# Patient Record
Sex: FEMALE | Race: WHITE | NOT HISPANIC OR LATINO | Employment: OTHER | URBAN - METROPOLITAN AREA
[De-identification: names, ages, dates, MRNs, and addresses within clinical notes are randomized per-mention and may not be internally consistent; named-entity substitution may affect disease eponyms.]

---

## 2017-04-13 ENCOUNTER — APPOINTMENT (OUTPATIENT)
Dept: LAB | Facility: CLINIC | Age: 72
End: 2017-04-13
Payer: MEDICARE

## 2017-04-13 ENCOUNTER — TRANSCRIBE ORDERS (OUTPATIENT)
Dept: URGENT CARE | Facility: CLINIC | Age: 72
End: 2017-04-13

## 2017-04-13 DIAGNOSIS — E11.9 TYPE 2 DIABETES MELLITUS WITHOUT COMPLICATIONS (HCC): ICD-10-CM

## 2017-04-13 LAB
EST. AVERAGE GLUCOSE BLD GHB EST-MCNC: 128 MG/DL
GLUCOSE P FAST SERPL-MCNC: 98 MG/DL (ref 65–99)
HBA1C MFR BLD: 6.1 % (ref 4.2–6.3)

## 2017-04-13 PROCEDURE — 82947 ASSAY GLUCOSE BLOOD QUANT: CPT

## 2017-04-13 PROCEDURE — 83036 HEMOGLOBIN GLYCOSYLATED A1C: CPT

## 2017-04-13 PROCEDURE — 84681 ASSAY OF C-PEPTIDE: CPT

## 2017-04-13 PROCEDURE — 36415 COLL VENOUS BLD VENIPUNCTURE: CPT

## 2017-04-14 LAB — C PEPTIDE SERPL-MCNC: 3.2 NG/ML (ref 1.1–4.4)

## 2017-04-20 ENCOUNTER — ALLSCRIPTS OFFICE VISIT (OUTPATIENT)
Dept: OTHER | Facility: OTHER | Age: 72
End: 2017-04-20

## 2017-06-15 ENCOUNTER — GENERIC CONVERSION - ENCOUNTER (OUTPATIENT)
Dept: OTHER | Facility: OTHER | Age: 72
End: 2017-06-15

## 2017-06-15 LAB
LEFT EYE DIABETIC RETINOPATHY: NORMAL
RIGHT EYE DIABETIC RETINOPATHY: NORMAL

## 2017-08-04 ENCOUNTER — TRANSCRIBE ORDERS (OUTPATIENT)
Dept: URGENT CARE | Facility: CLINIC | Age: 72
End: 2017-08-04

## 2017-08-04 ENCOUNTER — APPOINTMENT (OUTPATIENT)
Dept: LAB | Facility: CLINIC | Age: 72
End: 2017-08-04
Payer: MEDICARE

## 2017-08-04 DIAGNOSIS — E11.9 TYPE 2 DIABETES MELLITUS WITHOUT COMPLICATIONS (HCC): ICD-10-CM

## 2017-08-04 LAB
EST. AVERAGE GLUCOSE BLD GHB EST-MCNC: 123 MG/DL
GLUCOSE P FAST SERPL-MCNC: 101 MG/DL (ref 65–99)
HBA1C MFR BLD: 5.9 % (ref 4.2–6.3)

## 2017-08-04 PROCEDURE — 36415 COLL VENOUS BLD VENIPUNCTURE: CPT

## 2017-08-04 PROCEDURE — 84681 ASSAY OF C-PEPTIDE: CPT

## 2017-08-04 PROCEDURE — 83036 HEMOGLOBIN GLYCOSYLATED A1C: CPT

## 2017-08-04 PROCEDURE — 82947 ASSAY GLUCOSE BLOOD QUANT: CPT

## 2017-08-05 LAB — C PEPTIDE SERPL-MCNC: 4.4 NG/ML (ref 1.1–4.4)

## 2017-08-11 ENCOUNTER — ALLSCRIPTS OFFICE VISIT (OUTPATIENT)
Dept: OTHER | Facility: OTHER | Age: 72
End: 2017-08-11

## 2017-08-14 ENCOUNTER — TRANSCRIBE ORDERS (OUTPATIENT)
Dept: ADMINISTRATIVE | Facility: HOSPITAL | Age: 72
End: 2017-08-14

## 2017-08-14 DIAGNOSIS — M89.9 ABNORMAL MOVEMENT IN BONE: Primary | ICD-10-CM

## 2017-08-20 DIAGNOSIS — E11.9 TYPE 2 DIABETES MELLITUS WITHOUT COMPLICATIONS (HCC): ICD-10-CM

## 2017-09-12 ENCOUNTER — HOSPITAL ENCOUNTER (OUTPATIENT)
Dept: MAMMOGRAPHY | Facility: CLINIC | Age: 72
Discharge: HOME | End: 2017-09-12
Payer: MEDICARE

## 2017-09-12 DIAGNOSIS — M89.9 ABNORMAL MOVEMENT IN BONE: ICD-10-CM

## 2017-09-12 PROCEDURE — 77080 DXA BONE DENSITY AXIAL: CPT

## 2017-10-16 ENCOUNTER — GENERIC CONVERSION - ENCOUNTER (OUTPATIENT)
Dept: OTHER | Facility: OTHER | Age: 72
End: 2017-10-16

## 2017-11-02 ENCOUNTER — APPOINTMENT (OUTPATIENT)
Dept: LAB | Facility: CLINIC | Age: 72
End: 2017-11-02
Payer: MEDICARE

## 2017-11-02 ENCOUNTER — TRANSCRIBE ORDERS (OUTPATIENT)
Dept: URGENT CARE | Facility: CLINIC | Age: 72
End: 2017-11-02

## 2017-11-02 DIAGNOSIS — I10 ESSENTIAL (PRIMARY) HYPERTENSION: ICD-10-CM

## 2017-11-02 DIAGNOSIS — E11.9 TYPE 2 DIABETES MELLITUS WITHOUT COMPLICATIONS (HCC): ICD-10-CM

## 2017-11-02 LAB
ALBUMIN SERPL BCP-MCNC: 3.7 G/DL (ref 3.5–5)
ALP SERPL-CCNC: 61 U/L (ref 46–116)
ALT SERPL W P-5'-P-CCNC: 25 U/L (ref 12–78)
ANION GAP SERPL CALCULATED.3IONS-SCNC: 4 MMOL/L (ref 4–13)
AST SERPL W P-5'-P-CCNC: 12 U/L (ref 5–45)
BASOPHILS # BLD AUTO: 0.03 THOUSANDS/ΜL (ref 0–0.1)
BASOPHILS NFR BLD AUTO: 1 % (ref 0–1)
BILIRUB SERPL-MCNC: 0.78 MG/DL (ref 0.2–1)
BUN SERPL-MCNC: 15 MG/DL (ref 5–25)
CALCIUM SERPL-MCNC: 8.6 MG/DL (ref 8.3–10.1)
CHLORIDE SERPL-SCNC: 106 MMOL/L (ref 100–108)
CHOLEST SERPL-MCNC: 108 MG/DL (ref 50–200)
CO2 SERPL-SCNC: 29 MMOL/L (ref 21–32)
CREAT SERPL-MCNC: 0.71 MG/DL (ref 0.6–1.3)
EOSINOPHIL # BLD AUTO: 0.26 THOUSAND/ΜL (ref 0–0.61)
EOSINOPHIL NFR BLD AUTO: 5 % (ref 0–6)
ERYTHROCYTE [DISTWIDTH] IN BLOOD BY AUTOMATED COUNT: 14.1 % (ref 11.6–15.1)
EST. AVERAGE GLUCOSE BLD GHB EST-MCNC: 111 MG/DL
GFR SERPL CREATININE-BSD FRML MDRD: 85 ML/MIN/1.73SQ M
GLUCOSE P FAST SERPL-MCNC: 120 MG/DL (ref 65–99)
HBA1C MFR BLD: 5.5 % (ref 4.2–6.3)
HCT VFR BLD AUTO: 43.4 % (ref 34.8–46.1)
HDLC SERPL-MCNC: 58 MG/DL (ref 40–60)
HGB BLD-MCNC: 14.3 G/DL (ref 11.5–15.4)
LDLC SERPL CALC-MCNC: 27 MG/DL (ref 0–100)
LYMPHOCYTES # BLD AUTO: 1.07 THOUSANDS/ΜL (ref 0.6–4.47)
LYMPHOCYTES NFR BLD AUTO: 21 % (ref 14–44)
MCH RBC QN AUTO: 30.6 PG (ref 26.8–34.3)
MCHC RBC AUTO-ENTMCNC: 32.9 G/DL (ref 31.4–37.4)
MCV RBC AUTO: 93 FL (ref 82–98)
MONOCYTES # BLD AUTO: 0.68 THOUSAND/ΜL (ref 0.17–1.22)
MONOCYTES NFR BLD AUTO: 13 % (ref 4–12)
NEUTROPHILS # BLD AUTO: 3.14 THOUSANDS/ΜL (ref 1.85–7.62)
NEUTS SEG NFR BLD AUTO: 60 % (ref 43–75)
NRBC BLD AUTO-RTO: 0 /100 WBCS
PLATELET # BLD AUTO: 221 THOUSANDS/UL (ref 149–390)
PMV BLD AUTO: 10 FL (ref 8.9–12.7)
POTASSIUM SERPL-SCNC: 4.2 MMOL/L (ref 3.5–5.3)
PROT SERPL-MCNC: 7.2 G/DL (ref 6.4–8.2)
RBC # BLD AUTO: 4.67 MILLION/UL (ref 3.81–5.12)
SODIUM SERPL-SCNC: 139 MMOL/L (ref 136–145)
TRIGL SERPL-MCNC: 113 MG/DL
TSH SERPL DL<=0.05 MIU/L-ACNC: 2.24 UIU/ML (ref 0.36–3.74)
WBC # BLD AUTO: 5.18 THOUSAND/UL (ref 4.31–10.16)

## 2017-11-02 PROCEDURE — 84443 ASSAY THYROID STIM HORMONE: CPT

## 2017-11-02 PROCEDURE — 80053 COMPREHEN METABOLIC PANEL: CPT

## 2017-11-02 PROCEDURE — 85025 COMPLETE CBC W/AUTO DIFF WBC: CPT

## 2017-11-02 PROCEDURE — 36415 COLL VENOUS BLD VENIPUNCTURE: CPT

## 2017-11-02 PROCEDURE — 83036 HEMOGLOBIN GLYCOSYLATED A1C: CPT

## 2017-11-02 PROCEDURE — 80061 LIPID PANEL: CPT

## 2017-11-02 PROCEDURE — 84681 ASSAY OF C-PEPTIDE: CPT

## 2017-11-03 LAB — C PEPTIDE SERPL-MCNC: 4.9 NG/ML (ref 1.1–4.4)

## 2017-11-09 ENCOUNTER — GENERIC CONVERSION - ENCOUNTER (OUTPATIENT)
Dept: OTHER | Facility: OTHER | Age: 72
End: 2017-11-09

## 2017-11-09 ENCOUNTER — ALLSCRIPTS OFFICE VISIT (OUTPATIENT)
Dept: OTHER | Facility: OTHER | Age: 72
End: 2017-11-09

## 2017-11-09 DIAGNOSIS — E11.9 TYPE 2 DIABETES MELLITUS WITHOUT COMPLICATIONS (HCC): ICD-10-CM

## 2017-11-10 ENCOUNTER — GENERIC CONVERSION - ENCOUNTER (OUTPATIENT)
Dept: OTHER | Facility: OTHER | Age: 72
End: 2017-11-10

## 2017-11-11 DIAGNOSIS — E11.9 TYPE 2 DIABETES MELLITUS WITHOUT COMPLICATIONS (HCC): ICD-10-CM

## 2017-11-11 DIAGNOSIS — I10 ESSENTIAL (PRIMARY) HYPERTENSION: ICD-10-CM

## 2018-01-10 NOTE — PROGRESS NOTES
Assessment    1  Encounter for preventive health examination (V70 0) (Z00 00)    Discussion/Summary  Impression: Subsequent Annual Wellness Visit  Cardiovascular screening and counseling: screening not indicated  Diabetes screening and counseling: screening is current  Colorectal cancer screening and counseling: screening is current  Breast cancer screening and counseling: screening is current  Cervical cancer screening and counseling: screening is current  Osteoporosis screening and counseling: the risks and benefits of screening were discussed, due for DXA axial skeleton and due for DXA appendicular skeleton  Abdominal aortic aneurysm screening and counseling: screening not indicated  Glaucoma screening and counseling: screening is current  Immunizations: influenza vaccine is up to date this year, the lifetime pneumococcal vaccine has been completed, Td vaccination status is unknown and Tdap vaccination status is unknown  Advance Directive Planning: paperwork and instructions were given to the patient  Patient Discussion: plan discussed with the patient, follow-up visit needed in one year  The treatment plan was reviewed with the patient/guardian  The patient/guardian understands and agrees with the treatment plan      History of Present Illness  Welcome to Medicare and Wellness Visits: The patient is being seen for the subsequent annual wellness visit  Medicare Screening and Risk Factors   Hospitalizations: no previous hospitalizations  Medicare Screening Tests Risk Questions   Osteoporosis risk assessment: , female gender, over 48years of age and the patient's weight is too low (<127 lbs)  Drug and Alcohol Use: The patient has never smoked cigarettes  The patient reports rare alcohol use  She has never used illicit drugs     Diet and Physical Activity: Current diet includes well balanced meals, servings of fruit per day, servings of vegetables per day, servings of whole grains per day, servings of dairy products per day and 2 cups of tea per day  She exercises daily  Exercise: walking 30 minutes per day  Mood Disorder and Cognitive Impairment Screening: She reports feeling down, depressed, or hopeless over the past two weeks  She reports feeling little interest or pleasure in doing things over the past two weeks  Functional Ability/Level of Safety: Hearing is significantly decreased, significantly decreased in the right ear and slightly decreased in the left ear  She does not use a hearing aid  The patient is currently able to do activities of daily living without limitations  Activities of daily living details: does not need help using the phone, no transportation help needed, does not need help shopping, no meal preparation help needed, does not need help doing housework, does not need help doing laundry, does not need help managing medications and does not need help managing money  Home safety risk factors:  no unfamiliar surroundings, no loose rugs, no poor household lighting, no uneven floors, no household clutter, grab bars in the bathroom and handrails on the stairs  Advance Directives: Advance directives: living will  Co-Managers and Medical Equipment/Suppliers: See Patient Care Team   Preventive Quality Program 65 and Older: Falls Risk: The patient fell 1 times in the past 12 months  The patient is currently asymptomatic Symptoms Include: The patient currently has no urinary incontinence symptoms  Patient Care Team    Care Team Member Role Specialty Office Number   Carol Guaman, [address] [phone]   Nancy Pineda MD  Internal Medicine [phone]   Jd Irizarry MD  Ophthalmology [phone]     Active Problems     1  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)   2  Acute maxillary sinusitis (461 0) (J01 00)   3  Anxiety (300 00) (F41 9)   4  Colon cancer screening (V76 51) (Z12 11)   5  Depression (311) (F32 9)   6  Encounter for mammogram to establish baseline mammogram (V76 12) (Z12 31)   7  Encounter for preventive health examination (V70 0) (Z00 00)   8  Fatigue (780 79) (R53 83)   9  Major depressive disorder (296 20) (F32 9)   10  Mixed hyperlipidemia (272 2) (E78 2)   11  Need for influenza vaccination (V04 81) (Z23)   12  Pain, hand (729 5) (M79 643)   13  Screening for genitourinary condition (V81 6) (Z13 89)   14  Screening for neurological condition (V80 09) (Z13 89)   15  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    edema (250 50,362 04) (E11 329)    Hyperlipidemia (272 4) (E78 5)       Hypertension (401 9) (I10)       Type 2 diabetes mellitus (250 00) (E11 9)          Past Medical History    · History of Diabetes mellitus type 2, uncomplicated (444 97) (F01 4)   · History of hyperlipidemia (V12 29) (Z86 39)   · No pertinent past medical history    The active problems and past medical history were reviewed and updated today  Surgical History    · History of Cholecystectomy Laparoscopic   · History of Hysterectomy   · History of Inguinal Hernia Repair    The surgical history was reviewed and updated today  Family History  Mother    · Family history of congestive heart failure (V17 49) (Z82 49)  Father    · Family history of diabetes mellitus (V18 0) (Z83 3)    The family history was reviewed and updated today  Social History    ·    · Never a smoker   · No alcohol use   · No drug use   · Retired   · Two children  The social history was reviewed and is unchanged  Current Meds   1  BuPROPion HCl ER (XL) 150 MG Oral Tablet Extended Release 24 Hour; TAKE 1   TABLET DAILY; Therapy: 90JCM9650 to (Evaluate:41Khh8851)  Requested for: 93AAY7925; Last   Rx:32Dnk4249 Ordered   2  Irbesartan 150 MG Oral Tablet; TAKE 1 TABLET DAILY  Requested for: 20Apr2017; Last   Rx:20Apr2017 Ordered   3   MetFORMIN HCl - 500 MG Oral Tablet; TAKE 1 TABLET TWICE DAILY Requested for:   2017; Last Rx:09Klq7600 Ordered   4  Metoprolol Tartrate 25 MG Oral Tablet; TAKE 1 TABLET DAILY  Requested for:   23OZT4945; Last YR:22GDL2786; Status: ACTIVE - Transmit to Pharmacy - Awaiting   Verification Ordered   5  OneTouch Delica Lancets 20D Miscellaneous; TEST 1 TIME DAILY; Therapy: 64Eoi7870 to (Last Rx:83Wyz2794)  Requested for: 48Nup7788 Ordered   6  OneTouch Lancets Miscellaneous; TEST 1 TIME DAILY; Therapy: 32Bgs2167 to (Last Rx:56Svd3959)  Requested for: 02Usv2238 Ordered   7  OneTouch Ultra Blue In Vitro Strip; TEST ONCE DAILY; Therapy: 53Grh7205 to (Evaluate:64Dir9931)  Requested for: 2017; Last   Rx:44Sku2585 Ordered   8  Simvastatin 40 MG Oral Tablet; TAKE 1 TABLET DAILY AS DIRECTED  Requested for:   20Fba3759; Last Rx:48Eag0946 Ordered   9  TraZODone HCl - 100 MG Oral Tablet; TAKE 1 TABLET AT BEDTIME  Requested for:   43Acm7811; Last Rx:44Cei3291 Ordered    The medication list was reviewed and updated today  Allergies    1  No Known Drug Allergies    Immunizations   ** Printed in Appendix #1 below  Vitals  Signs    Temperature: 97 5 F  Heart Rate: 78  Systolic: 163  Diastolic: 72    Physical Exam    Constitutional   General appearance: No acute distress, well appearing and well nourished         Signatures   Electronically signed by : Nick Cannon DO; Aug 11 2017  9:51AM EST                       (Author)    Appendix #1     Patient: Magaly Thompson; : 1945; MRN: [id number]      1 2 3 4 5 6    Influenza  20-Oct-2010 20-Pola-2012 18-Sep-2012 25-Sep-2013 28-Oct-2014 16-Sep-2016    PPSV  23-Aug-2010

## 2018-01-11 NOTE — PROGRESS NOTES
Assessment    1  Encounter for preventive health examination (V70 0) (Z00 00)   2  Major depressive disorder (296 20) (F32 9)    Plan  Major depressive disorder    · Sertraline HCl - 25 MG Oral Tablet (Zoloft); TAKE 1 TABLET DAILY AS DIRECTED    Discussion/Summary  Impression: Initial Annual Wellness Visit, with preventive exam as well as age and risk appropriate counseling completed  Diabetes screening and counseling: screening is current, counseling was given on maintaining a healthy diet, counseling was given on maintaining a healthy weight and counseling was given on ways to improve physical activity  Colorectal cancer screening and counseling: screening is current  Breast cancer screening and counseling: screening is current  Cervical cancer screening and counseling: screening is current  Osteoporosis screening and counseling: the risks and benefits of screening were discussed, counseling was given on obtaining adequate amounts of calcium and vitamin D on a daily basis and counseling was given on the importance of regular weightbearing exercise  Abdominal aortic aneurysm screening and counseling: screening not indicated  Glaucoma screening and counseling: screening is current  Immunizations: the risks and benefits of influenza vaccination were discussed with the patient and the lifetime pneumococcal vaccine has been completed  Advance Directive Planning: paperwork and instructions were given to the patient  Patient Discussion: plan discussed with the patient, follow-up visit needed in one year  History of Present Illness  Welcome to Medicare and Wellness Visits: The patient is being seen for the initial annual wellness visit  Medicare Screening and Risk Factors   Hospitalizations: no previous hospitalizations  Medicare Screening Tests Risk Questions   Osteoporosis risk assessment: , female gender and over 48years of age  HIV risk assessment: none indicated     Drug and Alcohol Use: The patient has never smoked cigarettes  The patient reports rare alcohol use and WINE ONCE OR TWICE A YEAR  Alcohol concern:   The patient has no concerns about alcohol abuse  She has never used illicit drugs  Diet and Physical Activity: Current diet includes limited junk food, 2 servings of fruit per day, 2 servings of vegetables per day, 2 servings of meat per day, 1 servings of whole grains per day, 3 servings of dairy products per day, 0 cups of coffee per day and 2 cups of tea per day  She exercises infrequently  Exercise: walking, stretching  Mood Disorder and Cognitive Impairment Screening: PHQ-9 Depression Scale   Over the past 2 weeks, how often have you been bothered by the following problems? 1 ) Little interest or pleasure in doing things? Several days  2 ) Feeling down, depressed or hopeless? Several days  3 ) Trouble falling asleep or sleeping too much? Half the days or more  4 ) Feeling tired or having little energy? Half the days or more  5 ) Poor appetite or overeating? Several days  6 ) Feeling bad about yourself, or that you are a failure, or have let yourself or your family down? Several days  7 ) Trouble concentrating on things, such as reading a newspaper or watching television? Not at all    8 ) Moving or speaking so slowly that other people could have noticed, or the opposite, moving or speaking faster than usual? Several days  How difficult have these problems made it for you to do your work, take care of things at home, or get along with people? Somewhat difficult  She reports feeling down, depressed, or hopeless over the past two weeks  She denies feeling little interest or pleasure in doing things over the past two weeks     Cognitive impairment screening: denies difficulty learning/retaining new information, denies difficulty handling complex tasks, denies difficulty with reasoning, denies difficulty with spatial ability and orientation, denies difficulty with language and denies difficulty with behavior  Functional Ability/Level of Safety: Hearing is normal bilaterally, normal in the right ear and a hearing aid is not used  The patient is currently able to do activities of daily living without limitations, able to do instrumental activities of daily living without limitations and able to drive without limitations  Activities of daily living details: does not need help using the phone, no transportation help needed, does not need help shopping, no meal preparation help needed, does not need help doing housework, does not need help doing laundry, does not need help managing medications and does not need help managing money  Home safety risk factors:  no unfamiliar surroundings, no loose rugs, no poor household lighting, no uneven floors, no household clutter, grab bars in the bathroom and handrails on the stairs  Advance Directives: Advance directives: no living will, no durable power of  for health care directives and no advance directives  Co-Managers and Medical Equipment/Suppliers: See Patient Care Team      Patient Care Team    Care Team Member Role Specialty Office Number   Ernie, [address] [phone]   Norene Buerger MD  Internal Medicine [phone]   Candance Slot MD  Ophthalmology [phone]     Review of Systems    Constitutional: no fever and no chills  Active Problems    1  Anxiety (300 00) (F41 9)   2  Colon cancer screening (V76 51) (Z12 11)   3  Depression (311) (F32 9)   4  Fatigue (780 79) (R53 83)   5  Hyperlipidemia (272 4) (E78 5)   6  Hypertension (401 9) (I10)   7  Major depressive disorder (296 20) (F32 9)   8  Mixed hyperlipidemia (272 2) (E78 2)   9  Pain, hand (729 5) (M79 643)   10   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    edema (250 50,362 04) (E11 329)    Past Medical History    · History of Diabetes mellitus type 2, uncomplicated (250 00) (E11 9)   · History of hyperlipidemia (V12 29) (Z86 39)   · No pertinent past medical history    The active problems and past medical history were reviewed and updated today  Surgical History    · History of Cholecystectomy Laparoscopic   · History of Hysterectomy   · History of Inguinal Hernia Repair    The surgical history was reviewed and updated today  Family History  Mother    · Family history of congestive heart failure (V17 49) (Z82 49)  Father    · Family history of diabetes mellitus (V18 0) (Z83 3)    The family history was reviewed and updated today  Social History    ·    · Never a smoker   · No alcohol use   · No drug use   · Retired   · Two children  The social history was reviewed and updated today  The social history was reviewed and is unchanged  Current Meds   1  Avapro 150 MG Oral Tablet; TAKE 1 TABLET DAILY; Therapy: (Recorded:15Apr2016) to Recorded   2  Effexor  MG Oral Capsule Extended Release 24 Hour; TAKE 1 CAPSULE DAILY; Therapy: (Recorded:15Apr2016) to Recorded   3  MetFORMIN HCl - 500 MG Oral Tablet; Take 1 tablet twice daily  Requested for:   09FZH9285; Last Rx:64Mwg3613 Ordered   4  Metoprolol Tartrate 25 MG Oral Tablet; TAKE 1 TABLET DAILY; Therapy: (Recorded:15Apr2016) to Recorded   5  Simvastatin 20 MG Oral Tablet; TAKE 1 TABLET AT BEDTIME; Therapy: (Recorded:06May2016) to Recorded   6  TraZODone HCl - 100 MG Oral Tablet; TAKE 1 TABLET AT BEDTIME; Therapy: (Recorded:15Apr2016) to Recorded   7  Zocor 40 MG Oral Tablet; TAKE 1 TABLET AT BEDTIME; Therapy: (Recorded:15Apr2016) to Recorded    The medication list was reviewed and updated today  Allergies    1  No Known Drug Allergies    Immunizations   ** Printed in Appendix #1 below  Physical Exam    Constitutional   General appearance: No acute distress, well appearing and well nourished         Signatures   Electronically signed by : Syed Rowland DO; Aug 16 2016  9:40AM EST                       (Author)    Appendix #1     PatientMillard Dave; : 1945; MRN: [id number]      1 2 3 4 5    Influenza  20-Oct-2010 20-Pola-2012 18-Sep-2012 25-Sep-2013 28-Oct-2014    PPSV  23-Aug-2010

## 2018-01-13 VITALS
HEIGHT: 59 IN | DIASTOLIC BLOOD PRESSURE: 60 MMHG | WEIGHT: 118.6 LBS | BODY MASS INDEX: 23.91 KG/M2 | SYSTOLIC BLOOD PRESSURE: 102 MMHG

## 2018-01-13 VITALS — DIASTOLIC BLOOD PRESSURE: 72 MMHG | TEMPERATURE: 97.5 F | HEART RATE: 78 BPM | SYSTOLIC BLOOD PRESSURE: 128 MMHG

## 2018-01-15 VITALS — WEIGHT: 119 LBS | BODY MASS INDEX: 23.99 KG/M2 | HEIGHT: 59 IN

## 2018-01-22 VITALS
BODY MASS INDEX: 23.99 KG/M2 | WEIGHT: 119 LBS | DIASTOLIC BLOOD PRESSURE: 58 MMHG | TEMPERATURE: 96.2 F | HEIGHT: 59 IN | SYSTOLIC BLOOD PRESSURE: 100 MMHG

## 2018-01-26 ENCOUNTER — OFFICE VISIT (OUTPATIENT)
Dept: FAMILY MEDICINE CLINIC | Facility: CLINIC | Age: 73
End: 2018-01-26
Payer: MEDICARE

## 2018-01-26 VITALS
TEMPERATURE: 98 F | DIASTOLIC BLOOD PRESSURE: 72 MMHG | WEIGHT: 116 LBS | HEART RATE: 65 BPM | HEIGHT: 59 IN | BODY MASS INDEX: 23.39 KG/M2 | SYSTOLIC BLOOD PRESSURE: 125 MMHG | OXYGEN SATURATION: 98 %

## 2018-01-26 DIAGNOSIS — J01.00 ACUTE NON-RECURRENT MAXILLARY SINUSITIS: Primary | ICD-10-CM

## 2018-01-26 PROCEDURE — 99213 OFFICE O/P EST LOW 20 MIN: CPT | Performed by: FAMILY MEDICINE

## 2018-01-26 RX ORDER — ALENDRONATE SODIUM 70 MG/1
1 TABLET ORAL WEEKLY
COMMUNITY
Start: 2017-09-15 | End: 2018-05-01 | Stop reason: SDUPTHER

## 2018-01-26 RX ORDER — AZITHROMYCIN 250 MG/1
TABLET, FILM COATED ORAL
Qty: 6 TABLET | Refills: 0 | Status: SHIPPED | OUTPATIENT
Start: 2018-01-26 | End: 2018-01-30

## 2018-01-26 RX ORDER — IRBESARTAN 150 MG/1
1 TABLET ORAL DAILY
COMMUNITY
End: 2018-01-29 | Stop reason: SDUPTHER

## 2018-01-26 RX ORDER — SIMVASTATIN 40 MG
1 TABLET ORAL DAILY
COMMUNITY
End: 2018-02-01 | Stop reason: SDUPTHER

## 2018-01-26 RX ORDER — TRAZODONE HYDROCHLORIDE 100 MG/1
1 TABLET ORAL
COMMUNITY
End: 2018-02-12 | Stop reason: SDUPTHER

## 2018-01-26 RX ORDER — BUPROPION HYDROCHLORIDE 150 MG/1
1 TABLET ORAL DAILY
COMMUNITY
Start: 2016-09-16 | End: 2018-08-13 | Stop reason: SDUPTHER

## 2018-01-26 NOTE — PROGRESS NOTES
Assessment/Plan:    No problem-specific Assessment & Plan notes found for this encounter  There are no diagnoses linked to this encounter  Subjective:      Patient ID: Rain Su is a 67 y o  female  Pt complains of coughing sinus pain and pressure, chills, it started 1 month ago, pt has not had ill contacts, pt tried OTC medication but it has not helped, pt has some diarrhea, and lack of appetite, but no nausea, or vomiting        The following portions of the patient's history were reviewed and updated as appropriate: allergies, current medications, past family history, past medical history, past social history, past surgical history and problem list     Review of Systems   Constitutional: Positive for chills  Negative for fever  Respiratory: Negative for shortness of breath and wheezing  Objective:     Physical Exam   Constitutional: She is oriented to person, place, and time  She appears well-developed and well-nourished  No distress  HENT:   Head: Normocephalic and atraumatic  Right Ear: Tympanic membrane and ear canal normal    Left Ear: Tympanic membrane and ear canal normal    Nose: Mucosal edema and rhinorrhea present  No nasal deformity  Mouth/Throat: Mucous membranes are not pale, not dry and not cyanotic  No oropharyngeal exudate, posterior oropharyngeal edema, posterior oropharyngeal erythema or tonsillar abscesses  Eyes: No scleral icterus  Neck: Normal range of motion  Neck supple  Cardiovascular: Normal rate, regular rhythm and normal heart sounds  No murmur heard  Pulmonary/Chest: Effort normal and breath sounds normal  No stridor  No respiratory distress  She has no wheezes  She has no rales  She exhibits no tenderness  Lymphadenopathy:     She has no cervical adenopathy  Neurological: She is alert and oriented to person, place, and time  Skin: Skin is warm and dry  She is not diaphoretic  Psychiatric: She has a normal mood and affect   Her behavior is normal  Judgment and thought content normal    Nursing note and vitals reviewed

## 2018-01-29 DIAGNOSIS — I10 ESSENTIAL HYPERTENSION: Primary | ICD-10-CM

## 2018-01-29 RX ORDER — IRBESARTAN 150 MG/1
TABLET ORAL
Qty: 90 TABLET | Refills: 3 | Status: SHIPPED | OUTPATIENT
Start: 2018-01-29 | End: 2018-05-14 | Stop reason: SDUPTHER

## 2018-02-01 DIAGNOSIS — E78.00 HYPERCHOLESTEROLEMIA: Primary | ICD-10-CM

## 2018-02-01 RX ORDER — SIMVASTATIN 40 MG
40 TABLET ORAL DAILY
Qty: 30 TABLET | Refills: 5 | Status: SHIPPED | OUTPATIENT
Start: 2018-02-01 | End: 2018-03-22 | Stop reason: SDUPTHER

## 2018-02-05 DIAGNOSIS — I10 ESSENTIAL HYPERTENSION: ICD-10-CM

## 2018-02-05 DIAGNOSIS — I10 HYPERTENSION, UNSPECIFIED TYPE: Primary | ICD-10-CM

## 2018-02-12 DIAGNOSIS — R52 PAIN: ICD-10-CM

## 2018-02-12 DIAGNOSIS — M79.643 PAIN OF HAND, UNSPECIFIED LATERALITY: Primary | ICD-10-CM

## 2018-02-13 RX ORDER — TRAZODONE HYDROCHLORIDE 100 MG/1
100 TABLET ORAL
Qty: 90 TABLET | Refills: 0 | Status: SHIPPED | OUTPATIENT
Start: 2018-02-13 | End: 2018-05-07 | Stop reason: SDUPTHER

## 2018-02-22 ENCOUNTER — TRANSCRIBE ORDERS (OUTPATIENT)
Dept: URGENT CARE | Facility: CLINIC | Age: 73
End: 2018-02-22

## 2018-02-22 ENCOUNTER — APPOINTMENT (OUTPATIENT)
Dept: LAB | Facility: CLINIC | Age: 73
End: 2018-02-22
Payer: MEDICARE

## 2018-02-22 DIAGNOSIS — E11.9 TYPE 2 DIABETES MELLITUS WITHOUT COMPLICATIONS (HCC): ICD-10-CM

## 2018-02-22 LAB
EST. AVERAGE GLUCOSE BLD GHB EST-MCNC: 111 MG/DL
GLUCOSE P FAST SERPL-MCNC: 105 MG/DL (ref 65–99)
HBA1C MFR BLD: 5.5 % (ref 4.2–6.3)

## 2018-02-22 PROCEDURE — 83036 HEMOGLOBIN GLYCOSYLATED A1C: CPT

## 2018-02-22 PROCEDURE — 84681 ASSAY OF C-PEPTIDE: CPT

## 2018-02-22 PROCEDURE — 82947 ASSAY GLUCOSE BLOOD QUANT: CPT

## 2018-02-22 PROCEDURE — 36415 COLL VENOUS BLD VENIPUNCTURE: CPT

## 2018-02-23 LAB — C PEPTIDE SERPL-MCNC: 4.3 NG/ML (ref 1.1–4.4)

## 2018-03-01 ENCOUNTER — OFFICE VISIT (OUTPATIENT)
Dept: FAMILY MEDICINE CLINIC | Facility: CLINIC | Age: 73
End: 2018-03-01
Payer: MEDICARE

## 2018-03-01 VITALS
BODY MASS INDEX: 23.39 KG/M2 | TEMPERATURE: 99.8 F | HEIGHT: 59 IN | WEIGHT: 116 LBS | HEART RATE: 88 BPM | DIASTOLIC BLOOD PRESSURE: 62 MMHG | SYSTOLIC BLOOD PRESSURE: 100 MMHG | OXYGEN SATURATION: 99 %

## 2018-03-01 DIAGNOSIS — E11.9 TYPE 2 DIABETES MELLITUS WITHOUT COMPLICATION, WITHOUT LONG-TERM CURRENT USE OF INSULIN (HCC): Primary | ICD-10-CM

## 2018-03-01 DIAGNOSIS — J01.00 ACUTE NON-RECURRENT MAXILLARY SINUSITIS: ICD-10-CM

## 2018-03-01 DIAGNOSIS — I10 ESSENTIAL HYPERTENSION: ICD-10-CM

## 2018-03-01 PROCEDURE — 99214 OFFICE O/P EST MOD 30 MIN: CPT | Performed by: FAMILY MEDICINE

## 2018-03-01 RX ORDER — AMOXICILLIN AND CLAVULANATE POTASSIUM 875; 125 MG/1; MG/1
1 TABLET, FILM COATED ORAL EVERY 12 HOURS SCHEDULED
Qty: 20 TABLET | Refills: 0 | Status: SHIPPED | OUTPATIENT
Start: 2018-03-01 | End: 2018-03-11

## 2018-03-01 NOTE — PROGRESS NOTES
Assessment/Plan:    No problem-specific Assessment & Plan notes found for this encounter  Diagnoses and all orders for this visit:    Type 2 diabetes mellitus without complication, without long-term current use of insulin ([facility])  Comments:  well controlled no change in the medication  Orders:  -     C-peptide; Future  -     Glucose, fasting; Future  -     HEMOGLOBIN A1C W/ EAG ESTIMATION; Future    Essential hypertension  Comments:  well controlled no change in the medications    Acute non-recurrent maxillary sinusitis  Comments:  pt counseled to eat yogurt with abx  Orders:  -     amoxicillin-clavulanate (AUGMENTIN) 875-125 mg per tablet; Take 1 tablet by mouth every 12 (twelve) hours for 10 days          Subjective:      Patient ID: Emily Galarza is a 67 y o  female  Follow up for HTN, which is well controlled, pt complains today of sinus pain and pressure, stuffy nose, runny nose, coughing non-productive, it started 3 days ago, pt has not tried anything for it, pt has had some ill contacts      Diabetes   She presents for her follow-up diabetic visit  She has type 2 diabetes mellitus  There are no hypoglycemic associated symptoms  There are no diabetic associated symptoms  Pertinent negatives for diabetes include no chest pain  There are no hypoglycemic complications  There are no diabetic complications  Risk factors for coronary artery disease include obesity, sedentary lifestyle and post-menopausal  She is compliant with treatment most of the time  Her weight is stable  She is following a diabetic diet  Her overall blood glucose range is  mg/dl  An ACE inhibitor/angiotensin II receptor blocker is being taken  Eye exam is current         The following portions of the patient's history were reviewed and updated as appropriate: allergies, current medications, past family history, past medical history, past social history, past surgical history and problem list     Review of Systems Constitutional: Positive for chills  Negative for fever  Eyes: Negative for visual disturbance  Cardiovascular: Negative for chest pain and palpitations  Gastrointestinal: Negative for abdominal pain, nausea and vomiting  Genitourinary: Negative for frequency  Skin: Negative for wound  Neurological: Negative for numbness  Objective:      /62 (BP Location: Right arm, Patient Position: Sitting, Cuff Size: Adult)   Pulse 88   Temp 99 8 °F (37 7 °C) (Tympanic)   Ht 4' 10 5" (1 486 m)   Wt 52 6 kg (116 lb)   SpO2 99%   BMI 23 83 kg/m²          Physical Exam   Constitutional: She is oriented to person, place, and time  She appears well-developed and well-nourished  No distress  HENT:   Head: Normocephalic and atraumatic  Right Ear: Tympanic membrane, external ear and ear canal normal    Left Ear: Tympanic membrane, external ear and ear canal normal    Nose: Mucosal edema and rhinorrhea present  [place] OP   Eyes: Conjunctivae and EOM are normal  Pupils are equal, round, and reactive to light  No scleral icterus  Neck: Normal range of motion  Neck supple  Cardiovascular: Normal rate, regular rhythm and normal heart sounds  No murmur heard  Pulmonary/Chest: Effort normal and breath sounds normal  No respiratory distress  She has no wheezes  She has no rales  Musculoskeletal: Normal range of motion  She exhibits no edema or deformity  Lymphadenopathy:     She has no cervical adenopathy  Neurological: She is alert and oriented to person, place, and time  Skin: Skin is warm and dry  No rash noted  She is not diaphoretic  No erythema  No pallor  Psychiatric: She has a normal mood and affect  Her behavior is normal  Judgment and thought content normal    Nursing note and vitals reviewed

## 2018-03-22 DIAGNOSIS — E78.00 HYPERCHOLESTEROLEMIA: ICD-10-CM

## 2018-03-22 RX ORDER — SIMVASTATIN 40 MG
40 TABLET ORAL DAILY
Qty: 90 TABLET | Refills: 1 | Status: SHIPPED | OUTPATIENT
Start: 2018-03-22 | End: 2018-09-29 | Stop reason: SDUPTHER

## 2018-05-01 DIAGNOSIS — M81.0 OSTEOPOROSIS, UNSPECIFIED OSTEOPOROSIS TYPE, UNSPECIFIED PATHOLOGICAL FRACTURE PRESENCE: Primary | ICD-10-CM

## 2018-05-01 RX ORDER — ALENDRONATE SODIUM 70 MG/1
70 TABLET ORAL
Qty: 4 TABLET | Refills: 5 | Status: SHIPPED | OUTPATIENT
Start: 2018-05-01 | End: 2018-11-28 | Stop reason: SDUPTHER

## 2018-05-07 DIAGNOSIS — E11.9 TYPE 2 DIABETES MELLITUS WITHOUT COMPLICATION, WITHOUT LONG-TERM CURRENT USE OF INSULIN (HCC): Primary | ICD-10-CM

## 2018-05-07 DIAGNOSIS — M79.643 PAIN OF HAND, UNSPECIFIED LATERALITY: ICD-10-CM

## 2018-05-07 RX ORDER — TRAZODONE HYDROCHLORIDE 100 MG/1
100 TABLET ORAL
Qty: 90 TABLET | Refills: 1 | Status: SHIPPED | OUTPATIENT
Start: 2018-05-07 | End: 2018-11-03 | Stop reason: SDUPTHER

## 2018-05-10 ENCOUNTER — CONSULT (OUTPATIENT)
Dept: FAMILY MEDICINE CLINIC | Facility: CLINIC | Age: 73
End: 2018-05-10
Payer: MEDICARE

## 2018-05-10 VITALS
RESPIRATION RATE: 96 BRPM | WEIGHT: 116 LBS | BODY MASS INDEX: 24.35 KG/M2 | SYSTOLIC BLOOD PRESSURE: 138 MMHG | TEMPERATURE: 98.1 F | DIASTOLIC BLOOD PRESSURE: 70 MMHG | HEIGHT: 58 IN | HEART RATE: 89 BPM

## 2018-05-10 DIAGNOSIS — I10 ESSENTIAL HYPERTENSION: Primary | ICD-10-CM

## 2018-05-10 DIAGNOSIS — H25.9 AGE-RELATED CATARACT OF BOTH EYES, UNSPECIFIED AGE-RELATED CATARACT TYPE: ICD-10-CM

## 2018-05-10 PROCEDURE — 99213 OFFICE O/P EST LOW 20 MIN: CPT | Performed by: FAMILY MEDICINE

## 2018-05-10 RX ORDER — PREDNISOLONE ACETATE 10 MG/ML
SUSPENSION/ DROPS OPHTHALMIC
COMMUNITY
Start: 2018-04-30 | End: 2020-08-26 | Stop reason: ALTCHOICE

## 2018-05-10 RX ORDER — OFLOXACIN 3 MG/ML
SOLUTION/ DROPS OPHTHALMIC
COMMUNITY
Start: 2018-05-06 | End: 2020-08-26 | Stop reason: ALTCHOICE

## 2018-05-10 NOTE — PROGRESS NOTES
Assessment/Plan:    No problem-specific Assessment & Plan notes found for this encounter  Diagnoses and all orders for this visit:    Essential hypertension  Comments:  well controlled on current medication    Age-related cataract of both eyes, unspecified age-related cataract type  Comments:  pt is a low cardiovascular risk for proposed proceedure by ACC/AHA guidlines    Other orders  -     prednisoLONE acetate (PRED FORTE) 1 % ophthalmic suspension;   -     ofloxacin (OCUFLOX) 0 3 % ophthalmic solution;           Subjective:      Patient ID: Rachele Tellez is a 68 y o  female  Pt here about bilateral cataracts pt is having surgery starting with the left      Hypertension   This is a chronic problem  The current episode started more than 1 year ago  The problem is controlled  Pertinent negatives include no chest pain, palpitations or shortness of breath  Past treatments include angiotensin blockers  There are no compliance problems  The following portions of the patient's history were reviewed and updated as appropriate: allergies, current medications, past family history, past medical history, past social history, past surgical history and problem list     Review of Systems   Constitutional: Negative for chills, fatigue and fever  HENT: Negative  Eyes: Negative  Respiratory: Negative for shortness of breath and wheezing  Cardiovascular: Negative for chest pain and palpitations  Gastrointestinal: Negative for abdominal pain, blood in stool, constipation, diarrhea, nausea and vomiting  Endocrine: Negative  Genitourinary: Negative for dysuria  Musculoskeletal: Negative for arthralgias and myalgias  Skin: Negative  Allergic/Immunologic: Negative  Neurological: Negative for seizures and syncope  Hematological: Negative for adenopathy  Psychiatric/Behavioral: Negative            Objective:      /70   Pulse 89   Temp 98 1 °F (36 7 °C)   Resp (!) 96   Ht 4' 10 25" (1 48 m)   Wt 52 6 kg (116 lb)   BMI 24 04 kg/m²          Physical Exam   Constitutional: She is oriented to person, place, and time  She appears well-developed and well-nourished  No distress  HENT:   Head: Normocephalic and atraumatic  Right Ear: External ear normal    Left Ear: External ear normal    Nose: Nose normal    Mouth/Throat: Oropharynx is clear and moist    Eyes: Conjunctivae and EOM are normal  Pupils are equal, round, and reactive to light  No scleral icterus  Neck: Normal range of motion  Neck supple  Cardiovascular: Normal rate, regular rhythm and normal heart sounds  No murmur heard  Pulmonary/Chest: Effort normal and breath sounds normal  No respiratory distress  She has no wheezes  She has no rales  Abdominal: Soft  Bowel sounds are normal  She exhibits no distension and no mass  There is no tenderness  There is no rebound and no guarding  Musculoskeletal: Normal range of motion  She exhibits no edema  Lymphadenopathy:     She has no cervical adenopathy  Neurological: She is alert and oriented to person, place, and time  She has normal reflexes  She exhibits normal muscle tone  Skin: Skin is warm and dry  No rash noted  She is not diaphoretic  No erythema  No pallor  Psychiatric: She has a normal mood and affect  Her behavior is normal  Judgment and thought content normal    Nursing note and vitals reviewed

## 2018-05-14 DIAGNOSIS — I10 ESSENTIAL HYPERTENSION: ICD-10-CM

## 2018-05-15 RX ORDER — IRBESARTAN 150 MG/1
150 TABLET ORAL DAILY
Qty: 90 TABLET | Refills: 3 | Status: SHIPPED | OUTPATIENT
Start: 2018-05-15 | End: 2019-03-11

## 2018-05-25 ENCOUNTER — APPOINTMENT (OUTPATIENT)
Dept: LAB | Facility: CLINIC | Age: 73
End: 2018-05-25
Payer: MEDICARE

## 2018-05-25 DIAGNOSIS — E11.9 TYPE 2 DIABETES MELLITUS WITHOUT COMPLICATION, WITHOUT LONG-TERM CURRENT USE OF INSULIN (HCC): ICD-10-CM

## 2018-05-25 LAB
EST. AVERAGE GLUCOSE BLD GHB EST-MCNC: 111 MG/DL
GLUCOSE P FAST SERPL-MCNC: 108 MG/DL (ref 65–99)
HBA1C MFR BLD: 5.5 % (ref 4.2–6.3)

## 2018-05-25 PROCEDURE — 36415 COLL VENOUS BLD VENIPUNCTURE: CPT

## 2018-05-25 PROCEDURE — 83036 HEMOGLOBIN GLYCOSYLATED A1C: CPT

## 2018-05-25 PROCEDURE — 82947 ASSAY GLUCOSE BLOOD QUANT: CPT

## 2018-05-25 PROCEDURE — 84681 ASSAY OF C-PEPTIDE: CPT

## 2018-05-26 LAB — C PEPTIDE SERPL-MCNC: 4.5 NG/ML (ref 1.1–4.4)

## 2018-06-01 ENCOUNTER — OFFICE VISIT (OUTPATIENT)
Dept: FAMILY MEDICINE CLINIC | Facility: CLINIC | Age: 73
End: 2018-06-01
Payer: MEDICARE

## 2018-06-01 VITALS
OXYGEN SATURATION: 100 % | HEIGHT: 59 IN | BODY MASS INDEX: 22.9 KG/M2 | WEIGHT: 113.6 LBS | TEMPERATURE: 98.3 F | SYSTOLIC BLOOD PRESSURE: 111 MMHG | DIASTOLIC BLOOD PRESSURE: 66 MMHG | HEART RATE: 66 BPM

## 2018-06-01 DIAGNOSIS — E11.9 TYPE 2 DIABETES MELLITUS WITHOUT COMPLICATION, WITHOUT LONG-TERM CURRENT USE OF INSULIN (HCC): Primary | ICD-10-CM

## 2018-06-01 DIAGNOSIS — I10 ESSENTIAL HYPERTENSION: ICD-10-CM

## 2018-06-01 DIAGNOSIS — E11.9 ENCOUNTER FOR DIABETIC FOOT EXAM (HCC): ICD-10-CM

## 2018-06-01 DIAGNOSIS — E78.00 HYPERCHOLESTEROLEMIA: ICD-10-CM

## 2018-06-01 PROCEDURE — 99214 OFFICE O/P EST MOD 30 MIN: CPT | Performed by: FAMILY MEDICINE

## 2018-08-13 DIAGNOSIS — F32.A DEPRESSION, UNSPECIFIED DEPRESSION TYPE: Primary | ICD-10-CM

## 2018-08-13 RX ORDER — BUPROPION HYDROCHLORIDE 150 MG/1
150 TABLET ORAL DAILY
Qty: 30 TABLET | Refills: 5 | Status: SHIPPED | OUTPATIENT
Start: 2018-08-13 | End: 2019-02-16 | Stop reason: SDUPTHER

## 2018-09-04 ENCOUNTER — TRANSCRIBE ORDERS (OUTPATIENT)
Dept: LAB | Facility: CLINIC | Age: 73
End: 2018-09-04

## 2018-09-04 ENCOUNTER — APPOINTMENT (OUTPATIENT)
Dept: LAB | Facility: CLINIC | Age: 73
End: 2018-09-04
Payer: MEDICARE

## 2018-09-04 DIAGNOSIS — E11.9 TYPE 2 DIABETES MELLITUS WITHOUT COMPLICATION, WITHOUT LONG-TERM CURRENT USE OF INSULIN (HCC): ICD-10-CM

## 2018-09-04 LAB
EST. AVERAGE GLUCOSE BLD GHB EST-MCNC: 117 MG/DL
GLUCOSE P FAST SERPL-MCNC: 102 MG/DL (ref 65–99)
HBA1C MFR BLD: 5.7 % (ref 4.2–6.3)

## 2018-09-04 PROCEDURE — 83036 HEMOGLOBIN GLYCOSYLATED A1C: CPT

## 2018-09-04 PROCEDURE — 82947 ASSAY GLUCOSE BLOOD QUANT: CPT

## 2018-09-04 PROCEDURE — 36415 COLL VENOUS BLD VENIPUNCTURE: CPT

## 2018-09-04 PROCEDURE — 84681 ASSAY OF C-PEPTIDE: CPT

## 2018-09-05 LAB — C PEPTIDE SERPL-MCNC: 5 NG/ML (ref 1.1–4.4)

## 2018-09-07 ENCOUNTER — OFFICE VISIT (OUTPATIENT)
Dept: FAMILY MEDICINE CLINIC | Facility: CLINIC | Age: 73
End: 2018-09-07
Payer: MEDICARE

## 2018-09-07 VITALS
WEIGHT: 121 LBS | BODY MASS INDEX: 24.39 KG/M2 | SYSTOLIC BLOOD PRESSURE: 106 MMHG | TEMPERATURE: 97.7 F | DIASTOLIC BLOOD PRESSURE: 62 MMHG | HEIGHT: 59 IN

## 2018-09-07 DIAGNOSIS — I10 ESSENTIAL HYPERTENSION: ICD-10-CM

## 2018-09-07 DIAGNOSIS — E11.9 TYPE 2 DIABETES MELLITUS WITHOUT COMPLICATION, WITHOUT LONG-TERM CURRENT USE OF INSULIN (HCC): Primary | ICD-10-CM

## 2018-09-07 DIAGNOSIS — Z23 NEED FOR INFLUENZA VACCINATION: Primary | ICD-10-CM

## 2018-09-07 DIAGNOSIS — F32.A DEPRESSION, UNSPECIFIED DEPRESSION TYPE: ICD-10-CM

## 2018-09-07 PROCEDURE — 90662 IIV NO PRSV INCREASED AG IM: CPT

## 2018-09-07 PROCEDURE — G0008 ADMIN INFLUENZA VIRUS VAC: HCPCS

## 2018-09-07 PROCEDURE — 99214 OFFICE O/P EST MOD 30 MIN: CPT | Performed by: FAMILY MEDICINE

## 2018-09-07 NOTE — PROGRESS NOTES
Assessment/Plan:    No problem-specific Assessment & Plan notes found for this encounter  Diagnoses and all orders for this visit:    Type 2 diabetes mellitus without complication, without long-term current use of insulin (HCC)  -     C-peptide; Future  -     Glucose, fasting; Future  -     Hemoglobin A1C; Future    Essential hypertension  -     CBC and differential; Future  -     Comprehensive metabolic panel; Future  -     Lipid panel; Future    Depression, unspecified depression type  Comments:  pt is doing well  Orders:  -     TSH, 3rd generation with Free T4 reflex; Future          Subjective:      Patient ID: Darius Barraza is a 68 y o  female  Follow up for HTN, pt is taking avapro and metoprolol and is doing well, pt is taking metformin only once daily, follow up for depression pt is doing well on wellbutrin      Diabetes   She presents for her follow-up diabetic visit  She has type 2 diabetes mellitus  Her disease course has been stable  There are no hypoglycemic associated symptoms  There are no diabetic associated symptoms  Pertinent negatives for diabetes include no chest pain  There are no hypoglycemic complications  Symptoms are stable  Risk factors for coronary artery disease include post-menopausal and sedentary lifestyle  Current diabetic treatment includes oral agent (monotherapy)  She is compliant with treatment most of the time  Her weight is stable  Her overall blood glucose range is 110-130 mg/dl  An ACE inhibitor/angiotensin II receptor blocker is being taken  The following portions of the patient's history were reviewed and updated as appropriate: allergies, current medications, past family history, past medical history, past social history, past surgical history and problem list     Review of Systems   Eyes: Negative for visual disturbance  Cardiovascular: Negative for chest pain and palpitations  Gastrointestinal: Negative for abdominal pain, nausea and vomiting  Genitourinary: Negative for frequency  Skin: Negative for wound  Neurological: Negative for numbness  Objective:      /62 (BP Location: Left arm, Patient Position: Sitting, Cuff Size: Adult)   Temp 97 7 °F (36 5 °C) (Tympanic)   Ht 4' 10 5" (1 486 m)   Wt 54 9 kg (121 lb)   BMI 24 86 kg/m²          Physical Exam   Constitutional: She is oriented to person, place, and time  She appears well-developed and well-nourished  No distress  HENT:   Head: Normocephalic and atraumatic  Eyes: Conjunctivae and EOM are normal  Pupils are equal, round, and reactive to light  No scleral icterus  Neck: Normal range of motion  Neck supple  Cardiovascular: Normal rate, regular rhythm and normal heart sounds  No murmur heard  Pulmonary/Chest: Effort normal and breath sounds normal  No respiratory distress  She has no wheezes  She has no rales  Abdominal: Soft  Bowel sounds are normal  She exhibits no distension and no mass  There is no tenderness  There is no rebound and no guarding  Musculoskeletal: Normal range of motion  She exhibits no edema or deformity  Lymphadenopathy:     She has no cervical adenopathy  Neurological: She is alert and oriented to person, place, and time  She exhibits normal muscle tone  Skin: Skin is warm and dry  No rash noted  She is not diaphoretic  No erythema  No pallor  Psychiatric: She has a normal mood and affect  Her behavior is normal  Judgment and thought content normal    Nursing note and vitals reviewed

## 2018-09-29 DIAGNOSIS — E78.00 HYPERCHOLESTEROLEMIA: ICD-10-CM

## 2018-10-01 RX ORDER — SIMVASTATIN 40 MG
TABLET ORAL
Qty: 90 TABLET | Refills: 1 | Status: SHIPPED | OUTPATIENT
Start: 2018-10-01 | End: 2019-03-30 | Stop reason: SDUPTHER

## 2018-10-02 ENCOUNTER — TRANSCRIBE ORDERS (OUTPATIENT)
Dept: ADMINISTRATIVE | Facility: HOSPITAL | Age: 73
End: 2018-10-02

## 2018-10-02 DIAGNOSIS — Z12.31 VISIT FOR SCREENING MAMMOGRAM: Primary | ICD-10-CM

## 2018-10-04 ENCOUNTER — HOSPITAL ENCOUNTER (OUTPATIENT)
Dept: MAMMOGRAPHY | Facility: HOSPITAL | Age: 73
Discharge: HOME | End: 2018-10-04
Attending: SPECIALIST
Payer: MEDICARE

## 2018-10-04 DIAGNOSIS — Z12.31 VISIT FOR SCREENING MAMMOGRAM: ICD-10-CM

## 2018-10-04 PROCEDURE — 77067 SCR MAMMO BI INCL CAD: CPT

## 2018-10-04 PROCEDURE — 77063 BREAST TOMOSYNTHESIS BI: CPT

## 2018-10-10 ENCOUNTER — TRANSCRIBE ORDERS (OUTPATIENT)
Dept: ADMINISTRATIVE | Facility: HOSPITAL | Age: 73
End: 2018-10-10

## 2018-10-10 DIAGNOSIS — R92.2 BREAST DENSITY: Primary | ICD-10-CM

## 2018-10-23 ENCOUNTER — HOSPITAL ENCOUNTER (OUTPATIENT)
Dept: MAMMOGRAPHY | Facility: HOSPITAL | Age: 73
Discharge: HOME | End: 2018-10-23
Attending: SPECIALIST
Payer: MEDICARE

## 2018-10-23 ENCOUNTER — HOSPITAL ENCOUNTER (OUTPATIENT)
Dept: ULTRASOUND IMAGING | Facility: HOSPITAL | Age: 73
Discharge: HOME | End: 2018-10-23
Attending: SPECIALIST
Payer: MEDICARE

## 2018-10-23 DIAGNOSIS — R92.2 BREAST DENSITY: ICD-10-CM

## 2018-10-23 PROCEDURE — 76642 ULTRASOUND BREAST LIMITED: CPT

## 2018-10-23 PROCEDURE — G0279 TOMOSYNTHESIS, MAMMO: HCPCS

## 2018-10-23 PROCEDURE — 77065 DX MAMMO INCL CAD UNI: CPT

## 2018-10-31 ENCOUNTER — OFFICE VISIT (OUTPATIENT)
Dept: SURGERY | Facility: CLINIC | Age: 73
End: 2018-10-31
Payer: MEDICARE

## 2018-10-31 VITALS
HEIGHT: 59 IN | BODY MASS INDEX: 25.2 KG/M2 | WEIGHT: 125 LBS | RESPIRATION RATE: 16 BRPM | DIASTOLIC BLOOD PRESSURE: 68 MMHG | SYSTOLIC BLOOD PRESSURE: 112 MMHG | HEART RATE: 66 BPM

## 2018-10-31 DIAGNOSIS — R92.8 ABNORMAL FINDINGS ON DIAGNOSTIC IMAGING OF BREAST: Primary | ICD-10-CM

## 2018-10-31 PROCEDURE — 99204 OFFICE O/P NEW MOD 45 MIN: CPT | Performed by: SURGERY

## 2018-10-31 NOTE — ASSESSMENT & PLAN NOTE
The patient has an abnormal finding on diagnostic imaging of her left breast  No suspicious findings by breast exam or history  To make a definitive diagnosis and exclude malignancy, further testing was recommended with US guided left breast biopsy  Details of the procedure and risks related to anesthesia, bleeding, infection, and hematoma were reviewed  Questions answered, informed consent to be obtained by Dr Laura Skelton  Follow up arranged

## 2018-10-31 NOTE — PATIENT INSTRUCTIONS
Core Needle Breast Biopsy   AMBULATORY CARE:   What you need to know about a core needle breast biopsy (CNBB):  A CNBB is a procedure to remove a sample of fluid or tissue from your breast  The samples are sent to a lab and tested for cancer or infection  The needle used in a core needle biopsy is larger than the one used in a fine needle aspiration  This lets your healthcare provider take a larger sample of tissue  How to prepare for a CNBB:   · Your healthcare provider will talk to you about how to prepare for your procedure  You may need to stop taking blood thinner medicine or aspirin 3 days before your procedure  The provider may tell you not to eat or drink anything 8 hours before your procedure  The provider will tell you what medicines to take or not take on the day of your procedure  · You may be given contrast liquid during your procedure to help the tissue show up better in the pictures  Tell the healthcare provider if you have ever had an allergic reaction to contrast liquid  An MRI may also be used during your procedure  Do not enter the procedure room with anything metal  Metal can cause serious injury  Tell the healthcare provider if you have any metal in or on your body  Also tell him if you are pregnant or think you are pregnant  Special shields can be used during the procedure to keep your baby safe  · Do not put on lotions or powders on the day of your procedure  Do not put on deodorant if your biopsy will be taken near your armpit  These products may cause particles to appear on your x-ray  Wear loose-fitting clothing to your procedure  Arrange for someone to drive you home and stay with you after your procedure  What will happen during a CNBB:   · You may be given IV sedation to help you relax during your procedure  You may also be given local anesthesia to numb the surgery area   With local anesthesia, you may still feel pressure or pushing during your procedure, but you should not feel any pain  You may be given contrast liquid through your IV to help your breast tissue show up better in pictures  · Your healthcare provider may make a small incision in your breast  He or she will insert a needle through your skin and into the tissue or fluid  The provider may use x-ray, ultrasound, or MRI pictures to help guide the needle to the correct place  When the needle reaches the fluid or tissue, multiple samples will be taken  Your healthcare provider may use a small vacuum to help remove the fluid or tissue  · After your healthcare provider takes samples, he or she may yu the area with a small wire or metal clip  The provider may also inject the area with liquid dye  This will help other healthcare providers find the abnormal tissue at a later time  The provider will remove the needle and apply pressure to the area  You will not need stitches  A small bandage will be placed over your biopsy site  Your healthcare provider may also wrap a tight-fitting bandage across both breasts  This may prevent bleeding, swelling, and pain at the biopsy site  What will happen after a CNBB:  Healthcare providers will monitor you until you are awake  You may need a mammogram if a marker was inserted during your procedure  This will make sure that it is in the correct place  You may be sore or have bruising or swelling in your breast for a few days  Do not breastfeed for 24 to 48 hours if you received contrast liquid  The contrast liquid may harm your baby  You may go home after your procedure or you may need to spend a night in the hospital    Risks of a CNBB:  You may bleed more than expected or get an infection  A pocket of blood or fluid may form under your skin  You may need surgery to drain or remove it  The biopsy needle may make a hole in your lung and cause trouble breathing  You may need other treatments to fix the hole     Call 911 for any of the following:   · You feel lightheaded, short of breath, and have chest pain  · You cough up blood  · You have trouble breathing  Seek care immediately if:   · Blood soaks through your bandage  · Your bruise suddenly gets larger and feels hard  · You have severe pain in your breast   Contact your healthcare provider if:   · You have a fever or chills  · Your biopsy site is red, swollen, or draining pus  · You have nausea or are vomiting  · Your skin is itchy, swollen, or you have a rash  · Your pain does not get better after you take pain medicine  · You have questions or concerns about your condition or care  Medicines: You may need any of the following:  · NSAIDs , such as ibuprofen, help decrease swelling, pain, and fever  This medicine is available with or without a doctor's order  NSAIDs can cause stomach bleeding or kidney problems in certain people  If you take blood thinner medicine, always ask your healthcare provider if NSAIDs are safe for you  Always read the medicine label and follow directions  · Acetaminophen  decreases pain and fever  It is available without a doctor's order  Ask how much to take and how often to take it  Follow directions  Read the labels of all other medicines you are using to see if they also contain acetaminophen, or ask your doctor or pharmacist  Acetaminophen can cause liver damage if not taken correctly  Do not use more than 4 grams (4,000 milligrams) total of acetaminophen in one day  · Prescription pain medicine  may be given  Ask your healthcare provider how to take this medicine safely  Some prescription pain medicines contain acetaminophen  Do not take other medicines that contain acetaminophen without talking to your healthcare provider  Too much acetaminophen may cause liver damage  Prescription pain medicine may cause constipation  Ask your healthcare provider how to prevent or treat constipation  · Take your medicine as directed    Contact your healthcare provider if you think your medicine is not helping or if you have side effects  Tell him or her if you are allergic to any medicine  Keep a list of the medicines, vitamins, and herbs you take  Include the amounts, and when and why you take them  Bring the list or the pill bottles to follow-up visits  Carry your medicine list with you in case of an emergency  Care for your biopsy site as directed: If you have a tight-fitting bandage, you can remove it in 24 to 48 hours, or as directed  Ask your healthcare provider when your biopsy site can get wet  Carefully wash around the site with soap and water  It is okay to let soap and water gently run over your biopsy site  Do not  scrub the site  Dry the area and put on new, clean bandages as directed  Change your bandages when they get wet or dirty  Check your biopsy site every day for signs of infection such as redness, swelling, or pus  Do not put powders or lotions on your biopsy site  Self-care:   · Apply ice  on your biopsy site for 15 to 20 minutes every hour or as directed  Use an ice pack, or put crushed ice in a plastic bag  Cover it with a towel before you apply it to your skin  Ice helps prevent tissue damage and decreases swelling and pain  · Rest  as directed  Do not lift, push or pull with your arms, play sports, or exercise for 24 hours or as directed  These activities may cause bleeding  Short walks around the house are okay  · Drink plenty of liquids  as directed  Liquids help flush contrast liquid from your body  Ask how much liquid to drink each day and which liquids are best for you  · Wear a supportive bra  as directed  You may need to wait until you remove the tight-fitting bandage to wear a bra  You can wear a sports bra or a wireless bra that fits snugly  A supportive bra may help decrease swelling and pain  Follow up with your healthcare provider as directed:  Write down your questions so you remember to ask them during your visits     © 2017 [facility] [address] is for End User's use only and may not be sold, redistributed or otherwise used for commercial purposes  All illustrations and images included in CareNotes® are the copyrighted property of A D A M , Inc  or Darryl Mora  The above information is an  only  It is not intended as medical advice for individual conditions or treatments  Talk to your doctor, nurse or pharmacist before following any medical regimen to see if it is safe and effective for you

## 2018-10-31 NOTE — LETTER
October 31, 2018     Kraig Tolbert MD  [address] [place] [address] AFFILIATED WITH [place] [id number]    Patient: Bony Tong   YOB: 1945   Date of Visit: 10/31/2018       Dear Dr Jia Bermudez:    Thank you for referring Nancy Doyle to me for evaluation  Below are my notes for this consultation  If you have questions, please do not hesitate to call me  I look forward to following your patient along with you  Sincerely,        Sami Calzada PA-C        CC: Antonio Alfaro, DO  Sami Calzada PA-C  10/31/2018 11:42 AM  Vincent Kaplan 68 y o  female MRN: [id number]  Encounter: [id number]    Assessment/Plan    Abnormal findings on diagnostic imaging of breast  The patient has an abnormal finding on diagnostic imaging of her left breast  No suspicious findings by breast exam or history  To make a definitive diagnosis and exclude malignancy, further testing was recommended with US guided left breast biopsy  Details of the procedure and risks related to anesthesia, bleeding, infection, and hematoma were reviewed  Questions answered, informed consent to be obtained by Dr Sunshine Pickravinder  Follow up arranged  Diagnoses and all orders for this visit:    Abnormal findings on diagnostic imaging of breast  -     US guided breast biopsy left complete; Future        History of Present Illness   10- Patient is here today for pre op left breast biopsy  Patient did have a biopsy on this breast back in 1990 and the results were negative for cancer  The patient has a burring sensation on the left breast and denied any lumps or nipple discharge on her left breast   She denied any right breast pain,lumps or nipple discharge  Patient does not have any family history of breast cancer  Sita Cardona 69 yo female without significant PMH here for consideration of breast biopsy after abnormal breast imaging   She reports feeling well and has noticed no changes regarding her breasts  The finding was first detected on screening mammography  She has not missed and screenings recently  She has a remote history of benign breast biopsy in her left breast  She denies breast pain, swelling, lumps, rashes, changes in size or symmetry, enlarged lymph nodes in her neck chest or on her arms  Review of Systems   Constitutional: Negative for chills and fever  HENT: Negative for congestion  Eyes: Negative for visual disturbance  Respiratory: Negative for shortness of breath  Cardiovascular: Negative for chest pain  Gastrointestinal: Negative for abdominal pain, constipation, diarrhea, nausea and vomiting  Genitourinary: Negative for dysuria  Musculoskeletal: Negative for back pain  Skin: Negative for rash and wound  Neurological: Negative for dizziness and headaches  Psychiatric/Behavioral: Negative for confusion         Historical Information   Past Medical History:   Diagnosis Date    [facility]     Hyperlipidemia      Past Surgical History:   Procedure Laterality Date    BREAST SURGERY Left 1990    biopsy on left breast    CHOLECYSTECTOMY      HERNIA REPAIR      HYSTERECTOMY  04/15/2016     Social History   History   Alcohol Use No     History   Drug Use No     History   Smoking Status    Never Smoker   Smokeless Tobacco    Never Used     Family History   Problem Relation Age of Onset    Heart failure Mother     Diabetes Father        Meds/Allergies     Current Outpatient Prescriptions:     alendronate (FOSAMAX) 70 mg tablet, Take 1 tablet (70 mg total) by mouth every 7 days, Disp: 4 tablet, Rfl: 5    buPROPion (WELLBUTRIN XL) 150 mg 24 hr tablet, Take 1 tablet (150 mg total) by mouth daily, Disp: 30 tablet, Rfl: 5    irbesartan (AVAPRO) 150 mg tablet, Take 1 tablet (150 mg total) by mouth daily, Disp: 90 tablet, Rfl: 3    metFORMIN (GLUCOPHAGE) 500 mg tablet, Take 1 tablet (500 mg total) by mouth 2 (two) times a day, Disp: 180 tablet, Rfl: 1    metoprolol tartrate (LOPRESSOR) 25 mg tablet, Take 1 tablet (25 mg total) by mouth daily, Disp: 90 tablet, Rfl: 3    ofloxacin (OCUFLOX) 0 3 % ophthalmic solution, , Disp: , Rfl:     ONE TOUCH LANCETS MISC, by Does not apply route daily, Disp: , Rfl:     simvastatin (ZOCOR) 40 mg tablet, TAKE 1 TABLET BY MOUTH ONCE DAILY, Disp: 90 tablet, Rfl: 1    traZODone (DESYREL) 100 mg tablet, Take 1 tablet (100 mg total) by mouth daily at bedtime, Disp: 90 tablet, Rfl: 1    prednisoLONE acetate (PRED FORTE) 1 % ophthalmic suspension, , Disp: , Rfl:   No Known Allergies    The following portions of the patient's history were reviewed and updated as appropriate: allergies, current medications, past family history, past medical history, past social history, past surgical history and problem list     Objective   Current Vitals:   Blood pressure 112/68, pulse 66, resp  rate 16, height 4' 11" (1 499 m), weight 56 7 kg (125 lb)  Physical Exam   Constitutional: She is oriented to person, place, and time  She appears well-developed and well-nourished  HENT:   Head: Normocephalic and atraumatic  Eyes: Pupils are equal, round, and reactive to light  Conjunctivae are normal    Neck: Normal range of motion  Cardiovascular: Normal rate, regular rhythm and normal heart sounds  No murmur heard  Pulmonary/Chest: Effort normal and breath sounds normal  No respiratory distress  She has no wheezes  Right breast exhibits no inverted nipple, no mass, no nipple discharge, no skin change and no tenderness  Left breast exhibits no inverted nipple, no mass, no nipple discharge, no skin change and no tenderness  Breasts are symmetrical    No cervical, clavicular, axillary lymphadenopathy  Breasts are normal in size and contour bilaterally  No visible or palpable breast lumps or masses  No skin changes  No nipple inversion or discharge  Musculoskeletal: Normal range of motion     Lymphadenopathy: She has no cervical adenopathy  She has no axillary adenopathy  Neurological: She is alert and oriented to person, place, and time  Skin: Skin is warm and dry  Psychiatric: She has a normal mood and affect   Her behavior is normal        Signature:  Black Calzada PA-C  Date: 10/31/2018 Time: 11:37 AM

## 2018-10-31 NOTE — PROGRESS NOTES
[address] 68 y o  female MRN: [id number]  Encounter: [id number]    Assessment/Plan    Abnormal findings on diagnostic imaging of breast  The patient has an abnormal finding on diagnostic imaging of her left breast  No suspicious findings by breast exam or history  To make a definitive diagnosis and exclude malignancy, further testing was recommended with US guided left breast biopsy  Details of the procedure and risks related to anesthesia, bleeding, infection, and hematoma were reviewed  Questions answered, informed consent to be obtained by Dr Kandi Gan  Follow up arranged  Diagnoses and all orders for this visit:    Abnormal findings on diagnostic imaging of breast  -     US guided breast biopsy left complete; Future        History of Present Illness   10- Patient is here today for pre op left breast biopsy  Patient did have a biopsy on this breast back in 1990 and the results were negative for cancer  The patient has a burring sensation on the left breast and denied any lumps or nipple discharge on her left breast   She denied any right breast pain,lumps or nipple discharge  Patient does not have any family history of breast cancer  Lorena Cardona 69 yo female without significant PMH here for consideration of breast biopsy after abnormal breast imaging  She reports feeling well and has noticed no changes regarding her breasts  The finding was first detected on screening mammography  She has not missed and screenings recently  She has a remote history of benign breast biopsy in her left breast  She denies breast pain, swelling, lumps, rashes, changes in size or symmetry, enlarged lymph nodes in her neck chest or on her arms  Review of Systems   Constitutional: Negative for chills and fever  HENT: Negative for congestion  Eyes: Negative for visual disturbance  Respiratory: Negative for shortness of breath      Cardiovascular: Negative for chest pain  Gastrointestinal: Negative for abdominal pain, constipation, diarrhea, nausea and vomiting  Genitourinary: Negative for dysuria  Musculoskeletal: Negative for back pain  Skin: Negative for rash and wound  Neurological: Negative for dizziness and headaches  Psychiatric/Behavioral: Negative for confusion         Historical Information   Past Medical History:   Diagnosis Date    Diabetes ([address] )     Hyperlipidemia      Past Surgical History:   Procedure Laterality Date    BREAST SURGERY Left 1990    biopsy on left breast    CHOLECYSTECTOMY      HERNIA REPAIR      HYSTERECTOMY  04/15/2016     Social History   History   Alcohol Use No     History   Drug Use No     History   Smoking Status    Never Smoker   Smokeless Tobacco    Never Used     Family History   Problem Relation Age of Onset    Heart failure Mother     Diabetes Father        Meds/Allergies     Current Outpatient Prescriptions:     alendronate (FOSAMAX) 70 mg tablet, Take 1 tablet (70 mg total) by mouth every 7 days, Disp: 4 tablet, Rfl: 5    buPROPion (WELLBUTRIN XL) 150 mg 24 hr tablet, Take 1 tablet (150 mg total) by mouth daily, Disp: 30 tablet, Rfl: 5    irbesartan (AVAPRO) 150 mg tablet, Take 1 tablet (150 mg total) by mouth daily, Disp: 90 tablet, Rfl: 3    metFORMIN (GLUCOPHAGE) 500 mg tablet, Take 1 tablet (500 mg total) by mouth 2 (two) times a day, Disp: 180 tablet, Rfl: 1    metoprolol tartrate (LOPRESSOR) 25 mg tablet, Take 1 tablet (25 mg total) by mouth daily, Disp: 90 tablet, Rfl: 3    ofloxacin (OCUFLOX) 0 3 % ophthalmic solution, , Disp: , Rfl:     ONE TOUCH LANCETS MISC, by Does not apply route daily, Disp: , Rfl:     simvastatin (ZOCOR) 40 mg tablet, TAKE 1 TABLET BY MOUTH ONCE DAILY, Disp: 90 tablet, Rfl: 1    traZODone (DESYREL) 100 mg tablet, Take 1 tablet (100 mg total) by mouth daily at bedtime, Disp: 90 tablet, Rfl: 1    prednisoLONE acetate (PRED FORTE) 1 % ophthalmic suspension, , Disp: , Rfl: No Known Allergies    The following portions of the patient's history were reviewed and updated as appropriate: allergies, current medications, past family history, past medical history, past social history, past surgical history and problem list     Objective   Current Vitals:   Blood pressure 112/68, pulse 66, resp  rate 16, height 4' 11" (1 499 m), weight 56 7 kg (125 lb)  Physical Exam   Constitutional: She is oriented to person, place, and time  She appears well-developed and well-nourished  HENT:   Head: Normocephalic and atraumatic  Eyes: Pupils are equal, round, and reactive to light  Conjunctivae are normal    Neck: Normal range of motion  Cardiovascular: Normal rate, regular rhythm and normal heart sounds  No murmur heard  Pulmonary/Chest: Effort normal and breath sounds normal  No respiratory distress  She has no wheezes  Right breast exhibits no inverted nipple, no mass, no nipple discharge, no skin change and no tenderness  Left breast exhibits no inverted nipple, no mass, no nipple discharge, no skin change and no tenderness  Breasts are symmetrical    No cervical, clavicular, axillary lymphadenopathy  Breasts are normal in size and contour bilaterally  No visible or palpable breast lumps or masses  No skin changes  No nipple inversion or discharge  Musculoskeletal: Normal range of motion  Lymphadenopathy:     She has no cervical adenopathy  She has no axillary adenopathy  Neurological: She is alert and oriented to person, place, and time  Skin: Skin is warm and dry  Psychiatric: She has a normal mood and affect   Her behavior is normal        Signature:  Black Calzada PA-C  Date: 10/31/2018 Time: 11:37 AM

## 2018-11-03 DIAGNOSIS — M79.643 PAIN OF HAND, UNSPECIFIED LATERALITY: ICD-10-CM

## 2018-11-05 RX ORDER — TRAZODONE HYDROCHLORIDE 100 MG/1
TABLET ORAL
Qty: 90 TABLET | Refills: 1 | Status: SHIPPED | OUTPATIENT
Start: 2018-11-05 | End: 2019-05-11 | Stop reason: SDUPTHER

## 2018-11-13 ENCOUNTER — HOSPITAL ENCOUNTER (OUTPATIENT)
Dept: ULTRASOUND IMAGING | Facility: HOSPITAL | Age: 73
Discharge: HOME | End: 2018-11-13
Payer: MEDICARE

## 2018-11-13 VITALS
RESPIRATION RATE: 20 BRPM | HEART RATE: 82 BPM | DIASTOLIC BLOOD PRESSURE: 82 MMHG | TEMPERATURE: 97.1 F | SYSTOLIC BLOOD PRESSURE: 134 MMHG

## 2018-11-13 DIAGNOSIS — R92.8 ABNORMAL FINDINGS ON DIAGNOSTIC IMAGING OF BREAST: ICD-10-CM

## 2018-11-13 PROCEDURE — 19083 BX BREAST 1ST LESION US IMAG: CPT

## 2018-11-13 PROCEDURE — 88305 TISSUE EXAM BY PATHOLOGIST: CPT | Performed by: PATHOLOGY

## 2018-11-13 PROCEDURE — 19083 BX BREAST 1ST LESION US IMAG: CPT | Performed by: SURGERY

## 2018-11-13 NOTE — PROCEDURES
Procedures--pt arrived to ultrasound department for scheduled left breast biopsy  Pt was identified and we waited for Dr Laura Skelton to come and identify pt  Dr Laura Skelton here and we did a time out and Dr Laura Skelton injected a local anesthetic of 1% lidocaine with epi and NaBicarb (11cc total) into pt's left breast area  Left breast biopsies were taken and sent to the lab for analysis  Pt tolerated procedure well and I held pressure at left breast biopsy site post procedure  I then put 1/4 inch steristrips and a folded 4x4 and a large tegaderm at left breast biopsy site  Pt was then discharged in satisfactory condition with her brother with a dry dressing at her left breast and pt had no complaints

## 2018-11-19 NOTE — PROCEDURES
Procedures  Operative note    Preoperative diagnosis:  Nonpalpable left breast density     Postoperative diagnosis:  Nonpalpable left breast density      Title of operation:  Ultrasound-guided vacuum assisted needle core left breast biopsy    Surgeon:  Mick Garcia MD, FACS    Assistant:  None    Anesthesia:  1% lidocaine local anesthesia with epinephrine and bicarbonate    Estimated blood loss:  Less than 5 cc    Complications:  None    Specimen:  8 gauge needle core mammotome biopsy specimens obtained from the left breast using vacuum assistance and real-time ultrasound guidance  Indications for procedure: The patient is a pleasant 72-year-old female presenting with a nonpalpable left breast density and a BI-RADS 4 left breast ultrasound for which needle core biopsy is indicated to exclude malignancy  Operative findings: The lesion was confidently identified by the radiology technician and surgeon together  Time-out performed  Under real-time ultrasound guidance using vacuum assistance and an 8 gauge mammotome, needle core biopsies were obtained during which time the lesion disappeared tightly from view  At the procedures conclusion a clip was placed in the wound bed under real-time ultrasound guidance and the mammotome withdrawn  Direct pressure held on the breast for 5 min  Good hemostasis found  The wound dressed and the patient left the room under her own power  She tolerated the procedure well  Operative technique:  The patient was taken to the ultrasound lab where she was properly identified  She was placed supine on the ultrasound table and left breast ultrasound performed  The lesion confidently localized  The skin then prepped with Betadine, infiltrated with 1% lidocaine local anesthesia with epinephrine and bicarbonate and incised  Deep tissues infiltrated with spinal needle    Under real-time ultrasound guidance, using an 8 gauge mammotome and vacuum assistance, needle core biopsies obtained circumferentially during which time the lesion disappeared entirely from view  At the procedures conclusion a clip placed in the wound bed under real-time ultrasound guidance  The mammotome withdrawn  Direct pressure held on the breast for 5 min  Hemostasis found  The wound dressed and the patient left the room under her own power

## 2018-11-26 ENCOUNTER — OFFICE VISIT (OUTPATIENT)
Dept: SURGERY | Facility: CLINIC | Age: 73
End: 2018-11-26
Payer: MEDICARE

## 2018-11-26 DIAGNOSIS — R92.8 ABNORMAL FINDINGS ON DIAGNOSTIC IMAGING OF BREAST: Primary | ICD-10-CM

## 2018-11-26 PROCEDURE — 99212 OFFICE O/P EST SF 10 MIN: CPT | Performed by: PHYSICIAN ASSISTANT

## 2018-11-26 NOTE — ASSESSMENT & PLAN NOTE
The patient has done well after US guided needle core biopsy  Her benign pathology results were reviewed  We will see her back after accelerated breast imaging in 6 months  Questions answered, follow up arranged

## 2018-11-26 NOTE — PROGRESS NOTES
Post-Op Note - General Surgery   Saskia Morrison 68 y o  female MRN: [id number]  Encounter: [id number]    Assessment/Plan    Abnormal findings on diagnostic imaging of breast  The patient has done well after US guided needle core biopsy  Her benign pathology results were reviewed  We will see her back after accelerated breast imaging in 6 months  Questions answered, follow up arranged  Diagnoses and all orders for this visit:    Abnormal findings on diagnostic imaging of breast  -     Mammo diagnostic left w 3d & cad; Future  -     US breast left limited (diagnostic); Future        Subjective    11-  Patient is here today in follow up to her left breast stereotactic biopsy 11-  The patient has some bruising and she denied any new problems or concerns  Dulce Blair 35-year-old female here for follow-up of her ultrasound-guided left breast needle core biopsy  She denies any complaints today  Denies redness, swelling, drainage, fever  No new complaints referable to her breasts  Review of Systems   Constitutional: Negative for chills and fever  Respiratory: Negative for shortness of breath  Cardiovascular: Negative for chest pain  Gastrointestinal: Negative for abdominal pain, constipation, diarrhea, nausea and vomiting  Genitourinary: Negative for dysuria  Neurological: Negative for headaches  The following portions of the patient's history were reviewed and updated as appropriate: allergies, current medications, past family history, past medical history, past social history, past surgical history and problem list     Objective      There were no vitals taken for this visit  Physical Exam   Constitutional: She is oriented to person, place, and time  She appears well-developed and well-nourished  No distress  HENT:   Head: Normocephalic and atraumatic  Eyes: Pupils are equal, round, and reactive to light   Conjunctivae and EOM are normal    Neck: Normal range of motion  Pulmonary/Chest: No respiratory distress  Musculoskeletal: Normal range of motion  Neurological: She is alert and oriented to person, place, and time  Skin: Skin is warm and dry  Capillary refill takes less than 2 seconds  She is not diaphoretic  Psychiatric: She has a normal mood and affect   Her behavior is normal        Signature:  Black Calzada PA-C  Date: 11/26/2018 Time: 10:23 AM

## 2018-11-26 NOTE — LETTER
November 26, 2018     Greg Spear, [address] [place] [address]    Patient: Tate Cheung   YOB: 1945   Date of Visit: 11/26/2018       Dear Dr Lexy Thompson:    Thank you for referring Janessa Ritchie to me for evaluation  Below are my notes for this consultation  If you have questions, please do not hesitate to call me  I look forward to following your patient along with you  Sincerely,        Hector Calzada PA-C        CC: No Recipients  Vivian Calzada PA-C  11/26/2018 10:25 AM  Sign at close encounter  Post-Op Note - General Surgery   Tate Cheung 68 y o  female MRN: [id number]  Encounter: [id number]    Assessment/Plan    Abnormal findings on diagnostic imaging of breast  The patient has done well after US guided needle core biopsy  Her benign pathology results were reviewed  We will see her back after accelerated breast imaging in 6 months  Questions answered, follow up arranged  Diagnoses and all orders for this visit:    Abnormal findings on diagnostic imaging of breast  -     Mammo diagnostic left w 3d & cad; Future  -     US breast left limited (diagnostic); Future        Subjective    11-  Patient is here today in follow up to her left breast stereotactic biopsy 11-  The patient has some bruising and she denied any new problems or concerns  Divya Parnell,    Dulce 28-year-old female here for follow-up of her ultrasound-guided left breast needle core biopsy  She denies any complaints today  Denies redness, swelling, drainage, fever  No new complaints referable to her breasts  Review of Systems   Constitutional: Negative for chills and fever  Respiratory: Negative for shortness of breath  Cardiovascular: Negative for chest pain  Gastrointestinal: Negative for abdominal pain, constipation, diarrhea, nausea and vomiting  Genitourinary: Negative for dysuria     Neurological: Negative for headaches  The following portions of the patient's history were reviewed and updated as appropriate: allergies, current medications, past family history, past medical history, past social history, past surgical history and problem list     Objective      There were no vitals taken for this visit  Physical Exam   Constitutional: She is oriented to person, place, and time  She appears well-developed and well-nourished  No distress  HENT:   Head: Normocephalic and atraumatic  Eyes: Pupils are equal, round, and reactive to light  Conjunctivae and EOM are normal    Neck: Normal range of motion  Pulmonary/Chest: No respiratory distress  Musculoskeletal: Normal range of motion  Neurological: She is alert and oriented to person, place, and time  Skin: Skin is warm and dry  Capillary refill takes less than 2 seconds  She is not diaphoretic  Psychiatric: She has a normal mood and affect   Her behavior is normal        Signature:  Black Calzada PA-C  Date: 11/26/2018 Time: 10:23 AM

## 2018-11-28 DIAGNOSIS — M81.0 OSTEOPOROSIS, UNSPECIFIED OSTEOPOROSIS TYPE, UNSPECIFIED PATHOLOGICAL FRACTURE PRESENCE: ICD-10-CM

## 2018-11-28 RX ORDER — ALENDRONATE SODIUM 70 MG/1
TABLET ORAL
Qty: 4 TABLET | Refills: 5 | Status: SHIPPED | OUTPATIENT
Start: 2018-11-28 | End: 2019-05-27 | Stop reason: SDUPTHER

## 2018-11-29 ENCOUNTER — APPOINTMENT (OUTPATIENT)
Dept: LAB | Facility: CLINIC | Age: 73
End: 2018-11-29
Payer: MEDICARE

## 2018-11-29 ENCOUNTER — TRANSCRIBE ORDERS (OUTPATIENT)
Dept: LAB | Facility: CLINIC | Age: 73
End: 2018-11-29

## 2018-11-29 DIAGNOSIS — I10 ESSENTIAL HYPERTENSION: ICD-10-CM

## 2018-11-29 DIAGNOSIS — F32.A DEPRESSION, UNSPECIFIED DEPRESSION TYPE: ICD-10-CM

## 2018-11-29 DIAGNOSIS — E11.9 TYPE 2 DIABETES MELLITUS WITHOUT COMPLICATION, WITHOUT LONG-TERM CURRENT USE OF INSULIN (HCC): ICD-10-CM

## 2018-11-29 LAB
ALBUMIN SERPL BCP-MCNC: 3.3 G/DL (ref 3.5–5)
ALP SERPL-CCNC: 57 U/L (ref 46–116)
ALT SERPL W P-5'-P-CCNC: 27 U/L (ref 12–78)
ANION GAP SERPL CALCULATED.3IONS-SCNC: 4 MMOL/L (ref 4–13)
AST SERPL W P-5'-P-CCNC: 15 U/L (ref 5–45)
BASOPHILS # BLD AUTO: 0.03 THOUSANDS/ΜL (ref 0–0.1)
BASOPHILS NFR BLD AUTO: 1 % (ref 0–1)
BILIRUB SERPL-MCNC: 0.64 MG/DL (ref 0.2–1)
BUN SERPL-MCNC: 12 MG/DL (ref 5–25)
CALCIUM SERPL-MCNC: 8.6 MG/DL (ref 8.3–10.1)
CHLORIDE SERPL-SCNC: 107 MMOL/L (ref 100–108)
CHOLEST SERPL-MCNC: 107 MG/DL (ref 50–200)
CO2 SERPL-SCNC: 30 MMOL/L (ref 21–32)
CREAT SERPL-MCNC: 0.84 MG/DL (ref 0.6–1.3)
EOSINOPHIL # BLD AUTO: 0.31 THOUSAND/ΜL (ref 0–0.61)
EOSINOPHIL NFR BLD AUTO: 6 % (ref 0–6)
ERYTHROCYTE [DISTWIDTH] IN BLOOD BY AUTOMATED COUNT: 13.5 % (ref 11.6–15.1)
GFR SERPL CREATININE-BSD FRML MDRD: 69 ML/MIN/1.73SQ M
GLUCOSE P FAST SERPL-MCNC: 100 MG/DL (ref 65–99)
HCT VFR BLD AUTO: 43.1 % (ref 34.8–46.1)
HDLC SERPL-MCNC: 52 MG/DL (ref 40–60)
HGB BLD-MCNC: 13.6 G/DL (ref 11.5–15.4)
IMM GRANULOCYTES # BLD AUTO: 0.01 THOUSAND/UL (ref 0–0.2)
IMM GRANULOCYTES NFR BLD AUTO: 0 % (ref 0–2)
LDLC SERPL CALC-MCNC: 34 MG/DL (ref 0–100)
LYMPHOCYTES # BLD AUTO: 1.47 THOUSANDS/ΜL (ref 0.6–4.47)
LYMPHOCYTES NFR BLD AUTO: 28 % (ref 14–44)
MCH RBC QN AUTO: 30.5 PG (ref 26.8–34.3)
MCHC RBC AUTO-ENTMCNC: 31.6 G/DL (ref 31.4–37.4)
MCV RBC AUTO: 97 FL (ref 82–98)
MONOCYTES # BLD AUTO: 0.64 THOUSAND/ΜL (ref 0.17–1.22)
MONOCYTES NFR BLD AUTO: 12 % (ref 4–12)
NEUTROPHILS # BLD AUTO: 2.73 THOUSANDS/ΜL (ref 1.85–7.62)
NEUTS SEG NFR BLD AUTO: 53 % (ref 43–75)
NONHDLC SERPL-MCNC: 55 MG/DL
NRBC BLD AUTO-RTO: 0 /100 WBCS
PLATELET # BLD AUTO: 165 THOUSANDS/UL (ref 149–390)
PMV BLD AUTO: 10.2 FL (ref 8.9–12.7)
POTASSIUM SERPL-SCNC: 3.9 MMOL/L (ref 3.5–5.3)
PROT SERPL-MCNC: 6.6 G/DL (ref 6.4–8.2)
RBC # BLD AUTO: 4.46 MILLION/UL (ref 3.81–5.12)
SODIUM SERPL-SCNC: 141 MMOL/L (ref 136–145)
TRIGL SERPL-MCNC: 107 MG/DL
TSH SERPL DL<=0.05 MIU/L-ACNC: 2.8 UIU/ML (ref 0.36–3.74)
WBC # BLD AUTO: 5.19 THOUSAND/UL (ref 4.31–10.16)

## 2018-11-29 PROCEDURE — 83036 HEMOGLOBIN GLYCOSYLATED A1C: CPT

## 2018-11-29 PROCEDURE — 84443 ASSAY THYROID STIM HORMONE: CPT

## 2018-11-29 PROCEDURE — 80061 LIPID PANEL: CPT

## 2018-11-29 PROCEDURE — 84681 ASSAY OF C-PEPTIDE: CPT

## 2018-11-29 PROCEDURE — 80053 COMPREHEN METABOLIC PANEL: CPT

## 2018-11-29 PROCEDURE — 85025 COMPLETE CBC W/AUTO DIFF WBC: CPT

## 2018-11-29 PROCEDURE — 36415 COLL VENOUS BLD VENIPUNCTURE: CPT

## 2018-11-30 LAB
C PEPTIDE SERPL-MCNC: 3.8 NG/ML (ref 1.1–4.4)
EST. AVERAGE GLUCOSE BLD GHB EST-MCNC: 128 MG/DL
HBA1C MFR BLD: 6.1 % (ref 4.2–6.3)

## 2018-12-07 ENCOUNTER — OFFICE VISIT (OUTPATIENT)
Dept: FAMILY MEDICINE CLINIC | Facility: CLINIC | Age: 73
End: 2018-12-07
Payer: MEDICARE

## 2018-12-07 VITALS
BODY MASS INDEX: 25.32 KG/M2 | HEIGHT: 59 IN | WEIGHT: 125.6 LBS | SYSTOLIC BLOOD PRESSURE: 118 MMHG | DIASTOLIC BLOOD PRESSURE: 82 MMHG | TEMPERATURE: 98.6 F

## 2018-12-07 DIAGNOSIS — E78.00 HYPERCHOLESTEROLEMIA: ICD-10-CM

## 2018-12-07 DIAGNOSIS — E11.9 TYPE 2 DIABETES MELLITUS WITHOUT COMPLICATION, WITHOUT LONG-TERM CURRENT USE OF INSULIN (HCC): ICD-10-CM

## 2018-12-07 DIAGNOSIS — I10 HYPERTENSION, UNSPECIFIED TYPE: ICD-10-CM

## 2018-12-07 DIAGNOSIS — Z00.00 MEDICARE ANNUAL WELLNESS VISIT, SUBSEQUENT: Primary | ICD-10-CM

## 2018-12-07 PROCEDURE — G0439 PPPS, SUBSEQ VISIT: HCPCS | Performed by: FAMILY MEDICINE

## 2018-12-07 PROCEDURE — 99214 OFFICE O/P EST MOD 30 MIN: CPT | Performed by: FAMILY MEDICINE

## 2018-12-07 NOTE — PROGRESS NOTES
Assessment/Plan:    No problem-specific Assessment & Plan notes found for this encounter  Diagnoses and all orders for this visit:    Medicare annual wellness visit, subsequent    Type 2 diabetes mellitus without complication, without long-term current use of insulin ([facility] )  Comments:  well controlled  Orders:  -     Microalbumin / creatinine urine ratio  -     C-peptide; Future  -     Glucose, fasting; Future  -     Hemoglobin A1C; Future    Hypercholesterolemia  Comments:  well controlled    Hypertension, unspecified type  Comments:  no change in the medication          Subjective:      Patient ID: Aylin Reyes is a 68 y o  female  Follow up for HTN which is well controlled, follow up for cholesterol which is well controlled      Diabetes   She presents for her follow-up diabetic visit  She has type 2 diabetes mellitus  There are no hypoglycemic associated symptoms  Pertinent negatives for hypoglycemia include no seizures  There are no diabetic associated symptoms  Pertinent negatives for diabetes include no chest pain and no fatigue  There are no hypoglycemic complications  Risk factors for coronary artery disease include sedentary lifestyle  Current diabetic treatment includes oral agent (monotherapy)  She is compliant with treatment most of the time  She is following a diabetic diet  Her overall blood glucose range is  mg/dl  An ACE inhibitor/angiotensin II receptor blocker is being taken  Eye exam is current  The following portions of the patient's history were reviewed and updated as appropriate: allergies, current medications, past family history, past medical history, past social history, past surgical history and problem list     Review of Systems   Constitutional: Negative for chills, fatigue and fever  HENT: Negative  Eyes: Negative  Respiratory: Negative for shortness of breath and wheezing  Cardiovascular: Negative for chest pain and palpitations  Gastrointestinal: Negative for abdominal pain, blood in stool, constipation, diarrhea, nausea and vomiting  Endocrine: Negative  Genitourinary: Negative for dysuria  Musculoskeletal: Positive for arthralgias  Negative for myalgias  Skin: Negative  Allergic/Immunologic: Negative  Neurological: Negative for seizures and syncope  Hematological: Negative for adenopathy  Psychiatric/Behavioral: Negative  Objective:      /82 (BP Location: Left arm, Patient Position: Sitting, Cuff Size: Adult)   Temp 98 6 °F (37 °C) (Tympanic)   Ht 4' 11" (1 499 m)   Wt 57 kg (125 lb 9 6 oz)   BMI 25 37 kg/m²          Physical Exam   Constitutional: She is oriented to person, place, and time  She appears well-developed and well-nourished  No distress  HENT:   Head: Normocephalic and atraumatic  Right Ear: External ear normal    Left Ear: External ear normal    Nose: Nose normal    Mouth/Throat: Oropharynx is clear and moist    Eyes: Pupils are equal, round, and reactive to light  Conjunctivae and EOM are normal    Neck: Normal range of motion  Neck supple  Cardiovascular: Normal rate, regular rhythm and normal heart sounds  No murmur heard  Pulmonary/Chest: Effort normal and breath sounds normal  No respiratory distress  She has no wheezes  She has no rales  Abdominal: Soft  Bowel sounds are normal  She exhibits no distension and no mass  There is no tenderness  There is no rebound and no guarding  Musculoskeletal: Normal range of motion  She exhibits no edema  Neurological: She is alert and oriented to person, place, and time  She has normal reflexes  She exhibits normal muscle tone  Skin: Skin is warm and dry  No rash noted  She is not diaphoretic  No erythema  No pallor  Psychiatric: She has a normal mood and affect  Her behavior is normal  Judgment and thought content normal    Nursing note and vitals reviewed

## 2018-12-07 NOTE — PROGRESS NOTES
Assessment and Plan:    Problem List Items Addressed This Visit     None      Visit Diagnoses     Medicare annual wellness visit, subsequent    -  Primary        Health Maintenance Due   Topic Date Due    Hepatitis C Screening  1945    URINE MICROALBUMIN  03/30/1955    DTaP,Tdap,and Td Vaccines (1 - Tdap) 03/30/1966    Urinary Incontinence Screening  03/30/2010    DM Eye Exam  06/15/2018    Medicare Annual Wellness Visit (AWV)  08/11/2018         HPI:  Leigh Ann Christianson is a 68 y o  female here for her Subsequent Wellness Visit      Patient Active Problem List   Diagnosis    Abnormal findings on diagnostic imaging of breast     Past Medical History:   Diagnosis Date    Diabetes ([address] )     Hyperlipidemia      Past Surgical History:   Procedure Laterality Date    BREAST BIOPSY Left 1990    benign per patient report    BREAST SURGERY Left 1990    biopsy on left breast    CHOLECYSTECTOMY      HERNIA REPAIR      HYSTERECTOMY  04/15/2016    US GUIDED BREAST BIOPSY LEFT COMPLETE Left 11/13/2018     Family History   Problem Relation Age of Onset    Heart failure Mother     Diabetes Father      History   Smoking Status    Never Smoker   Smokeless Tobacco    Never Used     History   Alcohol Use No      History   Drug Use No       Current Outpatient Prescriptions   Medication Sig Dispense Refill    alendronate (FOSAMAX) 70 mg tablet TAKE 1 TABLET BY MOUTH ONCE A WEEK (7  DAYS) 4 tablet 5    buPROPion (WELLBUTRIN XL) 150 mg 24 hr tablet Take 1 tablet (150 mg total) by mouth daily 30 tablet 5    irbesartan (AVAPRO) 150 mg tablet Take 1 tablet (150 mg total) by mouth daily 90 tablet 3    metFORMIN (GLUCOPHAGE) 500 mg tablet Take 1 tablet (500 mg total) by mouth 2 (two) times a day 180 tablet 1    metoprolol tartrate (LOPRESSOR) 25 mg tablet Take 1 tablet (25 mg total) by mouth daily 90 tablet 3    ofloxacin (OCUFLOX) 0 3 % ophthalmic solution       ONE TOUCH LANCETS MISC by Does not apply route daily      prednisoLONE acetate (PRED FORTE) 1 % ophthalmic suspension       simvastatin (ZOCOR) 40 mg tablet TAKE 1 TABLET BY MOUTH ONCE DAILY 90 tablet 1    traZODone (DESYREL) 100 mg tablet TAKE 1 TABLET BY MOUTH ONCE DAILY AT BEDTIME 90 tablet 1     No current facility-administered medications for this visit  No Known Allergies  Immunization History   Administered Date(s) Administered    Influenza 09/25/2013, 09/07/2018    Influenza Split High Dose Preservative Free IM 11/09/2017    Influenza TIV (IM) 10/20/2010, 01/20/2012, 09/18/2012, 10/28/2014, 09/16/2016    Influenza, high dose seasonal 0 5 mL 09/07/2018    Pneumococcal Conjugate 13-Valent 11/09/2017    Pneumococcal Polysaccharide PPV23 08/23/2010       Patient Care Team:  Gerard Gibbons, DO as PCP - General  Gerard Gibbons, DO    Medicare Screening Tests and Risk Assessments:      Health Risk Assessment:  Patient rates overall health as good  Patient feels that their physical health rating is Slightly better  Eyesight was rated as Same  Hearing was rated as Same  Patient feels that their emotional and mental health rating is Same  Pain experienced by patient in the last 7 days has been None  Patient states that she has experienced no weight loss or gain in last 6 months  Emotional/Mental Health:  Patient has been feeling nervous/anxious  PHQ-9 Depression Screening:    Frequency of the following problems over the past two weeks:      1  Little interest or pleasure in doing things: 0 - not at all      2  Feeling down, depressed, or hopeless: 0 - not at all  PHQ-2 Score: 0          Broken Bones/Falls: Fall Risk Assessment:    In the past year, patient has experienced: No history of falling in past year          Bladder/Bowel:  Patient has not leaked urine accidently in the last six months  Patient reports no loss of bowel control  Immunizations:  Patient has had a flu vaccination within the last year    Patient has received a pneumonia shot  Patient has not received a shingles shot  Patient has received tetanus/diphtheria shot  Home Safety:  Patient does not have trouble with stairs inside or outside of their home  Patient currently reports that there are no safety hazards present in home, working smoke alarms, working carbon monoxide detectors  Preventative Screenings:   Breast cancer screening performed, colon cancer screen completed, cholesterol screen completed, glaucoma eye exam completed,     Nutrition:  Current diet: Regular with servings of the following:    Medications:  Patient is not currently taking any over-the-counter supplements  Patient is able to manage medications  Lifestyle Choices:  Patient reports no tobacco use  Patient has not smoked or used tobacco in the past   Patient reports no alcohol use  Patient drives a vehicle  Patient wears seat belt  Activities of Daily Living:  Can get out of bed by his or her self, able to dress self, able to make own meals, able to do own shopping, able to bathe self, can do own laundry/housekeeping, can manage own money, pay bills and track expenses    Previous Hospitalizations:  No hospitalization or ED visit in past 12 months        Advanced Directives:  Patient has decided on a power of   Patient has spoken to designated power of   Patient has completed advanced directive          Preventative Screening/Counseling:      Cardiovascular:      General: Screening Not Indicated          Diabetes:      General: Screening Current          Colorectal Cancer:      General: Screening Current          Breast Cancer:      General: Screening Current          Cervical Cancer:      General: Screening Current          Osteoporosis:      General: Screening Current          AAA:      General: Screening Not Indicated          Glaucoma:      General: Screening Current          HIV:      General: Screening Not Indicated          Hepatitis C:      General: Screening Not Indicated        Advanced Directives:   Patient has living will for healthcare, has durable POA for healthcare, patient has an advanced directive  End of life assessment reviewed with patient  Provider agrees with end of life decisions

## 2018-12-12 LAB
LEFT EYE DIABETIC RETINOPATHY: NORMAL
RIGHT EYE DIABETIC RETINOPATHY: NORMAL

## 2019-01-26 DIAGNOSIS — E11.9 TYPE 2 DIABETES MELLITUS WITHOUT COMPLICATION, WITHOUT LONG-TERM CURRENT USE OF INSULIN (HCC): ICD-10-CM

## 2019-01-29 DIAGNOSIS — I10 ESSENTIAL HYPERTENSION: ICD-10-CM

## 2019-02-05 DIAGNOSIS — E11.9 TYPE 2 DIABETES MELLITUS WITHOUT COMPLICATION, WITHOUT LONG-TERM CURRENT USE OF INSULIN (HCC): Primary | ICD-10-CM

## 2019-02-05 DIAGNOSIS — I10 ESSENTIAL HYPERTENSION: ICD-10-CM

## 2019-02-05 RX ORDER — IRBESARTAN 300 MG/1
300 TABLET ORAL DAILY
Qty: 15 TABLET | Refills: 0 | OUTPATIENT
Start: 2019-02-05 | End: 2019-03-09 | Stop reason: SDUPTHER

## 2019-02-11 DIAGNOSIS — E11.9 TYPE 2 DIABETES MELLITUS WITHOUT COMPLICATION, WITHOUT LONG-TERM CURRENT USE OF INSULIN (HCC): Primary | ICD-10-CM

## 2019-02-16 DIAGNOSIS — F32.A DEPRESSION, UNSPECIFIED DEPRESSION TYPE: ICD-10-CM

## 2019-02-18 RX ORDER — BUPROPION HYDROCHLORIDE 150 MG/1
TABLET ORAL
Qty: 30 TABLET | Refills: 5 | Status: SHIPPED | OUTPATIENT
Start: 2019-02-18 | End: 2019-09-03 | Stop reason: SDUPTHER

## 2019-03-05 ENCOUNTER — APPOINTMENT (OUTPATIENT)
Dept: LAB | Facility: CLINIC | Age: 74
End: 2019-03-05
Payer: MEDICARE

## 2019-03-05 ENCOUNTER — TRANSCRIBE ORDERS (OUTPATIENT)
Dept: LAB | Facility: CLINIC | Age: 74
End: 2019-03-05

## 2019-03-05 DIAGNOSIS — E11.9 TYPE 2 DIABETES MELLITUS WITHOUT COMPLICATION, WITHOUT LONG-TERM CURRENT USE OF INSULIN (HCC): ICD-10-CM

## 2019-03-05 LAB
CREAT UR-MCNC: 145 MG/DL
EST. AVERAGE GLUCOSE BLD GHB EST-MCNC: 123 MG/DL
GLUCOSE P FAST SERPL-MCNC: 110 MG/DL (ref 65–99)
HBA1C MFR BLD: 5.9 % (ref 4.2–6.3)
MICROALBUMIN UR-MCNC: 38.9 MG/L (ref 0–20)
MICROALBUMIN/CREAT 24H UR: 27 MG/G CREATININE (ref 0–30)

## 2019-03-05 PROCEDURE — 82947 ASSAY GLUCOSE BLOOD QUANT: CPT

## 2019-03-05 PROCEDURE — 83036 HEMOGLOBIN GLYCOSYLATED A1C: CPT

## 2019-03-05 PROCEDURE — 82043 UR ALBUMIN QUANTITATIVE: CPT | Performed by: FAMILY MEDICINE

## 2019-03-05 PROCEDURE — 82570 ASSAY OF URINE CREATININE: CPT | Performed by: FAMILY MEDICINE

## 2019-03-05 PROCEDURE — 36415 COLL VENOUS BLD VENIPUNCTURE: CPT

## 2019-03-05 PROCEDURE — 84681 ASSAY OF C-PEPTIDE: CPT

## 2019-03-06 LAB — C PEPTIDE SERPL-MCNC: 5.4 NG/ML (ref 1.1–4.4)

## 2019-03-09 DIAGNOSIS — I10 ESSENTIAL HYPERTENSION: ICD-10-CM

## 2019-03-11 ENCOUNTER — OFFICE VISIT (OUTPATIENT)
Dept: FAMILY MEDICINE CLINIC | Facility: CLINIC | Age: 74
End: 2019-03-11
Payer: MEDICARE

## 2019-03-11 VITALS
BODY MASS INDEX: 25.8 KG/M2 | SYSTOLIC BLOOD PRESSURE: 140 MMHG | HEIGHT: 59 IN | WEIGHT: 128 LBS | TEMPERATURE: 98 F | DIASTOLIC BLOOD PRESSURE: 70 MMHG

## 2019-03-11 DIAGNOSIS — I10 ESSENTIAL HYPERTENSION: ICD-10-CM

## 2019-03-11 DIAGNOSIS — E66.3 OVERWEIGHT (BMI 25.0-29.9): ICD-10-CM

## 2019-03-11 DIAGNOSIS — E11.9 TYPE 2 DIABETES MELLITUS WITHOUT COMPLICATION, WITHOUT LONG-TERM CURRENT USE OF INSULIN (HCC): Primary | ICD-10-CM

## 2019-03-11 PROCEDURE — 99214 OFFICE O/P EST MOD 30 MIN: CPT | Performed by: FAMILY MEDICINE

## 2019-03-11 RX ORDER — IRBESARTAN 300 MG/1
TABLET ORAL
Qty: 15 TABLET | Refills: 0 | Status: SHIPPED | OUTPATIENT
Start: 2019-03-11 | End: 2019-04-13 | Stop reason: SDUPTHER

## 2019-03-11 NOTE — PROGRESS NOTES
Assessment/Plan:    No problem-specific Assessment & Plan notes found for this encounter  Diagnoses and all orders for this visit:    Type 2 diabetes mellitus without complication, without long-term current use of insulin ([facility])  Comments:  no change in the medication  Orders:  -     C-peptide; Future  -     Glucose, fasting; Future  -     Hemoglobin A1C; Future    Essential hypertension  Comments:  no change in the medication    Overweight (BMI 25 0-29  9)  Comments:  pt counseled on diet and exercise        BMI Counseling: Body mass index is 25 85 kg/m²  Discussed the patient's BMI with her  The BMI is above average  BMI counseling and education was provided to the patient  Nutrition recommendations include reducing portion sizes and 3-5 servings of fruits/vegetables daily  Exercise recommendations include moderate aerobic physical activity for 150 minutes/week and exercising 3-5 times per week  Subjective:      Patient ID: Leigh Ann Christianson is a 68 y o  female  Follow up for HTN, pt does not check Bps at home, pt is taking avapro which is working well, pt is slightly overweight    Diabetes   She presents for her follow-up diabetic visit  She has type 2 diabetes mellitus  There are no hypoglycemic associated symptoms  Pertinent negatives for diabetes include no chest pain  There are no hypoglycemic complications  Risk factors for coronary artery disease include post-menopausal and sedentary lifestyle  Current diabetic treatment includes oral agent (monotherapy)  She is compliant with treatment most of the time  Her weight is stable  She is following a diabetic diet  Her overall blood glucose range is  mg/dl  An ACE inhibitor/angiotensin II receptor blocker is being taken  Eye exam is current         The following portions of the patient's history were reviewed and updated as appropriate: allergies, current medications, past family history, past medical history, past social history, past surgical history and problem list     Review of Systems   Constitutional: Negative for chills and fever  Eyes: Negative for visual disturbance  Cardiovascular: Negative for chest pain and palpitations  Gastrointestinal: Negative for abdominal pain, nausea and vomiting  Genitourinary: Negative for frequency  Skin: Negative for wound  Neurological: Negative for numbness  Objective:      /70   Temp 98 °F (36 7 °C)   Ht 4' 11" (1 499 m)   Wt 58 1 kg (128 lb)   BMI 25 85 kg/m²          Physical Exam   Constitutional: She is oriented to person, place, and time  She appears well-developed and well-nourished  No distress  HENT:   Head: Normocephalic and atraumatic  Eyes: Pupils are equal, round, and reactive to light  Conjunctivae and EOM are normal  No scleral icterus  Neck: Normal range of motion  Neck supple  Cardiovascular: Normal rate, regular rhythm and normal heart sounds  No murmur heard  Pulmonary/Chest: Effort normal and breath sounds normal  No respiratory distress  She has no wheezes  She has no rales  Abdominal: Soft  Bowel sounds are normal  She exhibits no distension and no mass  There is no tenderness  There is no rebound and no guarding  Musculoskeletal: Normal range of motion  She exhibits no edema or deformity  Lymphadenopathy:     She has no cervical adenopathy  Neurological: She is alert and oriented to person, place, and time  Skin: Skin is warm and dry  She is not diaphoretic  Psychiatric: She has a normal mood and affect  Her behavior is normal  Judgment and thought content normal    Nursing note and vitals reviewed

## 2019-03-30 DIAGNOSIS — E78.00 HYPERCHOLESTEROLEMIA: ICD-10-CM

## 2019-04-01 RX ORDER — SIMVASTATIN 40 MG
TABLET ORAL
Qty: 90 TABLET | Refills: 1 | Status: SHIPPED | OUTPATIENT
Start: 2019-04-01 | End: 2019-07-22 | Stop reason: SDUPTHER

## 2019-04-02 ENCOUNTER — HOSPITAL ENCOUNTER (OUTPATIENT)
Dept: ULTRASOUND IMAGING | Facility: HOSPITAL | Age: 74
Discharge: HOME | End: 2019-04-02
Payer: MEDICARE

## 2019-04-02 ENCOUNTER — HOSPITAL ENCOUNTER (OUTPATIENT)
Dept: MAMMOGRAPHY | Facility: HOSPITAL | Age: 74
Discharge: HOME | End: 2019-04-02
Payer: MEDICARE

## 2019-04-02 VITALS — HEIGHT: 59 IN | WEIGHT: 123 LBS | BODY MASS INDEX: 24.8 KG/M2

## 2019-04-02 DIAGNOSIS — R92.8 ABNORMAL FINDINGS ON DIAGNOSTIC IMAGING OF BREAST: ICD-10-CM

## 2019-04-02 PROCEDURE — 76642 ULTRASOUND BREAST LIMITED: CPT

## 2019-04-02 PROCEDURE — G0279 TOMOSYNTHESIS, MAMMO: HCPCS

## 2019-04-02 PROCEDURE — 77065 DX MAMMO INCL CAD UNI: CPT

## 2019-04-13 DIAGNOSIS — I10 ESSENTIAL HYPERTENSION: ICD-10-CM

## 2019-04-15 RX ORDER — IRBESARTAN 300 MG/1
TABLET ORAL
Qty: 15 TABLET | Refills: 0 | Status: SHIPPED | OUTPATIENT
Start: 2019-04-15 | End: 2020-08-26 | Stop reason: ALTCHOICE

## 2019-04-17 ENCOUNTER — OFFICE VISIT (OUTPATIENT)
Dept: SURGERY | Facility: CLINIC | Age: 74
End: 2019-04-17
Payer: MEDICARE

## 2019-04-17 VITALS
HEART RATE: 72 BPM | SYSTOLIC BLOOD PRESSURE: 110 MMHG | TEMPERATURE: 97 F | DIASTOLIC BLOOD PRESSURE: 62 MMHG | WEIGHT: 129 LBS | RESPIRATION RATE: 16 BRPM | BODY MASS INDEX: 26 KG/M2 | HEIGHT: 59 IN

## 2019-04-17 DIAGNOSIS — Z98.890 HX OF BENIGN BREAST BIOPSY: Primary | ICD-10-CM

## 2019-04-17 PROCEDURE — 99213 OFFICE O/P EST LOW 20 MIN: CPT | Performed by: PHYSICIAN ASSISTANT

## 2019-05-11 DIAGNOSIS — M79.643 PAIN OF HAND, UNSPECIFIED LATERALITY: ICD-10-CM

## 2019-05-13 RX ORDER — TRAZODONE HYDROCHLORIDE 100 MG/1
TABLET ORAL
Qty: 90 TABLET | Refills: 1 | Status: SHIPPED | OUTPATIENT
Start: 2019-05-13 | End: 2019-08-10 | Stop reason: SDUPTHER

## 2019-05-27 DIAGNOSIS — M81.0 OSTEOPOROSIS, UNSPECIFIED OSTEOPOROSIS TYPE, UNSPECIFIED PATHOLOGICAL FRACTURE PRESENCE: ICD-10-CM

## 2019-05-28 RX ORDER — ALENDRONATE SODIUM 70 MG/1
TABLET ORAL
Qty: 4 TABLET | Refills: 5 | Status: SHIPPED | OUTPATIENT
Start: 2019-05-28 | End: 2019-12-28 | Stop reason: SDUPTHER

## 2019-06-11 ENCOUNTER — APPOINTMENT (OUTPATIENT)
Dept: LAB | Facility: CLINIC | Age: 74
End: 2019-06-11
Payer: MEDICARE

## 2019-06-11 ENCOUNTER — TRANSCRIBE ORDERS (OUTPATIENT)
Dept: LAB | Facility: CLINIC | Age: 74
End: 2019-06-11

## 2019-06-11 DIAGNOSIS — E11.9 TYPE 2 DIABETES MELLITUS WITHOUT COMPLICATION, WITHOUT LONG-TERM CURRENT USE OF INSULIN (HCC): ICD-10-CM

## 2019-06-11 LAB
EST. AVERAGE GLUCOSE BLD GHB EST-MCNC: 120 MG/DL
GLUCOSE P FAST SERPL-MCNC: 113 MG/DL (ref 65–99)
HBA1C MFR BLD: 5.8 % (ref 4.2–6.3)

## 2019-06-11 PROCEDURE — 83036 HEMOGLOBIN GLYCOSYLATED A1C: CPT

## 2019-06-11 PROCEDURE — 84681 ASSAY OF C-PEPTIDE: CPT

## 2019-06-11 PROCEDURE — 82947 ASSAY GLUCOSE BLOOD QUANT: CPT

## 2019-06-11 PROCEDURE — 36415 COLL VENOUS BLD VENIPUNCTURE: CPT

## 2019-06-12 LAB — C PEPTIDE SERPL-MCNC: 5.4 NG/ML (ref 1.1–4.4)

## 2019-06-18 ENCOUNTER — OFFICE VISIT (OUTPATIENT)
Dept: FAMILY MEDICINE CLINIC | Facility: CLINIC | Age: 74
End: 2019-06-18
Payer: MEDICARE

## 2019-06-18 VITALS
HEIGHT: 59 IN | BODY MASS INDEX: 25 KG/M2 | DIASTOLIC BLOOD PRESSURE: 74 MMHG | TEMPERATURE: 97.7 F | WEIGHT: 124 LBS | HEART RATE: 70 BPM | SYSTOLIC BLOOD PRESSURE: 126 MMHG | OXYGEN SATURATION: 98 %

## 2019-06-18 DIAGNOSIS — E11.9 TYPE 2 DIABETES MELLITUS WITHOUT COMPLICATION, WITHOUT LONG-TERM CURRENT USE OF INSULIN (HCC): Primary | ICD-10-CM

## 2019-06-18 PROCEDURE — 99213 OFFICE O/P EST LOW 20 MIN: CPT | Performed by: FAMILY MEDICINE

## 2019-07-17 DIAGNOSIS — E11.9 TYPE 2 DIABETES MELLITUS WITHOUT COMPLICATION, WITHOUT LONG-TERM CURRENT USE OF INSULIN (HCC): ICD-10-CM

## 2019-07-19 DIAGNOSIS — I10 ESSENTIAL HYPERTENSION: ICD-10-CM

## 2019-07-22 DIAGNOSIS — E78.00 HYPERCHOLESTEROLEMIA: ICD-10-CM

## 2019-07-25 RX ORDER — SIMVASTATIN 40 MG
40 TABLET ORAL DAILY
Qty: 90 TABLET | Refills: 2 | Status: SHIPPED | OUTPATIENT
Start: 2019-07-25 | End: 2020-06-29

## 2019-07-25 RX ORDER — IRBESARTAN 150 MG/1
TABLET ORAL
Qty: 90 TABLET | Refills: 3 | Status: SHIPPED | OUTPATIENT
Start: 2019-07-25 | End: 2020-06-30

## 2019-08-10 DIAGNOSIS — M79.643 PAIN OF HAND, UNSPECIFIED LATERALITY: ICD-10-CM

## 2019-08-20 RX ORDER — TRAZODONE HYDROCHLORIDE 100 MG/1
TABLET ORAL
Qty: 90 TABLET | Refills: 1 | Status: SHIPPED | OUTPATIENT
Start: 2019-08-20 | End: 2020-02-05

## 2019-08-23 PROBLEM — E11.9 TYPE 2 DIABETES MELLITUS WITHOUT COMPLICATION, WITHOUT LONG-TERM CURRENT USE OF INSULIN (HCC): Status: ACTIVE | Noted: 2019-08-23

## 2019-08-27 ENCOUNTER — APPOINTMENT (OUTPATIENT)
Dept: LAB | Facility: CLINIC | Age: 74
End: 2019-08-27
Payer: MEDICARE

## 2019-08-27 ENCOUNTER — TRANSCRIBE ORDERS (OUTPATIENT)
Dept: LAB | Facility: CLINIC | Age: 74
End: 2019-08-27

## 2019-08-27 ENCOUNTER — OFFICE VISIT (OUTPATIENT)
Dept: FAMILY MEDICINE CLINIC | Facility: CLINIC | Age: 74
End: 2019-08-27
Payer: MEDICARE

## 2019-08-27 VITALS — HEIGHT: 59 IN | BODY MASS INDEX: 25.8 KG/M2 | WEIGHT: 128 LBS

## 2019-08-27 DIAGNOSIS — R10.9 FLANK PAIN: ICD-10-CM

## 2019-08-27 DIAGNOSIS — Z11.59 NEED FOR HEPATITIS C SCREENING TEST: ICD-10-CM

## 2019-08-27 DIAGNOSIS — E11.9 ENCOUNTER FOR DIABETIC FOOT EXAM (HCC): ICD-10-CM

## 2019-08-27 DIAGNOSIS — N34.2 INFECTIVE URETHRITIS: Primary | ICD-10-CM

## 2019-08-27 LAB
BACTERIA UR QL AUTO: NORMAL /HPF
BILIRUB UR QL STRIP: NEGATIVE
CLARITY UR: CLEAR
COLOR UR: YELLOW
GLUCOSE UR STRIP-MCNC: NEGATIVE MG/DL
HGB UR QL STRIP.AUTO: NEGATIVE
HYALINE CASTS #/AREA URNS LPF: NORMAL /LPF
KETONES UR STRIP-MCNC: NEGATIVE MG/DL
LEUKOCYTE ESTERASE UR QL STRIP: ABNORMAL
NITRITE UR QL STRIP: NEGATIVE
NON-SQ EPI CELLS URNS QL MICRO: NORMAL /HPF
PH UR STRIP.AUTO: 6 [PH]
PROT UR STRIP-MCNC: NEGATIVE MG/DL
RBC #/AREA URNS AUTO: NORMAL /HPF
SP GR UR STRIP.AUTO: 1.01 (ref 1–1.03)
UROBILINOGEN UR QL STRIP.AUTO: 0.2 E.U./DL
WBC #/AREA URNS AUTO: NORMAL /HPF

## 2019-08-27 PROCEDURE — 81001 URINALYSIS AUTO W/SCOPE: CPT | Performed by: FAMILY MEDICINE

## 2019-08-27 PROCEDURE — 99214 OFFICE O/P EST MOD 30 MIN: CPT | Performed by: FAMILY MEDICINE

## 2019-08-27 PROCEDURE — 36415 COLL VENOUS BLD VENIPUNCTURE: CPT

## 2019-08-27 PROCEDURE — 86803 HEPATITIS C AB TEST: CPT

## 2019-08-27 PROCEDURE — 1124F ACP DISCUSS-NO DSCNMKR DOCD: CPT | Performed by: FAMILY MEDICINE

## 2019-08-27 RX ORDER — SULFAMETHOXAZOLE AND TRIMETHOPRIM 800; 160 MG/1; MG/1
1 TABLET ORAL EVERY 12 HOURS SCHEDULED
Qty: 10 TABLET | Refills: 0 | Status: SHIPPED | OUTPATIENT
Start: 2019-08-27 | End: 2019-09-01

## 2019-08-27 RX ORDER — IBUPROFEN 600 MG/1
600 TABLET ORAL EVERY 8 HOURS PRN
Qty: 30 TABLET | Refills: 2 | Status: SHIPPED | OUTPATIENT
Start: 2019-08-27 | End: 2021-04-29 | Stop reason: ALTCHOICE

## 2019-08-27 NOTE — PROGRESS NOTES
Assessment/Plan:    No problem-specific Assessment & Plan notes found for this encounter  Diagnoses and all orders for this visit:    Infective urethritis  -     sulfamethoxazole-trimethoprim (BACTRIM DS) 800-160 mg per tablet; Take 1 tablet by mouth every 12 (twelve) hours for 5 days  -     UA w Reflex to Microscopic w Reflex to Culture    Flank pain  -     ibuprofen (MOTRIN) 600 mg tablet; Take 1 tablet (600 mg total) by mouth every 8 (eight) hours as needed for mild pain or moderate pain    Need for hepatitis C screening test  -     Hepatitis C antibody; Future    Encounter for diabetic foot exam ([facility] )  -     Diabetic foot exam; Future    Other orders  -     Cancel: POCT urine dip          PHQ-9 Depression Screening    PHQ-9:    Frequency of the following problems over the past two weeks:       Little interest or pleasure in doing things:  0 - not at all  Feeling down, depressed, or hopeless:  0 - not at all  PHQ-2 Score:  0            Subjective:      Patient ID: Ibis Valadez is a 76 y o  female  Pt complains of pain in the right flank that started 3 days ago and it is slowly getting worse    Urinary Tract Infection    This is a new problem  The current episode started in the past 7 days  The patient is experiencing no pain  Pertinent negatives include no chills  She has tried nothing for the symptoms  The treatment provided no relief  The following portions of the patient's history were reviewed and updated as appropriate: allergies, current medications, past family history, past medical history, past social history, past surgical history and problem list     Review of Systems   Constitutional: Negative for chills and fever  Gastrointestinal: Negative for abdominal pain  Genitourinary: Negative for difficulty urinating and dysuria           Objective:    Ht 4' 11" (1 499 m)   Wt 58 1 kg (128 lb)   BMI 25 85 kg/m²      Physical Exam   Constitutional: She is oriented to person, place, and time  She appears well-developed and well-nourished  No distress  HENT:   Head: Normocephalic and atraumatic  Eyes: Pupils are equal, round, and reactive to light  Conjunctivae and EOM are normal  No scleral icterus  Neck: Normal range of motion  Neck supple  Cardiovascular: Normal rate, regular rhythm and normal heart sounds  Pulses are no weak pulses  No murmur heard  Pulses:       Dorsalis pedis pulses are 2+ on the right side, and 2+ on the left side  Pulmonary/Chest: Effort normal and breath sounds normal  No respiratory distress  She has no wheezes  She has no rales  Abdominal: Soft  Bowel sounds are normal  She exhibits no distension and no mass  There is no tenderness  There is no rebound and no guarding  Musculoskeletal: She exhibits no edema  Feet:   Right Foot:   Skin Integrity: Negative for ulcer, skin breakdown, erythema, warmth, callus or dry skin  Left Foot:   Skin Integrity: Negative for ulcer, skin breakdown, erythema, warmth, callus or dry skin  Lymphadenopathy:     She has no cervical adenopathy  Neurological: She is alert and oriented to person, place, and time  Skin: Skin is warm and dry  She is not diaphoretic  Psychiatric: She has a normal mood and affect  Her behavior is normal  Judgment and thought content normal    Nursing note and vitals reviewed  Patient's shoes and socks removed  Right Foot/Ankle   Right Foot Inspection  Skin Exam: skin normal and skin intact no dry skin, no warmth, no callus, no erythema, no maceration, no abnormal color, no pre-ulcer, no ulcer and no callus                          Toe Exam: ROM and strength within normal limits  Sensory       Monofilament testing: intact  Vascular  Capillary refills: < 3 seconds  The right DP pulse is 2+       Left Foot/Ankle  Left Foot Inspection  Skin Exam: skin normal and skin intactno dry skin, no warmth, no erythema, no maceration, normal color, no pre-ulcer, no ulcer and no callus Toe Exam: ROM and strength within normal limits                   Sensory       Monofilament: intact  Vascular  Capillary refills: < 3 seconds  The left DP pulse is 2+  Assign Risk Category:  No deformity present; No loss of protective sensation;  No weak pulses       Risk: 0

## 2019-08-28 LAB — HCV AB SER QL: NORMAL

## 2019-09-03 DIAGNOSIS — R10.9 FLANK PAIN: Primary | ICD-10-CM

## 2019-09-03 DIAGNOSIS — F32.A DEPRESSION, UNSPECIFIED DEPRESSION TYPE: ICD-10-CM

## 2019-09-03 DIAGNOSIS — R35.0 URINARY FREQUENCY: ICD-10-CM

## 2019-09-03 RX ORDER — PHENAZOPYRIDINE HYDROCHLORIDE 200 MG/1
200 TABLET, FILM COATED ORAL
Qty: 30 TABLET | Refills: 0 | Status: SHIPPED | OUTPATIENT
Start: 2019-09-03 | End: 2020-08-26 | Stop reason: ALTCHOICE

## 2019-09-03 RX ORDER — SACCHAROMYCES BOULARDII 250 MG
250 CAPSULE ORAL 2 TIMES DAILY
Qty: 60 CAPSULE | Refills: 3 | Status: SHIPPED | OUTPATIENT
Start: 2019-09-03 | End: 2020-08-26 | Stop reason: ALTCHOICE

## 2019-09-03 RX ORDER — BUPROPION HYDROCHLORIDE 150 MG/1
TABLET ORAL
Qty: 30 TABLET | Refills: 5 | Status: SHIPPED | OUTPATIENT
Start: 2019-09-03 | End: 2020-02-05

## 2019-09-07 ENCOUNTER — HOSPITAL ENCOUNTER (OUTPATIENT)
Dept: ULTRASOUND IMAGING | Facility: HOSPITAL | Age: 74
Discharge: HOME | End: 2019-09-07
Payer: MEDICARE

## 2019-09-07 DIAGNOSIS — R10.9 FLANK PAIN: ICD-10-CM

## 2019-09-07 DIAGNOSIS — R35.0 URINARY FREQUENCY: ICD-10-CM

## 2019-09-07 PROCEDURE — 76770 US EXAM ABDO BACK WALL COMP: CPT

## 2019-09-10 ENCOUNTER — APPOINTMENT (OUTPATIENT)
Dept: LAB | Facility: CLINIC | Age: 74
End: 2019-09-10
Payer: MEDICARE

## 2019-09-10 ENCOUNTER — TRANSCRIBE ORDERS (OUTPATIENT)
Dept: LAB | Facility: CLINIC | Age: 74
End: 2019-09-10

## 2019-09-10 DIAGNOSIS — E11.9 TYPE 2 DIABETES MELLITUS WITHOUT COMPLICATION, WITHOUT LONG-TERM CURRENT USE OF INSULIN (HCC): ICD-10-CM

## 2019-09-10 LAB
EST. AVERAGE GLUCOSE BLD GHB EST-MCNC: 123 MG/DL
GLUCOSE P FAST SERPL-MCNC: 116 MG/DL (ref 65–99)
HBA1C MFR BLD: 5.9 % (ref 4.2–6.3)

## 2019-09-10 PROCEDURE — 84681 ASSAY OF C-PEPTIDE: CPT

## 2019-09-10 PROCEDURE — 82947 ASSAY GLUCOSE BLOOD QUANT: CPT

## 2019-09-10 PROCEDURE — 36415 COLL VENOUS BLD VENIPUNCTURE: CPT

## 2019-09-10 PROCEDURE — 83036 HEMOGLOBIN GLYCOSYLATED A1C: CPT

## 2019-09-11 LAB — C PEPTIDE SERPL-MCNC: 5.2 NG/ML (ref 1.1–4.4)

## 2019-09-18 ENCOUNTER — OFFICE VISIT (OUTPATIENT)
Dept: FAMILY MEDICINE CLINIC | Facility: CLINIC | Age: 74
End: 2019-09-18
Payer: MEDICARE

## 2019-09-18 ENCOUNTER — TRANSCRIBE ORDERS (OUTPATIENT)
Dept: RADIOLOGY | Facility: CLINIC | Age: 74
End: 2019-09-18

## 2019-09-18 ENCOUNTER — APPOINTMENT (OUTPATIENT)
Dept: RADIOLOGY | Facility: CLINIC | Age: 74
End: 2019-09-18
Payer: MEDICARE

## 2019-09-18 VITALS
OXYGEN SATURATION: 99 % | DIASTOLIC BLOOD PRESSURE: 68 MMHG | BODY MASS INDEX: 26.21 KG/M2 | SYSTOLIC BLOOD PRESSURE: 118 MMHG | WEIGHT: 130 LBS | HEART RATE: 59 BPM | HEIGHT: 59 IN | TEMPERATURE: 97.1 F

## 2019-09-18 DIAGNOSIS — R10.9 FLANK PAIN: ICD-10-CM

## 2019-09-18 DIAGNOSIS — E11.9 TYPE 2 DIABETES MELLITUS WITHOUT COMPLICATION, WITHOUT LONG-TERM CURRENT USE OF INSULIN (HCC): Primary | ICD-10-CM

## 2019-09-18 DIAGNOSIS — R10.9 FLANK PAIN: Primary | ICD-10-CM

## 2019-09-18 DIAGNOSIS — I10 ESSENTIAL HYPERTENSION: ICD-10-CM

## 2019-09-18 PROCEDURE — 99214 OFFICE O/P EST MOD 30 MIN: CPT | Performed by: FAMILY MEDICINE

## 2019-09-18 PROCEDURE — 71101 X-RAY EXAM UNILAT RIBS/CHEST: CPT

## 2019-09-18 NOTE — PROGRESS NOTES
Assessment/Plan:    No problem-specific Assessment & Plan notes found for this encounter  Diagnoses and all orders for this visit:    Type 2 diabetes mellitus without complication, without long-term current use of insulin ([facility])  -     Glucose, fasting; Future  -     Hemoglobin A1C; Future    Essential hypertension  Comments:  no change in the medication  Orders:  -     TSH, 3rd generation with Free T4 reflex; Future  -     Lipid panel; Future  -     CBC and differential; Future  -     Comprehensive metabolic panel; Future    Flank pain  -     XR ribs 2 vw right; Future          PHQ-9 Depression Screening    PHQ-9:    Frequency of the following problems over the past two weeks:       Little interest or pleasure in doing things:  0 - not at all  Feeling down, depressed, or hopeless:  0 - not at all  PHQ-2 Score:  0            Subjective:      Patient ID: Darius Barraza is a 76 y o  female  Follow up for HTN which is well controlled    Diabetes   She presents for her follow-up diabetic visit  She has type 2 diabetes mellitus  Her disease course has been stable  There are no hypoglycemic associated symptoms  There are no diabetic associated symptoms  Pertinent negatives for diabetes include no chest pain  There are no hypoglycemic complications  Symptoms are stable  There are no diabetic complications  Risk factors for coronary artery disease include sedentary lifestyle and post-menopausal  Current diabetic treatment includes oral agent (monotherapy)  She is compliant with treatment most of the time  Her weight is stable  She is following a diabetic diet  There is no change in her home blood glucose trend  Her overall blood glucose range is 110-130 mg/dl  An ACE inhibitor/angiotensin II receptor blocker is being taken  Back Pain   This is a chronic problem  The current episode started more than 1 month ago  The problem occurs intermittently  The problem has been waxing and waning since onset   Pain location: right flank  The pain is at a severity of 8/10  Pertinent negatives include no abdominal pain, chest pain or numbness  The following portions of the patient's history were reviewed and updated as appropriate: allergies, current medications, past family history, past medical history, past social history, past surgical history and problem list     Review of Systems   Eyes: Negative for visual disturbance  Cardiovascular: Negative for chest pain and palpitations  Gastrointestinal: Negative for abdominal pain, nausea and vomiting  Genitourinary: Negative for frequency  Musculoskeletal: Positive for back pain  Skin: Negative for wound  Neurological: Negative for numbness  Objective:    /68   Pulse 59   Temp (!) 97 1 °F (36 2 °C) (Tympanic)   Ht 4' 11" (1 499 m)   Wt 59 kg (130 lb)   SpO2 99%   BMI 26 26 kg/m²      Physical Exam   Constitutional: She is oriented to person, place, and time  She appears well-developed and well-nourished  No distress  HENT:   Head: Normocephalic and atraumatic  Eyes: Pupils are equal, round, and reactive to light  Conjunctivae and EOM are normal  No scleral icterus  Neck: Normal range of motion  Neck supple  Cardiovascular: Normal rate, regular rhythm and normal heart sounds  No murmur heard  Pulmonary/Chest: Effort normal and breath sounds normal  No respiratory distress  She has no wheezes  She has no rales  Abdominal: Soft  Bowel sounds are normal  She exhibits no distension and no mass  There is no tenderness  There is no rebound and no guarding  Musculoskeletal: Normal range of motion  She exhibits no edema or deformity  Lymphadenopathy:     She has no cervical adenopathy  Neurological: She is alert and oriented to person, place, and time  Skin: Skin is warm and dry  She is not diaphoretic  Psychiatric: She has a normal mood and affect   Her behavior is normal  Judgment and thought content normal    Nursing note and vitals reviewed

## 2019-10-03 ENCOUNTER — TRANSCRIBE ORDERS (OUTPATIENT)
Dept: ADMINISTRATIVE | Facility: HOSPITAL | Age: 74
End: 2019-10-03

## 2019-10-03 DIAGNOSIS — Z12.31 SCREENING MAMMOGRAM FOR HIGH-RISK PATIENT: Primary | ICD-10-CM

## 2019-10-10 ENCOUNTER — HOSPITAL ENCOUNTER (OUTPATIENT)
Dept: MAMMOGRAPHY | Facility: HOSPITAL | Age: 74
Discharge: HOME | End: 2019-10-10
Attending: SPECIALIST
Payer: MEDICARE

## 2019-10-10 DIAGNOSIS — Z12.31 SCREENING MAMMOGRAM FOR HIGH-RISK PATIENT: ICD-10-CM

## 2019-10-10 PROCEDURE — 77067 SCR MAMMO BI INCL CAD: CPT

## 2019-10-10 PROCEDURE — 77063 BREAST TOMOSYNTHESIS BI: CPT

## 2019-10-24 ENCOUNTER — IMMUNIZATIONS (OUTPATIENT)
Dept: FAMILY MEDICINE CLINIC | Facility: CLINIC | Age: 74
End: 2019-10-24
Payer: MEDICARE

## 2019-10-24 DIAGNOSIS — Z23 ENCOUNTER FOR IMMUNIZATION: ICD-10-CM

## 2019-10-24 PROCEDURE — G0008 ADMIN INFLUENZA VIRUS VAC: HCPCS

## 2019-10-24 PROCEDURE — 90662 IIV NO PRSV INCREASED AG IM: CPT

## 2019-12-12 ENCOUNTER — APPOINTMENT (OUTPATIENT)
Dept: LAB | Facility: CLINIC | Age: 74
End: 2019-12-12
Payer: MEDICARE

## 2019-12-12 DIAGNOSIS — I10 ESSENTIAL HYPERTENSION: ICD-10-CM

## 2019-12-12 DIAGNOSIS — E11.9 TYPE 2 DIABETES MELLITUS WITHOUT COMPLICATION, WITHOUT LONG-TERM CURRENT USE OF INSULIN (HCC): ICD-10-CM

## 2019-12-12 LAB
ALBUMIN SERPL BCP-MCNC: 3.3 G/DL (ref 3.5–5)
ALP SERPL-CCNC: 55 U/L (ref 46–116)
ALT SERPL W P-5'-P-CCNC: 29 U/L (ref 12–78)
ANION GAP SERPL CALCULATED.3IONS-SCNC: 4 MMOL/L (ref 4–13)
AST SERPL W P-5'-P-CCNC: 13 U/L (ref 5–45)
BASOPHILS # BLD AUTO: 0.05 THOUSANDS/ΜL (ref 0–0.1)
BASOPHILS NFR BLD AUTO: 1 % (ref 0–1)
BILIRUB SERPL-MCNC: 0.66 MG/DL (ref 0.2–1)
BUN SERPL-MCNC: 16 MG/DL (ref 5–25)
CALCIUM SERPL-MCNC: 8.9 MG/DL (ref 8.3–10.1)
CHLORIDE SERPL-SCNC: 108 MMOL/L (ref 100–108)
CHOLEST SERPL-MCNC: 107 MG/DL (ref 50–200)
CO2 SERPL-SCNC: 28 MMOL/L (ref 21–32)
CREAT SERPL-MCNC: 0.86 MG/DL (ref 0.6–1.3)
EOSINOPHIL # BLD AUTO: 0.43 THOUSAND/ΜL (ref 0–0.61)
EOSINOPHIL NFR BLD AUTO: 8 % (ref 0–6)
ERYTHROCYTE [DISTWIDTH] IN BLOOD BY AUTOMATED COUNT: 13.3 % (ref 11.6–15.1)
EST. AVERAGE GLUCOSE BLD GHB EST-MCNC: 120 MG/DL
GFR SERPL CREATININE-BSD FRML MDRD: 67 ML/MIN/1.73SQ M
GLUCOSE P FAST SERPL-MCNC: 120 MG/DL (ref 65–99)
HBA1C MFR BLD: 5.8 % (ref 4.2–6.3)
HCT VFR BLD AUTO: 43 % (ref 34.8–46.1)
HDLC SERPL-MCNC: 46 MG/DL
HGB BLD-MCNC: 13.7 G/DL (ref 11.5–15.4)
IMM GRANULOCYTES # BLD AUTO: 0.02 THOUSAND/UL (ref 0–0.2)
IMM GRANULOCYTES NFR BLD AUTO: 0 % (ref 0–2)
LDLC SERPL CALC-MCNC: 33 MG/DL (ref 0–100)
LYMPHOCYTES # BLD AUTO: 1.43 THOUSANDS/ΜL (ref 0.6–4.47)
LYMPHOCYTES NFR BLD AUTO: 26 % (ref 14–44)
MCH RBC QN AUTO: 30.5 PG (ref 26.8–34.3)
MCHC RBC AUTO-ENTMCNC: 31.9 G/DL (ref 31.4–37.4)
MCV RBC AUTO: 96 FL (ref 82–98)
MONOCYTES # BLD AUTO: 0.66 THOUSAND/ΜL (ref 0.17–1.22)
MONOCYTES NFR BLD AUTO: 12 % (ref 4–12)
NEUTROPHILS # BLD AUTO: 3 THOUSANDS/ΜL (ref 1.85–7.62)
NEUTS SEG NFR BLD AUTO: 53 % (ref 43–75)
NONHDLC SERPL-MCNC: 61 MG/DL
NRBC BLD AUTO-RTO: 0 /100 WBCS
PLATELET # BLD AUTO: 199 THOUSANDS/UL (ref 149–390)
PMV BLD AUTO: 9.9 FL (ref 8.9–12.7)
POTASSIUM SERPL-SCNC: 4.1 MMOL/L (ref 3.5–5.3)
PROT SERPL-MCNC: 6.6 G/DL (ref 6.4–8.2)
RBC # BLD AUTO: 4.49 MILLION/UL (ref 3.81–5.12)
SODIUM SERPL-SCNC: 140 MMOL/L (ref 136–145)
TRIGL SERPL-MCNC: 138 MG/DL
TSH SERPL DL<=0.05 MIU/L-ACNC: 2.43 UIU/ML (ref 0.36–3.74)
WBC # BLD AUTO: 5.59 THOUSAND/UL (ref 4.31–10.16)

## 2019-12-12 PROCEDURE — 80061 LIPID PANEL: CPT

## 2019-12-12 PROCEDURE — 85025 COMPLETE CBC W/AUTO DIFF WBC: CPT

## 2019-12-12 PROCEDURE — 83036 HEMOGLOBIN GLYCOSYLATED A1C: CPT

## 2019-12-12 PROCEDURE — 36415 COLL VENOUS BLD VENIPUNCTURE: CPT

## 2019-12-12 PROCEDURE — 84443 ASSAY THYROID STIM HORMONE: CPT

## 2019-12-12 PROCEDURE — 80053 COMPREHEN METABOLIC PANEL: CPT

## 2019-12-18 ENCOUNTER — OFFICE VISIT (OUTPATIENT)
Dept: FAMILY MEDICINE CLINIC | Facility: CLINIC | Age: 74
End: 2019-12-18
Payer: MEDICARE

## 2019-12-18 VITALS
HEART RATE: 70 BPM | HEIGHT: 59 IN | SYSTOLIC BLOOD PRESSURE: 128 MMHG | DIASTOLIC BLOOD PRESSURE: 80 MMHG | WEIGHT: 128.6 LBS | OXYGEN SATURATION: 97 % | TEMPERATURE: 98.4 F | BODY MASS INDEX: 25.92 KG/M2

## 2019-12-18 DIAGNOSIS — E78.00 HYPERCHOLESTEROLEMIA: ICD-10-CM

## 2019-12-18 DIAGNOSIS — Z00.00 MEDICARE ANNUAL WELLNESS VISIT, SUBSEQUENT: Primary | ICD-10-CM

## 2019-12-18 DIAGNOSIS — I10 ESSENTIAL HYPERTENSION: ICD-10-CM

## 2019-12-18 DIAGNOSIS — E11.9 TYPE 2 DIABETES MELLITUS WITHOUT COMPLICATION, WITHOUT LONG-TERM CURRENT USE OF INSULIN (HCC): ICD-10-CM

## 2019-12-18 PROCEDURE — G0439 PPPS, SUBSEQ VISIT: HCPCS | Performed by: FAMILY MEDICINE

## 2019-12-18 PROCEDURE — 99214 OFFICE O/P EST MOD 30 MIN: CPT | Performed by: FAMILY MEDICINE

## 2019-12-18 NOTE — PROGRESS NOTES
Assessment/Plan:    No problem-specific Assessment & Plan notes found for this encounter  Diagnoses and all orders for this visit:    Medicare annual wellness visit, subsequent    Type 2 diabetes mellitus without complication, without long-term current use of insulin ([facility] )    Essential hypertension    Hypercholesterolemia          PHQ-9 Depression Screening    PHQ-9:    Frequency of the following problems over the past two weeks:       Little interest or pleasure in doing things:  0 - not at all  Feeling down, depressed, or hopeless:  0 - not at all  PHQ-2 Score:  0            Subjective:      Patient ID: Ebony Reardon is a 76 y o  female  Follow up for cholesterol and HTN    Diabetes   She presents for her follow-up diabetic visit  She has type 2 diabetes mellitus  Her disease course has been stable  Pertinent negatives for hypoglycemia include no seizures  Pertinent negatives for diabetes include no chest pain and no fatigue  There are no hypoglycemic complications  Symptoms are stable  There are no diabetic complications  Risk factors for coronary artery disease include sedentary lifestyle and post-menopausal  Current diabetic treatment includes oral agent (monotherapy)  She is compliant with treatment most of the time  She is following a diabetic diet  She participates in exercise intermittently  There is no change in her home blood glucose trend  Her overall blood glucose range is 110-130 mg/dl  An ACE inhibitor/angiotensin II receptor blocker is being taken  The following portions of the patient's history were reviewed and updated as appropriate: allergies, current medications, past family history, past medical history, past social history, past surgical history and problem list     Review of Systems   Constitutional: Negative for chills, fatigue and fever  HENT: Negative  Eyes: Negative  Respiratory: Negative for shortness of breath and wheezing      Cardiovascular: Negative for chest pain and palpitations  Gastrointestinal: Negative for abdominal pain, blood in stool, constipation, diarrhea, nausea and vomiting  Endocrine: Negative  Genitourinary: Negative for difficulty urinating and dysuria  Musculoskeletal: Negative for arthralgias and myalgias  Skin: Negative  Allergic/Immunologic: Negative  Neurological: Negative for seizures and syncope  Hematological: Negative for adenopathy  Psychiatric/Behavioral: Negative  Objective:    /80 (BP Location: Left arm, Patient Position: Sitting, Cuff Size: Adult)   Pulse 70   Temp 98 4 °F (36 9 °C) (Tympanic)   Ht 4' 11" (1 499 m)   Wt 58 3 kg (128 lb 9 6 oz)   SpO2 97%   BMI 25 97 kg/m²      Physical Exam   Constitutional: She is oriented to person, place, and time  She appears well-developed and well-nourished  HENT:   Head: Normocephalic and atraumatic  Right Ear: External ear normal    Left Ear: External ear normal    Nose: Nose normal    Mouth/Throat: Oropharynx is clear and moist    Eyes: Pupils are equal, round, and reactive to light  Conjunctivae and EOM are normal    Neck: Normal range of motion  Neck supple  Cardiovascular: Normal rate, regular rhythm and normal heart sounds  No murmur heard  Pulmonary/Chest: Effort normal and breath sounds normal  No respiratory distress  She has no wheezes  She has no rales  Abdominal: Soft  Bowel sounds are normal  She exhibits no distension and no mass  There is no tenderness  There is no rebound and no guarding  Musculoskeletal: Normal range of motion  She exhibits no edema  Lymphadenopathy:     She has no cervical adenopathy  Neurological: She is alert and oriented to person, place, and time  She has normal reflexes  She exhibits normal muscle tone  Skin: Skin is warm and dry  Psychiatric: She has a normal mood and affect  Her behavior is normal  Judgment and thought content normal    Nursing note and vitals reviewed

## 2019-12-18 NOTE — PROGRESS NOTES
Assessment and Plan:     Problem List Items Addressed This Visit     None      Visit Diagnoses     Medicare annual wellness visit, subsequent    -  Primary           Preventive health issues were discussed with patient, and age appropriate screening tests were ordered as noted in patient's After Visit Summary  Personalized health advice and appropriate referrals for health education or preventive services given if needed, as noted in patient's After Visit Summary  History of Present Illness:     Patient presents for Medicare Annual Wellness visit    Patient Care Team:  Anil Barnes DO as PCP - General  Anil Barnes DO     Problem List:     Patient Active Problem List   Diagnosis    Type 2 diabetes mellitus without complication, without long-term current use of insulin [facility])      Past Medical and Surgical History:     Past Medical History:   Diagnosis Date    Diabetes ([facility] [address] )     Hx of benign breast biopsy 10/31/2018    Left breast mammogram and ultrasound 10/10/2018      Hyperlipidemia      Past Surgical History:   Procedure Laterality Date    BREAST BIOPSY Left 1990    benign per patient report    BREAST BIOPSY Left 11/13/2018    benign   NO CLIP    BREAST SURGERY Left 1990    biopsy on left breast    CHOLECYSTECTOMY      HERNIA REPAIR      HYSTERECTOMY  04/15/2016    US GUIDED BREAST BIOPSY LEFT COMPLETE Left 11/13/2018      Family History:     Family History   Problem Relation Age of Onset    Heart failure Mother     Diabetes Father     No Known Problems Maternal Grandmother     No Known Problems Maternal Grandfather     No Known Problems Paternal Grandmother     No Known Problems Paternal Grandfather     No Known Problems Maternal Aunt     No Known Problems Maternal Aunt     No Known Problems Paternal Aunt     No Known Problems Paternal Aunt     No Known Problems Paternal Aunt     No Known Problems Paternal Aunt       Social History:     Social History     Socioeconomic History  Marital status:       Spouse name: None    Number of children: 2    Years of education: None    Highest education level: None   Occupational History    Occupation: retired   Social Needs    Financial resource strain: None    Food insecurity:     Worry: None     Inability: None    Transportation needs:     Medical: None     Non-medical: None   Tobacco Use    Smoking status: Never Smoker    Smokeless tobacco: Never Used   Substance and Sexual Activity    Alcohol use: No    Drug use: No    Sexual activity: None   Lifestyle    Physical activity:     Days per week: None     Minutes per session: None    Stress: None   Relationships    Social connections:     Talks on phone: None     Gets together: None     Attends Muslim service: None     Active member of club or organization: None     Attends meetings of clubs or organizations: None     Relationship status: None    Intimate partner violence:     Fear of current or ex partner: None     Emotionally abused: None     Physically abused: None     Forced sexual activity: None   Other Topics Concern    None   Social History Narrative    None       Medications and Allergies:     Current Outpatient Medications   Medication Sig Dispense Refill    alendronate (FOSAMAX) 70 mg tablet TAKE 1 TABLET BY MOUTH ONCE A WEEK 4 tablet 5    buPROPion (WELLBUTRIN XL) 150 mg 24 hr tablet TAKE 1 TABLET BY MOUTH ONCE DAILY 30 tablet 5    glucose blood (ONE TOUCH ULTRA TEST) test strip Test one time daily 100 each 2    irbesartan (AVAPRO) 150 mg tablet TAKE 1 TABLET BY MOUTH ONCE DAILY 90 tablet 3    metFORMIN (GLUCOPHAGE) 500 mg tablet Take 1 tablet (500 mg total) by mouth daily 180 tablet 1    metoprolol tartrate (LOPRESSOR) 25 mg tablet TAKE ONE TABLET BY MOUTH ONCE DAILY 90 tablet 3    ONE TOUCH LANCETS MISC by Does not apply route daily Please test sugars daily 200 each 5    simvastatin (ZOCOR) 40 mg tablet Take 1 tablet (40 mg total) by mouth daily 90 tablet 2    traZODone (DESYREL) 100 mg tablet TAKE 1 TABLET BY MOUTH ONCE DAILY AT BEDTIME 90 tablet 1    ibuprofen (MOTRIN) 600 mg tablet Take 1 tablet (600 mg total) by mouth every 8 (eight) hours as needed for mild pain or moderate pain (Patient not taking: Reported on 12/18/2019) 30 tablet 2    irbesartan (AVAPRO) 300 mg tablet TAKE 1/2 (ONE-HALF) TABLET BY MOUTH ONCE DAILY (Patient not taking: Reported on 9/18/2019) 15 tablet 0    ofloxacin (OCUFLOX) 0 3 % ophthalmic solution       phenazopyridine (PYRIDIUM) 200 mg tablet Take 1 tablet (200 mg total) by mouth 3 (three) times a day with meals (Patient not taking: Reported on 9/18/2019) 30 tablet 0    prednisoLONE acetate (PRED FORTE) 1 % ophthalmic suspension       saccharomyces boulardii (FLORASTOR) 250 mg capsule Take 1 capsule (250 mg total) by mouth 2 (two) times a day (Patient not taking: Reported on 9/18/2019) 60 capsule 3     No current facility-administered medications for this visit        No Known Allergies   Immunizations:     Immunization History   Administered Date(s) Administered    INFLUENZA 09/25/2013, 09/07/2018    Influenza Split High Dose Preservative Free IM 11/09/2017    Influenza TIV (IM) 10/20/2010, 01/20/2012, 09/18/2012, 10/28/2014, 09/16/2016    Influenza, high dose seasonal 0 5 mL 09/07/2018, 10/24/2019    Pneumococcal Conjugate 13-Valent 11/09/2017    Pneumococcal Polysaccharide PPV23 08/23/2010      Health Maintenance:         Topic Date Due    MAMMOGRAM  10/10/2021    CRC Screening: Colonoscopy  04/30/2023    Hepatitis C Screening  Completed         Topic Date Due    DTaP,Tdap,and Td Vaccines (1 - Tdap) 03/30/1956    Hepatitis B Vaccine (1 of 3 - Risk 3-dose series) 03/30/1964      Medicare Health Risk Assessment:     /80 (BP Location: Left arm, Patient Position: Sitting, Cuff Size: Adult)   Pulse 70   Temp 98 4 °F (36 9 °C) (Tympanic)   Ht 4' 11" (1 499 m)   Wt 58 3 kg (128 lb 9 6 oz)   SpO2 97%   BMI 25 97 kg/m²      Ale Anthony is here for her Subsequent Wellness visit  Last Medicare Wellness visit information reviewed, patient interviewed and updates made to the record today  Health Risk Assessment:   Patient rates overall health as good  Patient feels that their physical health rating is same  Eyesight was rated as same  Hearing was rated as same  Patient feels that their emotional and mental health rating is same  Pain experienced in the last 7 days has been a lot  Patient's pain rating has been 9/10  Patient states that she has experienced no weight loss or gain in last 6 months  Depression Screening:   PHQ-2 Score: 0      Fall Risk Screening: In the past year, patient has experienced: no history of falling in past year      Urinary Incontinence Screening:   Patient has not leaked urine accidently in the last six months  Home Safety:  Patient does not have trouble with stairs inside or outside of their home  Patient has working smoke alarms and has working carbon monoxide detector  Home safety hazards include: none  Nutrition:   Current diet is Regular  Medications:   Patient is not currently taking any over-the-counter supplements  Patient is able to manage medications  Activities of Daily Living (ADLs)/Instrumental Activities of Daily Living (IADLs):   Walk and transfer into and out of bed and chair?: Yes  Dress and groom yourself?: Yes    Bathe or shower yourself?: Yes    Feed yourself? Yes  Do your laundry/housekeeping?: Yes  Manage your money, pay your bills and track your expenses?: Yes  Make your own meals?: Yes    Do your own shopping?: Yes    Previous Hospitalizations:   Any hospitalizations or ED visits within the last 12 months?: No      Advance Care Planning:   Living will: Yes    Durable POA for healthcare:  Yes    Advanced directive: Yes    Advanced directive counseling given: No    Five wishes given: No    End of Life Decisions reviewed with patient: Yes    Provider agrees with end of life decisions: Yes      Cognitive Screening:   Provider or family/friend/caregiver concerned regarding cognition?: No    PREVENTIVE SCREENINGS      Cardiovascular Screening:    General: Screening Not Indicated and History Lipid Disorder      Diabetes Screening:     General: Screening Not Indicated and History Diabetes      Colorectal Cancer Screening:     General: Screening Current      Breast Cancer Screening:     General: Screening Current      Cervical Cancer Screening:    General: Screening Not Indicated      Osteoporosis Screening:    General: Screening Not Indicated and History Osteoporosis      Abdominal Aortic Aneurysm (AAA) Screening:        General: Screening Not Indicated      Lung Cancer Screening:     General: Screening Not Indicated      Hepatitis C Screening:    General: Screening Current      Natan Del Rio DO

## 2019-12-18 NOTE — PATIENT INSTRUCTIONS
Medicare Preventive Visit Patient Instructions  Thank you for completing your Welcome to Medicare Visit or Medicare Annual Wellness Visit today  Your next wellness visit will be due in one year (12/18/2020)  The screening/preventive services that you may require over the next 5-10 years are detailed below  Some tests may not apply to you based off risk factors and/or age  Screening tests ordered at today's visit but not completed yet may show as past due  Also, please note that scanned in results may not display below  Preventive Screenings:  Service Recommendations Previous Testing/Comments   Colorectal Cancer Screening  * Colonoscopy    * Fecal Occult Blood Test (FOBT)/Fecal Immunochemical Test (FIT)  * Fecal DNA/Cologuard Test  * Flexible Sigmoidoscopy Age: 54-65 years old   Colonoscopy: every 10 years (may be performed more frequently if at higher risk)  OR  FOBT/FIT: every 1 year  OR  Cologuard: every 3 years  OR  Sigmoidoscopy: every 5 years  Screening may be recommended earlier than age 48 if at higher risk for colorectal cancer  Also, an individualized decision between you and your healthcare provider will decide whether screening between the ages of 74-80 would be appropriate  Colonoscopy: 04/30/2013  FOBT/FIT: Not on file  Cologuard: Not on file  Sigmoidoscopy: Not on file    Screening Current     Breast Cancer Screening Age: 36 years old  Frequency: every 1-2 years  Not required if history of left and right mastectomy Mammogram: 10/10/2019    Screening Current   Cervical Cancer Screening Between the ages of 21-29, pap smear recommended once every 3 years  Between the ages of 33-67, can perform pap smear with HPV co-testing every 5 years     Recommendations may differ for women with a history of total hysterectomy, cervical cancer, or abnormal pap smears in past  Pap Smear: Not on file    Screening Not Indicated   Hepatitis C Screening Once for adults born between 1945 and 1965  More frequently in patients at high risk for Hepatitis C Hep C Antibody: 08/27/2019    Screening Current   Diabetes Screening 1-2 times per year if you're at risk for diabetes or have pre-diabetes Fasting glucose: 120 mg/dL   A1C: 5 8 %    Screening Not Indicated  History Diabetes   Cholesterol Screening Once every 5 years if you don't have a lipid disorder  May order more often based on risk factors  Lipid panel: 12/12/2019    Screening Not Indicated  History Lipid Disorder     Other Preventive Screenings Covered by Medicare:  1  Abdominal Aortic Aneurysm (AAA) Screening: covered once if your at risk  You're considered to be at risk if you have a family history of AAA  2  Lung Cancer Screening: covers low dose CT scan once per year if you meet all of the following conditions: (1) Age 50-69; (2) No signs or symptoms of lung cancer; (3) Current smoker or have quit smoking within the last 15 years; (4) You have a tobacco smoking history of at least 30 pack years (packs per day multiplied by number of years you smoked); (5) You get a written order from a healthcare provider  3  Glaucoma Screening: covered annually if you're considered high risk: (1) You have diabetes OR (2) Family history of glaucoma OR (3)  aged 48 and older OR (3)  American aged 72 and older  3  Osteoporosis Screening: covered every 2 years if you meet one of the following conditions: (1) You're estrogen deficient and at risk for osteoporosis based off medical history and other findings; (2) Have a vertebral abnormality; (3) On glucocorticoid therapy for more than 3 months; (4) Have primary hyperparathyroidism; (5) On osteoporosis medications and need to assess response to drug therapy  · Last bone density test (DXA Scan): 09/12/2017  5  HIV Screening: covered annually if you're between the age of 12-76  Also covered annually if you are younger than 13 and older than 72 with risk factors for HIV infection   For pregnant patients, it is covered up to 3 times per pregnancy  Immunizations:  Immunization Recommendations   Influenza Vaccine Annual influenza vaccination during flu season is recommended for all persons aged >= 6 months who do not have contraindications   Pneumococcal Vaccine (Prevnar and Pneumovax)  * Prevnar = PCV13  * Pneumovax = PPSV23   Adults 25-60 years old: 1-3 doses may be recommended based on certain risk factors  Adults 72 years old: Prevnar (PCV13) vaccine recommended followed by Pneumovax (PPSV23) vaccine  If already received PPSV23 since turning 65, then PCV13 recommended at least one year after PPSV23 dose  Hepatitis B Vaccine 3 dose series if at intermediate or high risk (ex: diabetes, end stage renal disease, liver disease)   Tetanus (Td) Vaccine - COST NOT COVERED BY MEDICARE PART B Following completion of primary series, a booster dose should be given every 10 years to maintain immunity against tetanus  Td may also be given as tetanus wound prophylaxis  Tdap Vaccine - COST NOT COVERED BY MEDICARE PART B Recommended at least once for all adults  For pregnant patients, recommended with each pregnancy  Shingles Vaccine (Shingrix) - COST NOT COVERED BY MEDICARE PART B  2 shot series recommended in those aged 48 and above     Health Maintenance Due:      Topic Date Due    MAMMOGRAM  10/10/2021    CRC Screening: Colonoscopy  04/30/2023    Hepatitis C Screening  Completed     Immunizations Due:      Topic Date Due    DTaP,Tdap,and Td Vaccines (1 - Tdap) 03/30/1956    Hepatitis B Vaccine (1 of 3 - Risk 3-dose series) 03/30/1964     Advance Directives   What are advance directives? Advance directives are legal documents that state your wishes and plans for medical care  These plans are made ahead of time in case you lose your ability to make decisions for yourself  Advance directives can apply to any medical decision, such as the treatments you want, and if you want to donate organs     What are the types of advance directives? There are many types of advance directives, and each state has rules about how to use them  You may choose a combination of any of the following:  · Living will: This is a written record of the treatment you want  You can also choose which treatments you do not want, which to limit, and which to stop at a certain time  This includes surgery, medicine, IV fluid, and tube feedings  · Durable power of  for healthcare [facility]): This is a written record that states who you want to make healthcare choices for you when you are unable to make them for yourself  This person, called a proxy, is usually a family member or a friend  You may choose more than 1 proxy  · Do not resuscitate (DNR) order:  A DNR order is used in case your heart stops beating or you stop breathing  It is a request not to have certain forms of treatment, such as CPR  A DNR order may be included in other types of advance directives  · Medical directive: This covers the care that you want if you are in a coma, near death, or unable to make decisions for yourself  You can list the treatments you want for each condition  Treatment may include pain medicine, surgery, blood transfusions, dialysis, IV or tube feedings, and a ventilator (breathing machine)  · Values history: This document has questions about your views, beliefs, and how you feel and think about life  This information can help others choose the care that you would choose  Why are advance directives important? An advance directive helps you control your care  Although spoken wishes may be used, it is better to have your wishes written down  Spoken wishes can be misunderstood, or not followed  Treatments may be given even if you do not want them  An advance directive may make it easier for your family to make difficult choices about your care     Weight Management   Why it is important to manage your weight:  Being overweight increases your risk of health conditions such as heart disease, high blood pressure, type 2 diabetes, and certain types of cancer  It can also increase your risk for osteoarthritis, sleep apnea, and other respiratory problems  Aim for a slow, steady weight loss  Even a small amount of weight loss can lower your risk of health problems  How to lose weight safely:  A safe and healthy way to lose weight is to eat fewer calories and get regular exercise  You can lose up about 1 pound a week by decreasing the number of calories you eat by 500 calories each day  Healthy meal plan for weight management:  A healthy meal plan includes a variety of foods, contains fewer calories, and helps you stay healthy  A healthy meal plan includes the following:  · Eat whole-grain foods more often  A healthy meal plan should contain fiber  Fiber is the part of grains, fruits, and vegetables that is not broken down by your body  Whole-grain foods are healthy and provide extra fiber in your diet  Some examples of whole-grain foods are whole-wheat breads and pastas, oatmeal, brown rice, and bulgur  · Eat a variety of vegetables every day  Include dark, leafy greens such as spinach, kale, carolyn greens, and mustard greens  Eat yellow and orange vegetables such as carrots, sweet potatoes, and winter squash  · Eat a variety of fruits every day  Choose fresh or canned fruit (canned in its own juice or light syrup) instead of juice  Fruit juice has very little or no fiber  · Eat low-fat dairy foods  Drink fat-free (skim) milk or 1% milk  Eat fat-free yogurt and low-fat cottage cheese  Try low-fat cheeses such as mozzarella and other reduced-fat cheeses  · Choose meat and other protein foods that are low in fat  Choose beans or other legumes such as split peas or lentils  Choose fish, skinless poultry (chicken or turkey), or lean cuts of red meat (beef or pork)  Before you cook meat or poultry, cut off any visible fat  · Use less fat and oil    Try baking foods instead of frying them  Add less fat, such as margarine, sour cream, regular salad dressing and mayonnaise to foods  Eat fewer high-fat foods  Some examples of high-fat foods include french fries, doughnuts, ice cream, and cakes  · Eat fewer sweets  Limit foods and drinks that are high in sugar  This includes candy, cookies, regular soda, and sweetened drinks  Exercise:  Exercise at least 30 minutes per day on most days of the week  Some examples of exercise include walking, biking, dancing, and swimming  You can also fit in more physical activity by taking the stairs instead of the elevator or parking farther away from stores  Ask your healthcare provider about the best exercise plan for you  © Copyright [employer] 2018 Information is for End User's use only and may not be sold, redistributed or otherwise used for commercial purposes   All illustrations and images included in CareNotes® are the copyrighted property of [employer]  or [address] [place] [address]

## 2019-12-28 DIAGNOSIS — M81.0 OSTEOPOROSIS, UNSPECIFIED OSTEOPOROSIS TYPE, UNSPECIFIED PATHOLOGICAL FRACTURE PRESENCE: ICD-10-CM

## 2019-12-29 RX ORDER — ALENDRONATE SODIUM 70 MG/1
TABLET ORAL
Qty: 4 TABLET | Refills: 0 | Status: SHIPPED | OUTPATIENT
Start: 2019-12-29 | End: 2020-01-27

## 2020-01-18 DIAGNOSIS — I10 ESSENTIAL HYPERTENSION: ICD-10-CM

## 2020-01-26 DIAGNOSIS — M81.0 OSTEOPOROSIS, UNSPECIFIED OSTEOPOROSIS TYPE, UNSPECIFIED PATHOLOGICAL FRACTURE PRESENCE: ICD-10-CM

## 2020-01-27 RX ORDER — ALENDRONATE SODIUM 70 MG/1
TABLET ORAL
Qty: 4 TABLET | Refills: 0 | Status: SHIPPED | OUTPATIENT
Start: 2020-01-27 | End: 2020-02-27

## 2020-02-02 DIAGNOSIS — M79.643 PAIN OF HAND, UNSPECIFIED LATERALITY: ICD-10-CM

## 2020-02-02 DIAGNOSIS — F32.A DEPRESSION, UNSPECIFIED DEPRESSION TYPE: ICD-10-CM

## 2020-02-05 RX ORDER — TRAZODONE HYDROCHLORIDE 100 MG/1
TABLET ORAL
Qty: 90 TABLET | Refills: 0 | Status: SHIPPED | OUTPATIENT
Start: 2020-02-05 | End: 2020-07-27

## 2020-02-05 RX ORDER — BUPROPION HYDROCHLORIDE 150 MG/1
TABLET ORAL
Qty: 30 TABLET | Refills: 0 | Status: SHIPPED | OUTPATIENT
Start: 2020-02-05 | End: 2020-03-27

## 2020-02-09 DIAGNOSIS — I10 ESSENTIAL HYPERTENSION: ICD-10-CM

## 2020-02-27 DIAGNOSIS — M81.0 OSTEOPOROSIS, UNSPECIFIED OSTEOPOROSIS TYPE, UNSPECIFIED PATHOLOGICAL FRACTURE PRESENCE: ICD-10-CM

## 2020-02-27 RX ORDER — ALENDRONATE SODIUM 70 MG/1
TABLET ORAL
Qty: 4 TABLET | Refills: 0 | Status: SHIPPED | OUTPATIENT
Start: 2020-02-27 | End: 2020-04-01

## 2020-03-27 DIAGNOSIS — F32.A DEPRESSION, UNSPECIFIED DEPRESSION TYPE: ICD-10-CM

## 2020-03-27 RX ORDER — BUPROPION HYDROCHLORIDE 150 MG/1
TABLET ORAL
Qty: 30 TABLET | Refills: 0 | Status: SHIPPED | OUTPATIENT
Start: 2020-03-27 | End: 2020-03-31 | Stop reason: SDUPTHER

## 2020-03-31 DIAGNOSIS — F32.A DEPRESSION, UNSPECIFIED DEPRESSION TYPE: ICD-10-CM

## 2020-03-31 DIAGNOSIS — M81.0 OSTEOPOROSIS, UNSPECIFIED OSTEOPOROSIS TYPE, UNSPECIFIED PATHOLOGICAL FRACTURE PRESENCE: ICD-10-CM

## 2020-03-31 RX ORDER — BUPROPION HYDROCHLORIDE 150 MG/1
150 TABLET ORAL DAILY
Qty: 30 TABLET | Refills: 0 | Status: SHIPPED | OUTPATIENT
Start: 2020-03-31 | End: 2020-06-01

## 2020-04-01 RX ORDER — ALENDRONATE SODIUM 70 MG/1
TABLET ORAL
Qty: 4 TABLET | Refills: 0 | Status: SHIPPED | OUTPATIENT
Start: 2020-04-01 | End: 2020-04-23

## 2020-04-23 DIAGNOSIS — M81.0 OSTEOPOROSIS, UNSPECIFIED OSTEOPOROSIS TYPE, UNSPECIFIED PATHOLOGICAL FRACTURE PRESENCE: ICD-10-CM

## 2020-04-23 RX ORDER — ALENDRONATE SODIUM 70 MG/1
TABLET ORAL
Qty: 4 TABLET | Refills: 0 | Status: SHIPPED | OUTPATIENT
Start: 2020-04-23 | End: 2020-05-27

## 2020-05-11 ENCOUNTER — APPOINTMENT (OUTPATIENT)
Dept: LAB | Facility: CLINIC | Age: 75
End: 2020-05-11
Payer: MEDICARE

## 2020-05-11 DIAGNOSIS — E11.9 TYPE 2 DIABETES MELLITUS WITHOUT COMPLICATION, WITHOUT LONG-TERM CURRENT USE OF INSULIN (HCC): ICD-10-CM

## 2020-05-11 LAB
EST. AVERAGE GLUCOSE BLD GHB EST-MCNC: 117 MG/DL
GLUCOSE P FAST SERPL-MCNC: 118 MG/DL (ref 65–99)
HBA1C MFR BLD: 5.7 %

## 2020-05-11 PROCEDURE — 36415 COLL VENOUS BLD VENIPUNCTURE: CPT

## 2020-05-11 PROCEDURE — 83036 HEMOGLOBIN GLYCOSYLATED A1C: CPT

## 2020-05-11 PROCEDURE — 82947 ASSAY GLUCOSE BLOOD QUANT: CPT

## 2020-05-15 ENCOUNTER — OFFICE VISIT (OUTPATIENT)
Dept: FAMILY MEDICINE CLINIC | Facility: CLINIC | Age: 75
End: 2020-05-15
Payer: MEDICARE

## 2020-05-15 VITALS
OXYGEN SATURATION: 98 % | TEMPERATURE: 97 F | WEIGHT: 124 LBS | BODY MASS INDEX: 25 KG/M2 | DIASTOLIC BLOOD PRESSURE: 72 MMHG | SYSTOLIC BLOOD PRESSURE: 124 MMHG | HEART RATE: 83 BPM | HEIGHT: 59 IN

## 2020-05-15 DIAGNOSIS — E11.9 TYPE 2 DIABETES MELLITUS WITHOUT COMPLICATION, WITHOUT LONG-TERM CURRENT USE OF INSULIN (HCC): Primary | ICD-10-CM

## 2020-05-15 DIAGNOSIS — E66.3 OVERWEIGHT: ICD-10-CM

## 2020-05-15 PROCEDURE — 3074F SYST BP LT 130 MM HG: CPT | Performed by: FAMILY MEDICINE

## 2020-05-15 PROCEDURE — 3078F DIAST BP <80 MM HG: CPT | Performed by: FAMILY MEDICINE

## 2020-05-15 PROCEDURE — 4040F PNEUMOC VAC/ADMIN/RCVD: CPT | Performed by: FAMILY MEDICINE

## 2020-05-15 PROCEDURE — 3044F HG A1C LEVEL LT 7.0%: CPT | Performed by: FAMILY MEDICINE

## 2020-05-15 PROCEDURE — 3008F BODY MASS INDEX DOCD: CPT | Performed by: FAMILY MEDICINE

## 2020-05-15 PROCEDURE — 99213 OFFICE O/P EST LOW 20 MIN: CPT | Performed by: FAMILY MEDICINE

## 2020-05-15 PROCEDURE — 1036F TOBACCO NON-USER: CPT | Performed by: FAMILY MEDICINE

## 2020-05-15 PROCEDURE — 1160F RVW MEDS BY RX/DR IN RCRD: CPT | Performed by: FAMILY MEDICINE

## 2020-05-26 DIAGNOSIS — M81.0 OSTEOPOROSIS, UNSPECIFIED OSTEOPOROSIS TYPE, UNSPECIFIED PATHOLOGICAL FRACTURE PRESENCE: ICD-10-CM

## 2020-05-26 DIAGNOSIS — E11.9 TYPE 2 DIABETES MELLITUS WITHOUT COMPLICATION, WITHOUT LONG-TERM CURRENT USE OF INSULIN (HCC): ICD-10-CM

## 2020-05-27 RX ORDER — BLOOD SUGAR DIAGNOSTIC
STRIP MISCELLANEOUS
Qty: 100 EACH | Refills: 0 | Status: SHIPPED | OUTPATIENT
Start: 2020-05-27 | End: 2021-04-15

## 2020-05-27 RX ORDER — LANCETS 33 GAUGE
EACH MISCELLANEOUS
Qty: 100 EACH | Refills: 0 | Status: SHIPPED | OUTPATIENT
Start: 2020-05-27 | End: 2021-04-15

## 2020-05-27 RX ORDER — ALENDRONATE SODIUM 70 MG/1
TABLET ORAL
Qty: 4 TABLET | Refills: 0 | Status: SHIPPED | OUTPATIENT
Start: 2020-05-27 | End: 2020-06-19

## 2020-06-01 DIAGNOSIS — F32.A DEPRESSION, UNSPECIFIED DEPRESSION TYPE: ICD-10-CM

## 2020-06-01 RX ORDER — BUPROPION HYDROCHLORIDE 150 MG/1
TABLET ORAL
Qty: 30 TABLET | Refills: 0 | Status: SHIPPED | OUTPATIENT
Start: 2020-06-01 | End: 2020-06-30

## 2020-06-19 DIAGNOSIS — M81.0 OSTEOPOROSIS, UNSPECIFIED OSTEOPOROSIS TYPE, UNSPECIFIED PATHOLOGICAL FRACTURE PRESENCE: ICD-10-CM

## 2020-06-19 RX ORDER — ALENDRONATE SODIUM 70 MG/1
TABLET ORAL
Qty: 4 TABLET | Refills: 0 | Status: SHIPPED | OUTPATIENT
Start: 2020-06-19 | End: 2020-07-01 | Stop reason: SDUPTHER

## 2020-06-25 DIAGNOSIS — M81.0 OSTEOPOROSIS, UNSPECIFIED OSTEOPOROSIS TYPE, UNSPECIFIED PATHOLOGICAL FRACTURE PRESENCE: ICD-10-CM

## 2020-06-25 DIAGNOSIS — E78.00 HYPERCHOLESTEROLEMIA: ICD-10-CM

## 2020-06-29 DIAGNOSIS — I10 ESSENTIAL HYPERTENSION: ICD-10-CM

## 2020-06-29 DIAGNOSIS — F32.A DEPRESSION, UNSPECIFIED DEPRESSION TYPE: ICD-10-CM

## 2020-06-29 RX ORDER — SIMVASTATIN 40 MG
TABLET ORAL
Qty: 90 TABLET | Refills: 0 | Status: SHIPPED | OUTPATIENT
Start: 2020-06-29 | End: 2020-06-30 | Stop reason: SDUPTHER

## 2020-06-30 DIAGNOSIS — E78.00 HYPERCHOLESTEROLEMIA: ICD-10-CM

## 2020-06-30 RX ORDER — IRBESARTAN 150 MG/1
TABLET ORAL
Qty: 90 TABLET | Refills: 0 | Status: SHIPPED | OUTPATIENT
Start: 2020-06-30 | End: 2020-10-05

## 2020-06-30 RX ORDER — BUPROPION HYDROCHLORIDE 150 MG/1
TABLET ORAL
Qty: 30 TABLET | Refills: 0 | Status: SHIPPED | OUTPATIENT
Start: 2020-06-30 | End: 2020-07-27

## 2020-07-01 DIAGNOSIS — E78.00 HYPERCHOLESTEROLEMIA: ICD-10-CM

## 2020-07-01 RX ORDER — ALENDRONATE SODIUM 70 MG/1
70 TABLET ORAL WEEKLY
Qty: 12 TABLET | Refills: 3 | Status: SHIPPED | OUTPATIENT
Start: 2020-07-01 | End: 2021-07-23 | Stop reason: SDUPTHER

## 2020-07-01 RX ORDER — SIMVASTATIN 40 MG
TABLET ORAL
Qty: 90 TABLET | Refills: 0 | Status: SHIPPED | OUTPATIENT
Start: 2020-07-01 | End: 2020-08-26 | Stop reason: ALTCHOICE

## 2020-07-01 RX ORDER — SIMVASTATIN 40 MG
40 TABLET ORAL DAILY
Qty: 90 TABLET | Refills: 3 | Status: SHIPPED | OUTPATIENT
Start: 2020-07-01 | End: 2021-06-29

## 2020-07-26 DIAGNOSIS — E11.9 TYPE 2 DIABETES MELLITUS WITHOUT COMPLICATION, WITHOUT LONG-TERM CURRENT USE OF INSULIN (HCC): ICD-10-CM

## 2020-07-26 DIAGNOSIS — M79.643 PAIN OF HAND, UNSPECIFIED LATERALITY: ICD-10-CM

## 2020-07-26 DIAGNOSIS — F32.A DEPRESSION, UNSPECIFIED DEPRESSION TYPE: ICD-10-CM

## 2020-07-27 RX ORDER — BUPROPION HYDROCHLORIDE 150 MG/1
TABLET ORAL
Qty: 30 TABLET | Refills: 0 | Status: SHIPPED | OUTPATIENT
Start: 2020-07-27 | End: 2020-09-03

## 2020-07-27 RX ORDER — TRAZODONE HYDROCHLORIDE 100 MG/1
TABLET ORAL
Qty: 90 TABLET | Refills: 0 | Status: SHIPPED | OUTPATIENT
Start: 2020-07-27 | End: 2020-10-27

## 2020-08-16 DIAGNOSIS — I10 ESSENTIAL HYPERTENSION: ICD-10-CM

## 2020-08-18 ENCOUNTER — LAB (OUTPATIENT)
Dept: LAB | Facility: CLINIC | Age: 75
End: 2020-08-18
Payer: MEDICARE

## 2020-08-18 DIAGNOSIS — E11.9 TYPE 2 DIABETES MELLITUS WITHOUT COMPLICATION, WITHOUT LONG-TERM CURRENT USE OF INSULIN (HCC): ICD-10-CM

## 2020-08-18 LAB
EST. AVERAGE GLUCOSE BLD GHB EST-MCNC: 131 MG/DL
GLUCOSE P FAST SERPL-MCNC: 136 MG/DL (ref 65–99)
HBA1C MFR BLD: 6.2 %

## 2020-08-18 PROCEDURE — 82947 ASSAY GLUCOSE BLOOD QUANT: CPT

## 2020-08-18 PROCEDURE — 83036 HEMOGLOBIN GLYCOSYLATED A1C: CPT

## 2020-08-18 PROCEDURE — 36415 COLL VENOUS BLD VENIPUNCTURE: CPT

## 2020-08-26 ENCOUNTER — OFFICE VISIT (OUTPATIENT)
Dept: FAMILY MEDICINE CLINIC | Facility: CLINIC | Age: 75
End: 2020-08-26
Payer: MEDICARE

## 2020-08-26 VITALS
TEMPERATURE: 96.6 F | DIASTOLIC BLOOD PRESSURE: 62 MMHG | BODY MASS INDEX: 25.2 KG/M2 | HEIGHT: 59 IN | HEART RATE: 66 BPM | WEIGHT: 125 LBS | SYSTOLIC BLOOD PRESSURE: 120 MMHG | OXYGEN SATURATION: 98 %

## 2020-08-26 DIAGNOSIS — E11.9 TYPE 2 DIABETES MELLITUS WITHOUT COMPLICATION, WITHOUT LONG-TERM CURRENT USE OF INSULIN (HCC): Primary | ICD-10-CM

## 2020-08-26 DIAGNOSIS — I10 ESSENTIAL HYPERTENSION: ICD-10-CM

## 2020-08-26 DIAGNOSIS — E78.00 HYPERCHOLESTEROLEMIA: ICD-10-CM

## 2020-08-26 DIAGNOSIS — E11.9 ENCOUNTER FOR DIABETIC FOOT EXAM (HCC): ICD-10-CM

## 2020-08-26 PROCEDURE — 3074F SYST BP LT 130 MM HG: CPT | Performed by: FAMILY MEDICINE

## 2020-08-26 PROCEDURE — 1160F RVW MEDS BY RX/DR IN RCRD: CPT | Performed by: FAMILY MEDICINE

## 2020-08-26 PROCEDURE — 3078F DIAST BP <80 MM HG: CPT | Performed by: FAMILY MEDICINE

## 2020-08-26 PROCEDURE — 3008F BODY MASS INDEX DOCD: CPT | Performed by: FAMILY MEDICINE

## 2020-08-26 PROCEDURE — 99214 OFFICE O/P EST MOD 30 MIN: CPT | Performed by: FAMILY MEDICINE

## 2020-08-26 PROCEDURE — 1036F TOBACCO NON-USER: CPT | Performed by: FAMILY MEDICINE

## 2020-08-26 PROCEDURE — 4040F PNEUMOC VAC/ADMIN/RCVD: CPT | Performed by: FAMILY MEDICINE

## 2020-08-26 PROCEDURE — 3044F HG A1C LEVEL LT 7.0%: CPT | Performed by: FAMILY MEDICINE

## 2020-08-26 NOTE — PROGRESS NOTES
Assessment/Plan:    No problem-specific Assessment & Plan notes found for this encounter  Diagnoses and all orders for this visit:    Type 2 diabetes mellitus without complication, without long-term current use of insulin ([facility])  Comments:  no change in the medication  Orders:  -     Glucose, fasting; Future  -     Hemoglobin A1C; Future    Encounter for diabetic foot exam ([facility] [address] )  -     Diabetic foot exam; Future    Essential hypertension  -     CBC and differential; Future    Hypercholesterolemia  -     Comprehensive metabolic panel; Future  -     Lipid panel; Future  -     TSH, 3rd generation with Free T4 reflex; Future          PHQ-9 Depression Screening    PHQ-9:    Frequency of the following problems over the past two weeks:       Little interest or pleasure in doing things:  0 - not at all  Feeling down, depressed, or hopeless:  0 - not at all  PHQ-2 Score:  0            Subjective:      Patient ID: Tasia Hicks is a 76 y o  female  Diabetes   She presents for her follow-up diabetic visit  She has type 2 diabetes mellitus  Her disease course has been stable  There are no hypoglycemic associated symptoms  There are no diabetic associated symptoms  Pertinent negatives for diabetes include no chest pain  There are no hypoglycemic complications  Symptoms are stable  Current diabetic treatment includes oral agent (monotherapy)  She is compliant with treatment most of the time  Her weight is stable  She is following a diabetic diet  Her overall blood glucose range is 110-130 mg/dl  An ACE inhibitor/angiotensin II receptor blocker is being taken  The following portions of the patient's history were reviewed and updated as appropriate: allergies, current medications, past family history, past medical history, past social history, past surgical history and problem list     Review of Systems   Eyes: Negative for visual disturbance  Cardiovascular: Negative for chest pain and palpitations  Gastrointestinal: Negative for abdominal pain, nausea and vomiting  Genitourinary: Negative for frequency  Skin: Negative for wound  Neurological: Negative for numbness  Objective:    /62   Pulse 66   Temp (!) 96 6 °F (35 9 °C)   Ht 4' 11" (1 499 m)   Wt 56 7 kg (125 lb)   SpO2 98%   BMI 25 25 kg/m²      Physical Exam  Vitals signs and nursing note reviewed  Constitutional:       General: She is not in acute distress  Appearance: She is well-developed  She is not diaphoretic  HENT:      Head: Normocephalic and atraumatic  Eyes:      General: No scleral icterus  Conjunctiva/sclera: Conjunctivae normal       Pupils: Pupils are equal, round, and reactive to light  Neck:      Musculoskeletal: Normal range of motion and neck supple  Cardiovascular:      Rate and Rhythm: Normal rate and regular rhythm  Pulses: no weak pulses          Dorsalis pedis pulses are 2+ on the right side and 2+ on the left side  Heart sounds: Normal heart sounds  No murmur  Pulmonary:      Effort: Pulmonary effort is normal  No respiratory distress  Breath sounds: Normal breath sounds  No wheezing or rales  Abdominal:      General: Bowel sounds are normal  There is no distension  Palpations: Abdomen is soft  There is no mass  Tenderness: There is no abdominal tenderness  There is no guarding or rebound  Musculoskeletal: Normal range of motion  General: No deformity  Feet:      Right foot:      Skin integrity: No ulcer, skin breakdown, erythema, warmth, callus or dry skin  Left foot:      Skin integrity: No ulcer, skin breakdown, erythema, warmth, callus or dry skin  Lymphadenopathy:      Cervical: No cervical adenopathy  Skin:     General: Skin is warm and dry  Neurological:      Mental Status: She is alert and oriented to person, place, and time  Psychiatric:         Behavior: Behavior normal          Thought Content:  Thought content normal  Judgment: Judgment normal        Patient's shoes and socks removed  Right Foot/Ankle   Right Foot Inspection  Skin Exam: skin normal and skin intact no dry skin, no warmth, no callus, no erythema, no maceration, no abnormal color, no pre-ulcer, no ulcer and no callus                          Toe Exam: ROM and strength within normal limits  Sensory       Monofilament testing: intact  Vascular  Capillary refills: < 3 seconds  The right DP pulse is 2+  Left Foot/Ankle  Left Foot Inspection  Skin Exam: skin normal and skin intactno dry skin, no warmth, no erythema, no maceration, normal color, no pre-ulcer, no ulcer and no callus                         Toe Exam: ROM and strength within normal limits                   Sensory       Monofilament: intact  Vascular  Capillary refills: < 3 seconds  The left DP pulse is 2+  Assign Risk Category:  No deformity present; No loss of protective sensation;  No weak pulses       Risk: 0

## 2020-09-03 DIAGNOSIS — F32.A DEPRESSION, UNSPECIFIED DEPRESSION TYPE: ICD-10-CM

## 2020-09-03 RX ORDER — BUPROPION HYDROCHLORIDE 150 MG/1
TABLET ORAL
Qty: 30 TABLET | Refills: 0 | Status: SHIPPED | OUTPATIENT
Start: 2020-09-03 | End: 2020-10-05

## 2020-09-23 ENCOUNTER — TRANSCRIBE ORDERS (OUTPATIENT)
Dept: ADMINISTRATIVE | Facility: HOSPITAL | Age: 75
End: 2020-09-23

## 2020-09-23 DIAGNOSIS — Z12.31 ENCOUNTER FOR SCREENING MAMMOGRAM FOR MALIGNANT NEOPLASM OF BREAST: Primary | ICD-10-CM

## 2020-10-05 DIAGNOSIS — F32.A DEPRESSION, UNSPECIFIED DEPRESSION TYPE: ICD-10-CM

## 2020-10-05 DIAGNOSIS — I10 ESSENTIAL HYPERTENSION: ICD-10-CM

## 2020-10-05 RX ORDER — BUPROPION HYDROCHLORIDE 150 MG/1
TABLET ORAL
Qty: 30 TABLET | Refills: 0 | Status: SHIPPED | OUTPATIENT
Start: 2020-10-05 | End: 2020-11-02

## 2020-10-05 RX ORDER — IRBESARTAN 150 MG/1
TABLET ORAL
Qty: 90 TABLET | Refills: 0 | Status: SHIPPED | OUTPATIENT
Start: 2020-10-05 | End: 2021-02-05

## 2020-10-12 ENCOUNTER — HOSPITAL ENCOUNTER (OUTPATIENT)
Dept: MAMMOGRAPHY | Facility: HOSPITAL | Age: 75
Discharge: HOME | End: 2020-10-12
Attending: SPECIALIST
Payer: MEDICARE

## 2020-10-12 VITALS — BODY MASS INDEX: 25.2 KG/M2 | WEIGHT: 125 LBS | HEIGHT: 59 IN

## 2020-10-12 DIAGNOSIS — Z12.31 ENCOUNTER FOR SCREENING MAMMOGRAM FOR MALIGNANT NEOPLASM OF BREAST: ICD-10-CM

## 2020-10-12 PROCEDURE — 77063 BREAST TOMOSYNTHESIS BI: CPT

## 2020-10-12 PROCEDURE — 77067 SCR MAMMO BI INCL CAD: CPT

## 2020-10-25 DIAGNOSIS — M79.643 PAIN OF HAND, UNSPECIFIED LATERALITY: ICD-10-CM

## 2020-10-27 RX ORDER — TRAZODONE HYDROCHLORIDE 100 MG/1
TABLET ORAL
Qty: 90 TABLET | Refills: 0 | Status: SHIPPED | OUTPATIENT
Start: 2020-10-27 | End: 2020-10-28 | Stop reason: SDUPTHER

## 2020-10-28 DIAGNOSIS — M79.643 PAIN OF HAND, UNSPECIFIED LATERALITY: ICD-10-CM

## 2020-10-29 RX ORDER — TRAZODONE HYDROCHLORIDE 100 MG/1
100 TABLET ORAL
Qty: 90 TABLET | Refills: 0 | Status: SHIPPED | OUTPATIENT
Start: 2020-10-29 | End: 2021-05-05

## 2020-11-02 DIAGNOSIS — F32.A DEPRESSION, UNSPECIFIED DEPRESSION TYPE: ICD-10-CM

## 2020-11-02 RX ORDER — BUPROPION HYDROCHLORIDE 150 MG/1
TABLET ORAL
Qty: 30 TABLET | Refills: 0 | Status: SHIPPED | OUTPATIENT
Start: 2020-11-02 | End: 2020-12-07

## 2020-11-12 DIAGNOSIS — I10 ESSENTIAL HYPERTENSION: ICD-10-CM

## 2020-12-06 DIAGNOSIS — F32.A DEPRESSION, UNSPECIFIED DEPRESSION TYPE: ICD-10-CM

## 2020-12-07 RX ORDER — BUPROPION HYDROCHLORIDE 150 MG/1
TABLET ORAL
Qty: 30 TABLET | Refills: 0 | Status: SHIPPED | OUTPATIENT
Start: 2020-12-07 | End: 2021-01-05

## 2020-12-15 LAB
LEFT EYE DIABETIC RETINOPATHY: NORMAL
RIGHT EYE DIABETIC RETINOPATHY: NORMAL
SEVERITY (EYE EXAM): NORMAL

## 2020-12-29 ENCOUNTER — LAB (OUTPATIENT)
Dept: LAB | Facility: CLINIC | Age: 75
End: 2020-12-29
Payer: MEDICARE

## 2020-12-29 DIAGNOSIS — E11.9 TYPE 2 DIABETES MELLITUS WITHOUT COMPLICATION, WITHOUT LONG-TERM CURRENT USE OF INSULIN (HCC): ICD-10-CM

## 2020-12-29 DIAGNOSIS — I10 ESSENTIAL HYPERTENSION: ICD-10-CM

## 2020-12-29 DIAGNOSIS — E78.00 HYPERCHOLESTEROLEMIA: ICD-10-CM

## 2020-12-29 LAB
ALBUMIN SERPL BCP-MCNC: 3.5 G/DL (ref 3.5–5)
ALP SERPL-CCNC: 61 U/L (ref 46–116)
ALT SERPL W P-5'-P-CCNC: 36 U/L (ref 12–78)
ANION GAP SERPL CALCULATED.3IONS-SCNC: 3 MMOL/L (ref 4–13)
AST SERPL W P-5'-P-CCNC: 17 U/L (ref 5–45)
BASOPHILS # BLD AUTO: 0.03 THOUSANDS/ΜL (ref 0–0.1)
BASOPHILS NFR BLD AUTO: 1 % (ref 0–1)
BILIRUB SERPL-MCNC: 0.68 MG/DL (ref 0.2–1)
BUN SERPL-MCNC: 13 MG/DL (ref 5–25)
CALCIUM SERPL-MCNC: 9.5 MG/DL (ref 8.3–10.1)
CHLORIDE SERPL-SCNC: 109 MMOL/L (ref 100–108)
CHOLEST SERPL-MCNC: 135 MG/DL (ref 50–200)
CO2 SERPL-SCNC: 29 MMOL/L (ref 21–32)
CREAT SERPL-MCNC: 0.83 MG/DL (ref 0.6–1.3)
EOSINOPHIL # BLD AUTO: 0.24 THOUSAND/ΜL (ref 0–0.61)
EOSINOPHIL NFR BLD AUTO: 4 % (ref 0–6)
ERYTHROCYTE [DISTWIDTH] IN BLOOD BY AUTOMATED COUNT: 13.8 % (ref 11.6–15.1)
EST. AVERAGE GLUCOSE BLD GHB EST-MCNC: 126 MG/DL
GFR SERPL CREATININE-BSD FRML MDRD: 69 ML/MIN/1.73SQ M
GLUCOSE P FAST SERPL-MCNC: 139 MG/DL (ref 65–99)
HBA1C MFR BLD: 6 %
HCT VFR BLD AUTO: 45.1 % (ref 34.8–46.1)
HDLC SERPL-MCNC: 51 MG/DL
HGB BLD-MCNC: 14.4 G/DL (ref 11.5–15.4)
IMM GRANULOCYTES # BLD AUTO: 0.01 THOUSAND/UL (ref 0–0.2)
IMM GRANULOCYTES NFR BLD AUTO: 0 % (ref 0–2)
LDLC SERPL CALC-MCNC: 49 MG/DL (ref 0–100)
LYMPHOCYTES # BLD AUTO: 1.17 THOUSANDS/ΜL (ref 0.6–4.47)
LYMPHOCYTES NFR BLD AUTO: 21 % (ref 14–44)
MCH RBC QN AUTO: 30.6 PG (ref 26.8–34.3)
MCHC RBC AUTO-ENTMCNC: 31.9 G/DL (ref 31.4–37.4)
MCV RBC AUTO: 96 FL (ref 82–98)
MONOCYTES # BLD AUTO: 0.65 THOUSAND/ΜL (ref 0.17–1.22)
MONOCYTES NFR BLD AUTO: 11 % (ref 4–12)
NEUTROPHILS # BLD AUTO: 3.59 THOUSANDS/ΜL (ref 1.85–7.62)
NEUTS SEG NFR BLD AUTO: 63 % (ref 43–75)
NONHDLC SERPL-MCNC: 84 MG/DL
NRBC BLD AUTO-RTO: 0 /100 WBCS
PLATELET # BLD AUTO: 200 THOUSANDS/UL (ref 149–390)
PMV BLD AUTO: 9.9 FL (ref 8.9–12.7)
POTASSIUM SERPL-SCNC: 4 MMOL/L (ref 3.5–5.3)
PROT SERPL-MCNC: 6.8 G/DL (ref 6.4–8.2)
RBC # BLD AUTO: 4.71 MILLION/UL (ref 3.81–5.12)
SODIUM SERPL-SCNC: 141 MMOL/L (ref 136–145)
TRIGL SERPL-MCNC: 176 MG/DL
TSH SERPL DL<=0.05 MIU/L-ACNC: 1.44 UIU/ML (ref 0.36–3.74)
WBC # BLD AUTO: 5.69 THOUSAND/UL (ref 4.31–10.16)

## 2020-12-29 PROCEDURE — 80061 LIPID PANEL: CPT

## 2020-12-29 PROCEDURE — 80053 COMPREHEN METABOLIC PANEL: CPT

## 2020-12-29 PROCEDURE — 84443 ASSAY THYROID STIM HORMONE: CPT

## 2020-12-29 PROCEDURE — 83036 HEMOGLOBIN GLYCOSYLATED A1C: CPT

## 2020-12-29 PROCEDURE — 85025 COMPLETE CBC W/AUTO DIFF WBC: CPT

## 2020-12-29 PROCEDURE — 36415 COLL VENOUS BLD VENIPUNCTURE: CPT

## 2021-01-05 DIAGNOSIS — E11.9 TYPE 2 DIABETES MELLITUS WITHOUT COMPLICATION, WITHOUT LONG-TERM CURRENT USE OF INSULIN (HCC): ICD-10-CM

## 2021-01-05 DIAGNOSIS — F32.A DEPRESSION, UNSPECIFIED DEPRESSION TYPE: ICD-10-CM

## 2021-01-05 RX ORDER — BUPROPION HYDROCHLORIDE 150 MG/1
TABLET ORAL
Qty: 30 TABLET | Refills: 0 | Status: SHIPPED | OUTPATIENT
Start: 2021-01-05 | End: 2021-02-05

## 2021-01-21 DIAGNOSIS — Z23 ENCOUNTER FOR IMMUNIZATION: ICD-10-CM

## 2021-01-22 ENCOUNTER — OFFICE VISIT (OUTPATIENT)
Dept: FAMILY MEDICINE CLINIC | Facility: CLINIC | Age: 76
End: 2021-01-22
Payer: COMMERCIAL

## 2021-01-22 ENCOUNTER — TELEPHONE (OUTPATIENT)
Dept: ADMINISTRATIVE | Facility: OTHER | Age: 76
End: 2021-01-22

## 2021-01-22 VITALS
TEMPERATURE: 97.7 F | HEART RATE: 71 BPM | SYSTOLIC BLOOD PRESSURE: 128 MMHG | HEIGHT: 59 IN | DIASTOLIC BLOOD PRESSURE: 70 MMHG | BODY MASS INDEX: 25.44 KG/M2 | WEIGHT: 126.2 LBS | OXYGEN SATURATION: 95 %

## 2021-01-22 DIAGNOSIS — Z13.31 NEGATIVE DEPRESSION SCREENING: ICD-10-CM

## 2021-01-22 DIAGNOSIS — E78.1 HYPERTRIGLYCERIDEMIA: ICD-10-CM

## 2021-01-22 DIAGNOSIS — M81.0 OSTEOPOROSIS, UNSPECIFIED OSTEOPOROSIS TYPE, UNSPECIFIED PATHOLOGICAL FRACTURE PRESENCE: ICD-10-CM

## 2021-01-22 DIAGNOSIS — I10 ESSENTIAL HYPERTENSION: ICD-10-CM

## 2021-01-22 DIAGNOSIS — E11.9 TYPE 2 DIABETES MELLITUS WITHOUT COMPLICATION, WITHOUT LONG-TERM CURRENT USE OF INSULIN (HCC): ICD-10-CM

## 2021-01-22 DIAGNOSIS — Z00.00 MEDICARE ANNUAL WELLNESS VISIT, SUBSEQUENT: Primary | ICD-10-CM

## 2021-01-22 DIAGNOSIS — Z13.5 SCREENING FOR DIABETIC RETINOPATHY: ICD-10-CM

## 2021-01-22 DIAGNOSIS — E66.3 OVERWEIGHT (BMI 25.0-29.9): ICD-10-CM

## 2021-01-22 PROCEDURE — G0439 PPPS, SUBSEQ VISIT: HCPCS | Performed by: FAMILY MEDICINE

## 2021-01-22 PROCEDURE — 3725F SCREEN DEPRESSION PERFORMED: CPT | Performed by: FAMILY MEDICINE

## 2021-01-22 PROCEDURE — 99214 OFFICE O/P EST MOD 30 MIN: CPT | Performed by: FAMILY MEDICINE

## 2021-01-22 PROCEDURE — 3074F SYST BP LT 130 MM HG: CPT | Performed by: FAMILY MEDICINE

## 2021-01-22 PROCEDURE — 1036F TOBACCO NON-USER: CPT | Performed by: FAMILY MEDICINE

## 2021-01-22 PROCEDURE — 1160F RVW MEDS BY RX/DR IN RCRD: CPT | Performed by: FAMILY MEDICINE

## 2021-01-22 PROCEDURE — 1170F FXNL STATUS ASSESSED: CPT | Performed by: FAMILY MEDICINE

## 2021-01-22 PROCEDURE — 1125F AMNT PAIN NOTED PAIN PRSNT: CPT | Performed by: FAMILY MEDICINE

## 2021-01-22 PROCEDURE — 3288F FALL RISK ASSESSMENT DOCD: CPT | Performed by: FAMILY MEDICINE

## 2021-01-22 PROCEDURE — 3078F DIAST BP <80 MM HG: CPT | Performed by: FAMILY MEDICINE

## 2021-01-22 NOTE — PATIENT INSTRUCTIONS
Medicare Preventive Visit Patient Instructions  Thank you for completing your Welcome to Medicare Visit or Medicare Annual Wellness Visit today  Your next wellness visit will be due in one year (1/22/2022)  The screening/preventive services that you may require over the next 5-10 years are detailed below  Some tests may not apply to you based off risk factors and/or age  Screening tests ordered at today's visit but not completed yet may show as past due  Also, please note that scanned in results may not display below  Preventive Screenings:  Service Recommendations Previous Testing/Comments   Colorectal Cancer Screening  * Colonoscopy    * Fecal Occult Blood Test (FOBT)/Fecal Immunochemical Test (FIT)  * Fecal DNA/Cologuard Test  * Flexible Sigmoidoscopy Age: 54-65 years old   Colonoscopy: every 10 years (may be performed more frequently if at higher risk)  OR  FOBT/FIT: every 1 year  OR  Cologuard: every 3 years  OR  Sigmoidoscopy: every 5 years  Screening may be recommended earlier than age 48 if at higher risk for colorectal cancer  Also, an individualized decision between you and your healthcare provider will decide whether screening between the ages of 74-80 would be appropriate  Colonoscopy: 04/30/2013  FOBT/FIT: Not on file  Cologuard: Not on file  Sigmoidoscopy: Not on file    Screening Current     Breast Cancer Screening Age: 36 years old  Frequency: every 1-2 years  Not required if history of left and right mastectomy Mammogram: 10/12/2020    Screening Current   Cervical Cancer Screening Between the ages of 21-29, pap smear recommended once every 3 years  Between the ages of 33-67, can perform pap smear with HPV co-testing every 5 years     Recommendations may differ for women with a history of total hysterectomy, cervical cancer, or abnormal pap smears in past  Pap Smear: Not on file    Screening Not Indicated   Hepatitis C Screening Once for adults born between 1945 and 1965  More frequently in patients at high risk for Hepatitis C Hep C Antibody: 08/27/2019    Screening Current   Diabetes Screening 1-2 times per year if you're at risk for diabetes or have pre-diabetes Fasting glucose: 139 mg/dL   A1C: 6 0 %    Screening Not Indicated  History Diabetes   Cholesterol Screening Once every 5 years if you don't have a lipid disorder  May order more often based on risk factors  Lipid panel: 12/29/2020    Screening Not Indicated  History Lipid Disorder     Other Preventive Screenings Covered by Medicare:  1  Abdominal Aortic Aneurysm (AAA) Screening: covered once if your at risk  You're considered to be at risk if you have a family history of AAA  2  Lung Cancer Screening: covers low dose CT scan once per year if you meet all of the following conditions: (1) Age 50-69; (2) No signs or symptoms of lung cancer; (3) Current smoker or have quit smoking within the last 15 years; (4) You have a tobacco smoking history of at least 30 pack years (packs per day multiplied by number of years you smoked); (5) You get a written order from a healthcare provider  3  Glaucoma Screening: covered annually if you're considered high risk: (1) You have diabetes OR (2) Family history of glaucoma OR (3)  aged 48 and older OR (3)  American aged 72 and older  3  Osteoporosis Screening: covered every 2 years if you meet one of the following conditions: (1) You're estrogen deficient and at risk for osteoporosis based off medical history and other findings; (2) Have a vertebral abnormality; (3) On glucocorticoid therapy for more than 3 months; (4) Have primary hyperparathyroidism; (5) On osteoporosis medications and need to assess response to drug therapy  · Last bone density test (DXA Scan): 09/12/2017  5  HIV Screening: covered annually if you're between the age of 12-76  Also covered annually if you are younger than 13 and older than 72 with risk factors for HIV infection   For pregnant patients, it is covered up to 3 times per pregnancy  Immunizations:  Immunization Recommendations   Influenza Vaccine Annual influenza vaccination during flu season is recommended for all persons aged >= 6 months who do not have contraindications   Pneumococcal Vaccine (Prevnar and Pneumovax)  * Prevnar = PCV13  * Pneumovax = PPSV23   Adults 25-60 years old: 1-3 doses may be recommended based on certain risk factors  Adults 72 years old: Prevnar (PCV13) vaccine recommended followed by Pneumovax (PPSV23) vaccine  If already received PPSV23 since turning 65, then PCV13 recommended at least one year after PPSV23 dose  Hepatitis B Vaccine 3 dose series if at intermediate or high risk (ex: diabetes, end stage renal disease, liver disease)   Tetanus (Td) Vaccine - COST NOT COVERED BY MEDICARE PART B Following completion of primary series, a booster dose should be given every 10 years to maintain immunity against tetanus  Td may also be given as tetanus wound prophylaxis  Tdap Vaccine - COST NOT COVERED BY MEDICARE PART B Recommended at least once for all adults  For pregnant patients, recommended with each pregnancy  Shingles Vaccine (Shingrix) - COST NOT COVERED BY MEDICARE PART B  2 shot series recommended in those aged 48 and above     Health Maintenance Due:      Topic Date Due    Colorectal Cancer Screening  04/30/2023    Hepatitis C Screening  Completed     Immunizations Due:  There are no preventive care reminders to display for this patient  Advance Directives   What are advance directives? Advance directives are legal documents that state your wishes and plans for medical care  These plans are made ahead of time in case you lose your ability to make decisions for yourself  Advance directives can apply to any medical decision, such as the treatments you want, and if you want to donate organs  What are the types of advance directives?   There are many types of advance directives, and each state has rules about how to use them  You may choose a combination of any of the following:  · Living will: This is a written record of the treatment you want  You can also choose which treatments you do not want, which to limit, and which to stop at a certain time  This includes surgery, medicine, IV fluid, and tube feedings  · Durable power of  for healthcare [facility]): This is a written record that states who you want to make healthcare choices for you when you are unable to make them for yourself  This person, called a proxy, is usually a family member or a friend  You may choose more than 1 proxy  · Do not resuscitate (DNR) order:  A DNR order is used in case your heart stops beating or you stop breathing  It is a request not to have certain forms of treatment, such as CPR  A DNR order may be included in other types of advance directives  · Medical directive: This covers the care that you want if you are in a coma, near death, or unable to make decisions for yourself  You can list the treatments you want for each condition  Treatment may include pain medicine, surgery, blood transfusions, dialysis, IV or tube feedings, and a ventilator (breathing machine)  · Values history: This document has questions about your views, beliefs, and how you feel and think about life  This information can help others choose the care that you would choose  Why are advance directives important? An advance directive helps you control your care  Although spoken wishes may be used, it is better to have your wishes written down  Spoken wishes can be misunderstood, or not followed  Treatments may be given even if you do not want them  An advance directive may make it easier for your family to make difficult choices about your care  Fall Prevention    Fall prevention  includes ways to make your home and other areas safer  It also includes ways you can move more carefully to prevent a fall   Health conditions that cause changes in your blood pressure, vision, or muscle strength and coordination may increase your risk for falls  Medicines may also increase your risk for falls if they make you dizzy, weak, or sleepy  Fall prevention tips:   · Stand or sit up slowly  · Use assistive devices as directed  · Wear shoes that fit well and have soles that   · Wear a personal alarm  · Stay active  · Manage your medical conditions  Home Safety Tips:  · Add items to prevent falls in the bathroom  · Keep paths clear  · Install bright lights in your home  · Keep items you use often on shelves within reach  · Paint or place reflective tape on the edges of your stairs  Weight Management   Why it is important to manage your weight:  Being overweight increases your risk of health conditions such as heart disease, high blood pressure, type 2 diabetes, and certain types of cancer  It can also increase your risk for osteoarthritis, sleep apnea, and other respiratory problems  Aim for a slow, steady weight loss  Even a small amount of weight loss can lower your risk of health problems  How to lose weight safely:  A safe and healthy way to lose weight is to eat fewer calories and get regular exercise  You can lose up about 1 pound a week by decreasing the number of calories you eat by 500 calories each day  Healthy meal plan for weight management:  A healthy meal plan includes a variety of foods, contains fewer calories, and helps you stay healthy  A healthy meal plan includes the following:  · Eat whole-grain foods more often  A healthy meal plan should contain fiber  Fiber is the part of grains, fruits, and vegetables that is not broken down by your body  Whole-grain foods are healthy and provide extra fiber in your diet  Some examples of whole-grain foods are whole-wheat breads and pastas, oatmeal, brown rice, and bulgur  · Eat a variety of vegetables every day    Include dark, leafy greens such as spinach, kale, carolyn greens, and mustard greens  Eat yellow and orange vegetables such as carrots, sweet potatoes, and winter squash  · Eat a variety of fruits every day  Choose fresh or canned fruit (canned in its own juice or light syrup) instead of juice  Fruit juice has very little or no fiber  · Eat low-fat dairy foods  Drink fat-free (skim) milk or 1% milk  Eat fat-free yogurt and low-fat cottage cheese  Try low-fat cheeses such as mozzarella and other reduced-fat cheeses  · Choose meat and other protein foods that are low in fat  Choose beans or other legumes such as split peas or lentils  Choose fish, skinless poultry (chicken or turkey), or lean cuts of red meat (beef or pork)  Before you cook meat or poultry, cut off any visible fat  · Use less fat and oil  Try baking foods instead of frying them  Add less fat, such as margarine, sour cream, regular salad dressing and mayonnaise to foods  Eat fewer high-fat foods  Some examples of high-fat foods include french fries, doughnuts, ice cream, and cakes  · Eat fewer sweets  Limit foods and drinks that are high in sugar  This includes candy, cookies, regular soda, and sweetened drinks  Exercise:  Exercise at least 30 minutes per day on most days of the week  Some examples of exercise include walking, biking, dancing, and swimming  You can also fit in more physical activity by taking the stairs instead of the elevator or parking farther away from stores  Ask your healthcare provider about the best exercise plan for you  © Copyright [employer] 2018 Information is for End User's use only and may not be sold, redistributed or otherwise used for commercial purposes   All illustrations and images included in CareNotes® are the copyrighted property of A D A M , Inc  or [address] [place] [address] [employer]

## 2021-01-22 NOTE — LETTER
Diabetic Eye Exam Form    Date Requested: 21  Patient: Shannon Barnes  Patient : 1945   Referring Provider: Abdulkadir Mejia,     Dilated Retinal Exam, Optomap-Iris Exam, or Fundus Photography Done         Yes ([place] one above)         No     Date of Diabetic Eye Exam ______________________________  Left Eye      Exam did show retinopathy    Exam did not show retinopathy         Mild       Moderate       None       Proliferative       Severe     Right Eye     Exam did show retinopathy    Exam did not show retinopathy         Mild       Moderate       None       Proliferative       Severe     Comments __________________________________________________________    Practice Providing Exam ______________________________________________    Exam Performed By (print name) _______________________________________      Provider Signature ___________________________________________________      These reports are needed for  compliance    Please fax this completed form and a copy of the Diabetic Eye Exam report to our office located at [place] [address] as soon as possible to [phone] laith Latham: Phone [phone]    We thank you for your assistance in treating our mutual patient

## 2021-01-22 NOTE — PROGRESS NOTES
Assessment and Plan:     Problem List Items Addressed This Visit        Endocrine    Type 2 diabetes mellitus without complication, without long-term current use of insulin ([address] )      Other Visit Diagnoses     Medicare annual wellness visit, subsequent    -  Primary    Essential hypertension        no change in the medication    Osteoporosis, unspecified osteoporosis type, unspecified pathological fracture presence        Screening for diabetic retinopathy        Overweight (BMI 25 0-29  9)        Negative depression screening        Hypertriglyceridemia        pt counseled on diet and exercise           Preventive health issues were discussed with patient, and age appropriate screening tests were ordered as noted in patient's After Visit Summary  Personalized health advice and appropriate referrals for health education or preventive services given if needed, as noted in patient's After Visit Summary  History of Present Illness:     Patient presents for Medicare Annual Wellness visit    Patient Care Team:  Luana Crowe DO as PCP - General  Luana Crowe DO     Problem List:     Patient Active Problem List   Diagnosis    Type 2 diabetes mellitus without complication, without long-term current use of insulin ([address] )    Overweight      Past Medical and Surgical History:     Past Medical History:   Diagnosis Date    Diabetes ([address] )     Hx of benign breast biopsy 10/31/2018    Left breast mammogram and ultrasound 10/10/2018      Hyperlipidemia      Past Surgical History:   Procedure Laterality Date    BREAST BIOPSY Left 1990    benign per patient report    BREAST BIOPSY Left 11/13/2018    benign   NO CLIP    BREAST SURGERY Left 1990    biopsy on left breast    CHOLECYSTECTOMY      HERNIA REPAIR      HYSTERECTOMY  04/15/2016    US GUIDED BREAST BIOPSY LEFT COMPLETE Left 11/13/2018      Family History:     Family History   Problem Relation Age of Onset    Heart failure Mother     Diabetes Father    Julieann Frankel No Known Problems Maternal Grandmother     No Known Problems Maternal Grandfather     No Known Problems Paternal Grandmother     No Known Problems Paternal Grandfather     No Known Problems Maternal Aunt     No Known Problems Maternal Aunt     No Known Problems Paternal Aunt     No Known Problems Paternal Aunt     No Known Problems Paternal Aunt     No Known Problems Paternal Aunt       Social History:        Social History     Socioeconomic History    Marital status:       Spouse name: None    Number of children: 2    Years of education: None    Highest education level: None   Occupational History    Occupation: retired   Social Needs    Financial resource strain: None    Food insecurity     Worry: None     Inability: None    Transportation needs     Medical: None     Non-medical: None   Tobacco Use    Smoking status: Never Smoker    Smokeless tobacco: Never Used   Substance and Sexual Activity    Alcohol use: No    Drug use: No    Sexual activity: None   Lifestyle    Physical activity     Days per week: None     Minutes per session: None    Stress: None   Relationships    Social connections     Talks on phone: None     Gets together: None     Attends Sikh service: None     Active member of club or organization: None     Attends meetings of clubs or organizations: None     Relationship status: None    Intimate partner violence     Fear of current or ex partner: None     Emotionally abused: None     Physically abused: None     Forced sexual activity: None   Other Topics Concern    None   Social History Narrative    None      Medications and Allergies:     Current Outpatient Medications   Medication Sig Dispense Refill    alendronate (FOSAMAX) 70 mg tablet Take 1 tablet (70 mg total) by mouth once a week 12 tablet 3    buPROPion (WELLBUTRIN XL) 150 mg 24 hr tablet Take 1 tablet by mouth once daily 30 tablet 0    irbesartan (AVAPRO) 150 mg tablet Take 1 tablet by mouth once daily 90 tablet 0    metFORMIN (GLUCOPHAGE) 500 mg tablet Take 1 tablet by mouth twice daily 180 tablet 0    metoprolol tartrate (LOPRESSOR) 25 mg tablet Take 1 tablet by mouth once daily 90 tablet 0    OneTouch Delica Lancets 39U MISC USE 1  TO CHECK GLUCOSE DAILY 100 each 0    ONETOUCH ULTRA test strip USE 1 STRIP TO CHECK GLUCOSE ONCE DAILY 100 each 0    simvastatin (ZOCOR) 40 mg tablet Take 1 tablet (40 mg total) by mouth daily 90 tablet 3    traZODone (DESYREL) 100 mg tablet Take 1 tablet (100 mg total) by mouth daily at bedtime 90 tablet 0    ibuprofen (MOTRIN) 600 mg tablet Take 1 tablet (600 mg total) by mouth every 8 (eight) hours as needed for mild pain or moderate pain (Patient not taking: Reported on 12/18/2019) 30 tablet 2     No current facility-administered medications for this visit  No Known Allergies   Immunizations:     Immunization History   Administered Date(s) Administered    INFLUENZA 09/25/2013, 09/07/2018    Influenza Split High Dose Preservative Free IM 11/09/2017    Influenza, high dose seasonal 0 7 mL 09/07/2018, 10/24/2019, 10/07/2020    Influenza, seasonal, injectable 10/20/2010, 01/20/2012, 09/18/2012, 10/28/2014, 09/16/2016    Pneumococcal Conjugate 13-Valent 11/09/2017    Pneumococcal Polysaccharide PPV23 08/23/2010    Tdap 06/04/2020      Health Maintenance:         Topic Date Due    Colorectal Cancer Screening  04/30/2023    Hepatitis C Screening  Completed     There are no preventive care reminders to display for this patient  Medicare Health Risk Assessment:     /70   Pulse 71   Temp 97 7 °F (36 5 °C) (Tympanic)   Ht 4' 11" (1 499 m)   Wt 57 2 kg (126 lb 3 2 oz)   SpO2 95%   BMI 25 49 kg/m²      Kyara Oconnor is here for her Subsequent Wellness visit  Last Medicare Wellness visit information reviewed, patient interviewed and updates made to the record today  Health Risk Assessment:   Patient rates overall health as good   Patient feels that their physical health rating is same  Eyesight was rated as same  Hearing was rated as same  Patient feels that their emotional and mental health rating is same  Pain experienced in the last 7 days has been none  Patient states that she has experienced no weight loss or gain in last 6 months  Depression Screening:   PHQ-2 Score: 1      Fall Risk Screening: In the past year, patient has experienced: history of falling in past year    Number of falls: 1  Injured during fall?: No    Feels unsteady when standing or walking?: Yes    Worried about falling?: No      Urinary Incontinence Screening:   Patient has not leaked urine accidently in the last six months  Home Safety:  Patient does not have trouble with stairs inside or outside of their home  Patient has working smoke alarms and has working carbon monoxide detector  Home safety hazards include: none  Nutrition:   Current diet is Regular  Medications:   Patient is currently taking over-the-counter supplements  OTC medications include: see medication list  Patient is able to manage medications  Activities of Daily Living (ADLs)/Instrumental Activities of Daily Living (IADLs):   Walk and transfer into and out of bed and chair?: Yes  Dress and groom yourself?: Yes    Bathe or shower yourself?: Yes    Feed yourself? Yes  Do your laundry/housekeeping?: Yes  Manage your money, pay your bills and track your expenses?: Yes  Make your own meals?: Yes    Do your own shopping?: Yes    Previous Hospitalizations:   Any hospitalizations or ED visits within the last 12 months?: No      Advance Care Planning:   Living will: Yes    Durable POA for healthcare:  Yes    Advanced directive: Yes    Advanced directive counseling given: No    Five wishes given: No    Patient declined ACP directive: No    End of Life Decisions reviewed with patient: Yes    Provider agrees with end of life decisions: Yes      Cognitive Screening:   Provider or family/friend/caregiver concerned regarding cognition?: No    PREVENTIVE SCREENINGS      Cardiovascular Screening:    General: Screening Not Indicated and History Lipid Disorder      Diabetes Screening:     General: Screening Not Indicated and History Diabetes      Colorectal Cancer Screening:     General: Screening Current      Breast Cancer Screening:     General: Screening Current      Cervical Cancer Screening:    General: Screening Not Indicated      Osteoporosis Screening:    General: Screening Not Indicated and History Osteoporosis      Abdominal Aortic Aneurysm (AAA) Screening:        General: Screening Not Indicated      Lung Cancer Screening:     General: Screening Not Indicated      Hepatitis C Screening:    General: Screening Current      Julio Mora DO

## 2021-01-22 NOTE — TELEPHONE ENCOUNTER
----- Message from AmyViry S Hien Collins sent at 1/22/2021 11:07 AM EST -----  Regarding: DM eye exam [facility] [phone]  01/22/21 11:08 AM    Hello, our patient Vivian Baldwin has had Diabetic Eye Exam completed/performed  Please assist in updating the patient chart by making an External outreach to [facility] located in [place]  The date of service is December 2020      Thank you,  Virginia TREVIZO

## 2021-01-22 NOTE — PROGRESS NOTES
Assessment/Plan:    No problem-specific Assessment & Plan notes found for this encounter  Diagnoses and all orders for this visit:    Medicare annual wellness visit, subsequent    Type 2 diabetes mellitus without complication, without long-term current use of insulin ([facility] [address] )  Comments:  no change in the medication  Orders:  -     Glucose, fasting; Future  -     Hemoglobin A1C; Future    Essential hypertension  Comments:  no change in the medication    Osteoporosis, unspecified osteoporosis type, unspecified pathological fracture presence  Comments:  no change in medication    Screening for diabetic retinopathy  -     Ambulatory referral to Ophthalmology; Future    Overweight (BMI 25 0-29  9)    Negative depression screening    Hypertriglyceridemia  Comments:  pt counseled on diet and exercise          PHQ-9 Depression Screening    PHQ-9:   Frequency of the following problems over the past two weeks:      Little interest or pleasure in doing things: 0 - not at all  Feeling down, depressed, or hopeless: 1 - several days  PHQ-2 Score: 1        BMI Counseling: Body mass index is 25 49 kg/m²  The BMI is above normal  Nutrition recommendations include reducing portion sizes and 3-5 servings of fruits/vegetables daily  Exercise recommendations include moderate aerobic physical activity for 150 minutes/week and exercising 3-5 times per week  Subjective:      Patient ID: Simon Holman is a 76 y o  female  Follow up HTN, cholesterol    Diabetes  She presents for her follow-up diabetic visit  She has type 2 diabetes mellitus  Her disease course has been stable  There are no hypoglycemic associated symptoms  Pertinent negatives for hypoglycemia include no seizures  There are no diabetic associated symptoms  Pertinent negatives for diabetes include no chest pain and no fatigue  There are no hypoglycemic complications  Symptoms are stable  There are no diabetic complications   Risk factors for coronary artery disease include sedentary lifestyle and post-menopausal  Current diabetic treatment includes oral agent (monotherapy)  She is compliant with treatment most of the time  Her weight is stable  She is following a diabetic diet  Her overall blood glucose range is 130-140 mg/dl  An ACE inhibitor/angiotensin II receptor blocker is being taken  The following portions of the patient's history were reviewed and updated as appropriate: allergies, current medications, past family history, past medical history, past social history, past surgical history and problem list     Review of Systems   Constitutional: Negative for chills, fatigue and fever  HENT: Negative  Eyes: Negative  Respiratory: Negative for shortness of breath and wheezing  Cardiovascular: Negative for chest pain and palpitations  Gastrointestinal: Negative for abdominal pain, blood in stool, constipation, diarrhea, nausea and vomiting  Endocrine: Negative  Genitourinary: Negative for difficulty urinating and dysuria  Musculoskeletal: Negative for arthralgias and myalgias  Skin: Negative  Allergic/Immunologic: Negative  Neurological: Negative for seizures and syncope  Hematological: Negative for adenopathy  Psychiatric/Behavioral: Negative  Patient's shoes and socks removed  Right Foot/Ankle   Right Foot Inspection  Skin Exam: skin normal and skin intact no dry skin, no warmth, no callus, no erythema, no maceration, no abnormal color, no pre-ulcer, no ulcer and no callus                          Toe Exam: ROM and strength within normal limits no right toe deformity  Sensory       Monofilament testing: intact  Vascular  Capillary refills: < 3 seconds  The right DP pulse is 2+       Left Foot/Ankle  Left Foot Inspection  Skin Exam: skin normal and skin intactno dry skin, no warmth, no erythema, no maceration, normal color, no pre-ulcer, no ulcer and no callus                         Toe Exam: ROM and strength within normal limitsno left toe deformity                   Sensory       Monofilament: intact  Vascular  Capillary refills: < 3 seconds  The left DP pulse is 2+  Assign Risk Category:  No deformity present; No loss of protective sensation; No weak pulses       Risk: 0    Objective:    /70   Pulse 71   Temp 97 7 °F (36 5 °C) (Tympanic)   Ht 4' 11" (1 499 m)   Wt 57 2 kg (126 lb 3 2 oz)   SpO2 95%   BMI 25 49 kg/m²      Physical Exam  Vitals signs and nursing note reviewed  Constitutional:       General: She is not in acute distress  Appearance: Normal appearance  She is well-developed  She is not ill-appearing, toxic-appearing or diaphoretic  HENT:      Head: Normocephalic and atraumatic  Right Ear: Tympanic membrane, ear canal and external ear normal  There is no impacted cerumen  Left Ear: Tympanic membrane, ear canal and external ear normal  There is no impacted cerumen  Nose: Nose normal  No congestion or rhinorrhea  Mouth/Throat:      Mouth: Mucous membranes are moist       Pharynx: Oropharynx is clear  No oropharyngeal exudate or posterior oropharyngeal erythema  Eyes:      General:         Right eye: No discharge  Left eye: No discharge  Conjunctiva/sclera: Conjunctivae normal       Pupils: Pupils are equal, round, and reactive to light  Neck:      Musculoskeletal: Normal range of motion and neck supple  No neck rigidity  Cardiovascular:      Rate and Rhythm: Normal rate and regular rhythm  Pulses: Normal pulses  no weak pulses          Dorsalis pedis pulses are 2+ on the right side and 2+ on the left side  Heart sounds: Normal heart sounds  No murmur  Pulmonary:      Effort: Pulmonary effort is normal  No respiratory distress  Breath sounds: Normal breath sounds  No wheezing, rhonchi or rales  Abdominal:      General: Bowel sounds are normal  There is no distension  Palpations: Abdomen is soft  There is no mass  Tenderness:  There is no abdominal tenderness  There is no guarding or rebound  Musculoskeletal: Normal range of motion  Right lower leg: No edema  Left lower leg: No edema  Feet:      Right foot:      Skin integrity: No ulcer, skin breakdown, erythema, warmth, callus or dry skin  Left foot:      Skin integrity: No ulcer, skin breakdown, erythema, warmth, callus or dry skin  Lymphadenopathy:      Cervical: No cervical adenopathy  Skin:     General: Skin is warm and dry  Findings: No rash  Neurological:      General: No focal deficit present  Mental Status: She is alert and oriented to person, place, and time  Sensory: No sensory deficit  Motor: No weakness or abnormal muscle tone  Coordination: Coordination normal       Gait: Gait normal       Deep Tendon Reflexes: Reflexes are normal and symmetric  Psychiatric:         Mood and Affect: Mood normal          Behavior: Behavior normal          Thought Content:  Thought content normal          Judgment: Judgment normal

## 2021-01-22 NOTE — TELEPHONE ENCOUNTER
Upon review of the In Basket request and the patient's chart, initial outreach has been made via fax, please see Contacts section for details       Thank you  Christa Garduno

## 2021-01-28 ENCOUNTER — IMMUNIZATIONS (OUTPATIENT)
Dept: FAMILY MEDICINE CLINIC | Facility: HOSPITAL | Age: 76
End: 2021-01-28

## 2021-01-28 DIAGNOSIS — Z23 ENCOUNTER FOR IMMUNIZATION: Primary | ICD-10-CM

## 2021-01-28 PROCEDURE — 0011A SARS-COV-2 / COVID-19 MRNA VACCINE (MODERNA) 100 MCG: CPT

## 2021-01-28 PROCEDURE — 91301 SARS-COV-2 / COVID-19 MRNA VACCINE (MODERNA) 100 MCG: CPT

## 2021-02-01 NOTE — TELEPHONE ENCOUNTER
Upon review of the In Basket request we were able to locate, review, and update the patient chart as requested for Diabetic Eye Exam     Any additional questions or concerns should be emailed to the Practice Liaisons via [email or web address]  org email, please do not reply via In Basket      Thank you  Shannon Amador

## 2021-02-05 DIAGNOSIS — F32.A DEPRESSION, UNSPECIFIED DEPRESSION TYPE: ICD-10-CM

## 2021-02-05 DIAGNOSIS — I10 ESSENTIAL HYPERTENSION: ICD-10-CM

## 2021-02-05 PROCEDURE — 4010F ACE/ARB THERAPY RXD/TAKEN: CPT | Performed by: FAMILY MEDICINE

## 2021-02-05 RX ORDER — IRBESARTAN 150 MG/1
TABLET ORAL
Qty: 90 TABLET | Refills: 0 | Status: SHIPPED | OUTPATIENT
Start: 2021-02-05 | End: 2021-05-13

## 2021-02-05 RX ORDER — BUPROPION HYDROCHLORIDE 150 MG/1
TABLET ORAL
Qty: 30 TABLET | Refills: 0 | Status: SHIPPED | OUTPATIENT
Start: 2021-02-05 | End: 2021-03-15

## 2021-02-15 DIAGNOSIS — I10 ESSENTIAL HYPERTENSION: ICD-10-CM

## 2021-03-08 ENCOUNTER — IMMUNIZATIONS (OUTPATIENT)
Dept: FAMILY MEDICINE CLINIC | Facility: HOSPITAL | Age: 76
End: 2021-03-08

## 2021-03-08 DIAGNOSIS — Z23 ENCOUNTER FOR IMMUNIZATION: Primary | ICD-10-CM

## 2021-03-08 PROCEDURE — 91301 SARS-COV-2 / COVID-19 MRNA VACCINE (MODERNA) 100 MCG: CPT

## 2021-03-08 PROCEDURE — 0012A SARS-COV-2 / COVID-19 MRNA VACCINE (MODERNA) 100 MCG: CPT

## 2021-03-14 DIAGNOSIS — F32.A DEPRESSION, UNSPECIFIED DEPRESSION TYPE: ICD-10-CM

## 2021-03-15 RX ORDER — BUPROPION HYDROCHLORIDE 150 MG/1
TABLET ORAL
Qty: 30 TABLET | Refills: 0 | Status: SHIPPED | OUTPATIENT
Start: 2021-03-15 | End: 2021-03-19

## 2021-03-19 DIAGNOSIS — F32.A DEPRESSION, UNSPECIFIED DEPRESSION TYPE: ICD-10-CM

## 2021-03-19 RX ORDER — BUPROPION HYDROCHLORIDE 150 MG/1
TABLET ORAL
Qty: 30 TABLET | Refills: 0 | Status: SHIPPED | OUTPATIENT
Start: 2021-03-19 | End: 2021-03-22 | Stop reason: SDUPTHER

## 2021-03-22 DIAGNOSIS — F32.A DEPRESSION, UNSPECIFIED DEPRESSION TYPE: ICD-10-CM

## 2021-03-22 RX ORDER — BUPROPION HYDROCHLORIDE 150 MG/1
150 TABLET ORAL DAILY
Qty: 90 TABLET | Refills: 3 | Status: SHIPPED | OUTPATIENT
Start: 2021-03-22 | End: 2021-04-19

## 2021-04-15 DIAGNOSIS — E11.9 TYPE 2 DIABETES MELLITUS WITHOUT COMPLICATION, WITHOUT LONG-TERM CURRENT USE OF INSULIN (HCC): ICD-10-CM

## 2021-04-15 RX ORDER — LANCETS 33 GAUGE
EACH MISCELLANEOUS
Qty: 100 EACH | Refills: 0 | Status: SHIPPED | OUTPATIENT
Start: 2021-04-15 | End: 2021-11-23

## 2021-04-15 RX ORDER — BLOOD SUGAR DIAGNOSTIC
STRIP MISCELLANEOUS
Qty: 100 EACH | Refills: 0 | Status: SHIPPED | OUTPATIENT
Start: 2021-04-15 | End: 2021-11-23

## 2021-04-18 DIAGNOSIS — F32.A DEPRESSION, UNSPECIFIED DEPRESSION TYPE: ICD-10-CM

## 2021-04-19 RX ORDER — BUPROPION HYDROCHLORIDE 150 MG/1
150 TABLET ORAL DAILY
Qty: 90 TABLET | Refills: 3 | Status: SHIPPED | OUTPATIENT
Start: 2021-04-19 | End: 2022-03-22

## 2021-04-19 RX ORDER — BUPROPION HYDROCHLORIDE 150 MG/1
TABLET ORAL
Qty: 30 TABLET | Refills: 0 | Status: SHIPPED | OUTPATIENT
Start: 2021-04-19 | End: 2021-04-19 | Stop reason: SDUPTHER

## 2021-04-21 ENCOUNTER — APPOINTMENT (OUTPATIENT)
Dept: LAB | Facility: CLINIC | Age: 76
End: 2021-04-21
Payer: COMMERCIAL

## 2021-04-21 DIAGNOSIS — E11.9 TYPE 2 DIABETES MELLITUS WITHOUT COMPLICATION, WITHOUT LONG-TERM CURRENT USE OF INSULIN (HCC): ICD-10-CM

## 2021-04-21 LAB
EST. AVERAGE GLUCOSE BLD GHB EST-MCNC: 128 MG/DL
GLUCOSE P FAST SERPL-MCNC: 143 MG/DL (ref 65–99)
HBA1C MFR BLD: 6.1 %

## 2021-04-21 PROCEDURE — 83036 HEMOGLOBIN GLYCOSYLATED A1C: CPT

## 2021-04-21 PROCEDURE — 3044F HG A1C LEVEL LT 7.0%: CPT | Performed by: FAMILY MEDICINE

## 2021-04-21 PROCEDURE — 82947 ASSAY GLUCOSE BLOOD QUANT: CPT

## 2021-04-21 PROCEDURE — 36415 COLL VENOUS BLD VENIPUNCTURE: CPT

## 2021-04-28 DIAGNOSIS — E11.9 TYPE 2 DIABETES MELLITUS WITHOUT COMPLICATION, WITHOUT LONG-TERM CURRENT USE OF INSULIN (HCC): ICD-10-CM

## 2021-04-29 ENCOUNTER — OFFICE VISIT (OUTPATIENT)
Dept: FAMILY MEDICINE CLINIC | Facility: CLINIC | Age: 76
End: 2021-04-29
Payer: COMMERCIAL

## 2021-04-29 VITALS
DIASTOLIC BLOOD PRESSURE: 60 MMHG | WEIGHT: 127 LBS | TEMPERATURE: 98.3 F | OXYGEN SATURATION: 95 % | HEIGHT: 59 IN | HEART RATE: 66 BPM | SYSTOLIC BLOOD PRESSURE: 132 MMHG | BODY MASS INDEX: 25.6 KG/M2

## 2021-04-29 DIAGNOSIS — E11.9 TYPE 2 DIABETES MELLITUS WITHOUT COMPLICATION, WITHOUT LONG-TERM CURRENT USE OF INSULIN (HCC): Primary | ICD-10-CM

## 2021-04-29 DIAGNOSIS — I10 ESSENTIAL HYPERTENSION: ICD-10-CM

## 2021-04-29 DIAGNOSIS — T78.40XA ALLERGY, INITIAL ENCOUNTER: ICD-10-CM

## 2021-04-29 PROCEDURE — 3078F DIAST BP <80 MM HG: CPT | Performed by: FAMILY MEDICINE

## 2021-04-29 PROCEDURE — 1036F TOBACCO NON-USER: CPT | Performed by: FAMILY MEDICINE

## 2021-04-29 PROCEDURE — 3075F SYST BP GE 130 - 139MM HG: CPT | Performed by: FAMILY MEDICINE

## 2021-04-29 PROCEDURE — 1160F RVW MEDS BY RX/DR IN RCRD: CPT | Performed by: FAMILY MEDICINE

## 2021-04-29 PROCEDURE — 3725F SCREEN DEPRESSION PERFORMED: CPT | Performed by: FAMILY MEDICINE

## 2021-04-29 PROCEDURE — 99214 OFFICE O/P EST MOD 30 MIN: CPT | Performed by: FAMILY MEDICINE

## 2021-04-29 RX ORDER — AZELASTINE HYDROCHLORIDE 0.5 MG/ML
1 SOLUTION/ DROPS OPHTHALMIC 2 TIMES DAILY
Qty: 6 ML | Refills: 1 | Status: SHIPPED | OUTPATIENT
Start: 2021-04-29 | End: 2022-08-04

## 2021-04-29 RX ORDER — LORATADINE 10 MG/1
10 TABLET ORAL DAILY
Qty: 30 TABLET | Refills: 0 | Status: SHIPPED | OUTPATIENT
Start: 2021-04-29 | End: 2021-06-03

## 2021-04-29 NOTE — PATIENT INSTRUCTIONS
Take claritin one tablet in the morning every day    Take 1 drop of the eye medicine twice a day in each eye   This medicine can be used for 8 weeks

## 2021-04-29 NOTE — PROGRESS NOTES
Subjective:     Patient ID: Amrik Reyna is a 68 y o  female  Assessment/Plan:      Diagnoses and all orders for this visit:    Type 2 diabetes mellitus without complication, without long-term current use of insulin ([facility])  -     Hemoglobin A1c (w/out EAG); Future  -     Glucose, fasting; Future    Essential hypertension  Comments:  continue ARB and BB    Allergy, initial encounter  -     azelastine (OPTIVAR) 0 05 % ophthalmic solution; Administer 1 drop to both eyes 2 (two) times a day  -     loratadine (CLARITIN) 10 mg tablet; Take 1 tablet (10 mg total) by mouth daily              The following portions of the patient's history were reviewed and updated as appropriate: allergies, current medications, past family history, past medical history, past social history, past surgical history, and problem list      HPI      Allergies: Has always had allergies but reports have never been this significantly bothersome to her  She reports for last couple weeks she has been worse  Head feels full and PND  Runny nose, congestion, eyes are itching and burning  Has tried zyrtec, allegra, xyzal, benadryl and did not find relief  She uses otc flonase which she has continued  She reports she was allergy tested and knows mold is a allergen but no other knoen triggers for her  Denies any sick contacts at home  No fevers, chills, diarrhea, abdo pain, vomiting, cough  Denies chest pain or shortness of breath in the chest  She does report trouble breathing when her mouth is closed through the nose  Diabetes Mellitus Type II, Follow-up: Patient here for follow-up of Type 2 diabetes mellitus  Diabetes:  Glucose control:  Lab Results   Component Value Date    HGBA1C 6 1 (H) 04/21/2021         -Checks sugars: Morning fasting   -Average readings: 124 average   Highest is 142   -Hyper/hypoglycemia symptoms:  Denies shakiness, lightheadedness, dizziness, racing heart, or other signs of low blood sugar  Medications:   -Orals: metformin 500 mg once daily   -Insulin: none   -ACEi or ARB: irbesartan daily   -Statin: simvastatin 40 mg daily  Neuropathy:   -Symptoms: No numbness, tingling   -Last foot exam: August 2020, negative  Diet:   -Low carb diet: tries to   -Counting carbs: none  Exercise:   -Designated exercise plan: walks the dog, 20 minutes 7 days a week     Ophthalmology:   -Last visit: December has cataract, no retinopathy per patient  Podiatry:   -Last visit: none  Immunizations:   -Pneumovax: 8/23/2010   -Prevnar: 11/9/2014  Tobacco use:   -no   -Interest in quitting? na    No results found for: [employer] Results   Component Value Date    HGBA1C 6 1 (H) 04/21/2021       Creatinine   Date Value Ref Range Status   12/29/2020 0 83 0 60 - 1 30 mg/dL Final     Comment:     Standardized to IDMS reference method     Calcium   Date Value Ref Range Status   12/29/2020 9 5 8 3 - 10 1 mg/dL Final     Potassium   Date Value Ref Range Status   12/29/2020 4 0 3 5 - 5 3 mmol/L Final     Chloride   Date Value Ref Range Status   12/29/2020 109 (H) 100 - 108 mmol/L Final     CO2   Date Value Ref Range Status   12/29/2020 29 21 - 32 mmol/L Final       Review of Systems   Constitutional: Negative for chills and fever  HENT: Positive for congestion, postnasal drip and rhinorrhea  Negative for ear pain, sinus pressure, sinus pain and sore throat  Eyes: Positive for itching  Negative for visual disturbance  Respiratory: Negative for cough, shortness of breath and wheezing  Cardiovascular: Negative for chest pain, palpitations and leg swelling  Gastrointestinal: Negative for abdominal pain, constipation, diarrhea, nausea and vomiting  Genitourinary: Negative for dysuria and hematuria  Musculoskeletal: Negative for arthralgias and myalgias  Skin: Negative for rash  Neurological: Negative for weakness, light-headedness and headaches  Objective:     Physical Exam  Vitals signs and nursing note reviewed  Constitutional:       General: She is not in acute distress  Appearance: Normal appearance  She is not ill-appearing, toxic-appearing or diaphoretic  HENT:      Head: Normocephalic and atraumatic  Right Ear: Hearing, ear canal and external ear normal  A middle ear effusion is present  Tympanic membrane is scarred  Left Ear: Hearing, ear canal and external ear normal  A middle ear effusion is present  Nose: Congestion and rhinorrhea present  Right Turbinates: Pale  Left Turbinates: Pale  Right Sinus: No maxillary sinus tenderness or frontal sinus tenderness  Left Sinus: No maxillary sinus tenderness or frontal sinus tenderness  Mouth/Throat:      Mouth: Mucous membranes are moist       Pharynx: Oropharynx is clear  No oropharyngeal exudate  Tonsils: No tonsillar exudate  Eyes:      General: Allergic shiner present  No scleral icterus  Right eye: No discharge  Left eye: No discharge  Extraocular Movements: Extraocular movements intact  Conjunctiva/sclera: Conjunctivae normal       Pupils: Pupils are equal, round, and reactive to light  Cardiovascular:      Rate and Rhythm: Normal rate and regular rhythm  Heart sounds: Normal heart sounds  No murmur  No friction rub  No gallop  Pulmonary:      Effort: Pulmonary effort is normal  No respiratory distress  Breath sounds: Normal breath sounds  No wheezing or rales  Abdominal:      General: Bowel sounds are normal       Palpations: Abdomen is soft  Tenderness: There is no abdominal tenderness  Lymphadenopathy:      Cervical:      Right cervical: No superficial or deep cervical adenopathy  Left cervical: No superficial or deep cervical adenopathy  Neurological:      Mental Status: She is alert

## 2021-05-02 DIAGNOSIS — M79.643 PAIN OF HAND, UNSPECIFIED LATERALITY: ICD-10-CM

## 2021-05-05 RX ORDER — TRAZODONE HYDROCHLORIDE 100 MG/1
TABLET ORAL
Qty: 90 TABLET | Refills: 0 | Status: SHIPPED | OUTPATIENT
Start: 2021-05-05 | End: 2021-08-02

## 2021-05-13 DIAGNOSIS — I10 ESSENTIAL HYPERTENSION: ICD-10-CM

## 2021-05-13 RX ORDER — IRBESARTAN 150 MG/1
TABLET ORAL
Qty: 90 TABLET | Refills: 0 | Status: SHIPPED | OUTPATIENT
Start: 2021-05-13 | End: 2021-05-17

## 2021-05-16 DIAGNOSIS — I10 ESSENTIAL HYPERTENSION: ICD-10-CM

## 2021-05-17 PROCEDURE — 4010F ACE/ARB THERAPY RXD/TAKEN: CPT | Performed by: FAMILY MEDICINE

## 2021-05-17 RX ORDER — IRBESARTAN 150 MG/1
TABLET ORAL
Qty: 90 TABLET | Refills: 0 | Status: SHIPPED | OUTPATIENT
Start: 2021-05-17 | End: 2021-11-15

## 2021-06-03 DIAGNOSIS — T78.40XA ALLERGY, INITIAL ENCOUNTER: ICD-10-CM

## 2021-06-03 RX ORDER — BENZOYL PEROXIDE
KIT TOPICAL
Qty: 30 TABLET | Refills: 0 | Status: SHIPPED | OUTPATIENT
Start: 2021-06-03 | End: 2021-08-12

## 2021-06-27 DIAGNOSIS — E78.00 HYPERCHOLESTEROLEMIA: ICD-10-CM

## 2021-06-29 RX ORDER — SIMVASTATIN 40 MG
TABLET ORAL
Qty: 90 TABLET | Refills: 0 | Status: SHIPPED | OUTPATIENT
Start: 2021-06-29 | End: 2021-09-20

## 2021-07-23 DIAGNOSIS — M81.0 OSTEOPOROSIS, UNSPECIFIED OSTEOPOROSIS TYPE, UNSPECIFIED PATHOLOGICAL FRACTURE PRESENCE: ICD-10-CM

## 2021-07-23 RX ORDER — ALENDRONATE SODIUM 70 MG/1
70 TABLET ORAL WEEKLY
Qty: 12 TABLET | Refills: 3 | Status: SHIPPED | OUTPATIENT
Start: 2021-07-23 | End: 2021-07-26 | Stop reason: SDUPTHER

## 2021-07-26 DIAGNOSIS — M81.0 OSTEOPOROSIS, UNSPECIFIED OSTEOPOROSIS TYPE, UNSPECIFIED PATHOLOGICAL FRACTURE PRESENCE: ICD-10-CM

## 2021-07-27 RX ORDER — ALENDRONATE SODIUM 70 MG/1
70 TABLET ORAL WEEKLY
Qty: 12 TABLET | Refills: 3 | Status: SHIPPED | OUTPATIENT
Start: 2021-07-27

## 2021-08-01 DIAGNOSIS — M79.643 PAIN OF HAND, UNSPECIFIED LATERALITY: ICD-10-CM

## 2021-08-02 ENCOUNTER — APPOINTMENT (OUTPATIENT)
Dept: LAB | Facility: CLINIC | Age: 76
End: 2021-08-02
Payer: COMMERCIAL

## 2021-08-02 DIAGNOSIS — E11.9 TYPE 2 DIABETES MELLITUS WITHOUT COMPLICATION, WITHOUT LONG-TERM CURRENT USE OF INSULIN (HCC): Primary | ICD-10-CM

## 2021-08-02 LAB
EST. AVERAGE GLUCOSE BLD GHB EST-MCNC: 128 MG/DL
GLUCOSE P FAST SERPL-MCNC: 134 MG/DL (ref 65–99)
HBA1C MFR BLD: 6.1 %

## 2021-08-02 PROCEDURE — 83036 HEMOGLOBIN GLYCOSYLATED A1C: CPT

## 2021-08-02 PROCEDURE — 36415 COLL VENOUS BLD VENIPUNCTURE: CPT

## 2021-08-02 PROCEDURE — 3044F HG A1C LEVEL LT 7.0%: CPT | Performed by: FAMILY MEDICINE

## 2021-08-02 PROCEDURE — 82947 ASSAY GLUCOSE BLOOD QUANT: CPT

## 2021-08-02 RX ORDER — TRAZODONE HYDROCHLORIDE 100 MG/1
TABLET ORAL
Qty: 90 TABLET | Refills: 0 | Status: SHIPPED | OUTPATIENT
Start: 2021-08-02 | End: 2021-11-23

## 2021-08-05 ENCOUNTER — OFFICE VISIT (OUTPATIENT)
Dept: FAMILY MEDICINE CLINIC | Facility: CLINIC | Age: 76
End: 2021-08-05
Payer: COMMERCIAL

## 2021-08-05 VITALS
TEMPERATURE: 97.4 F | BODY MASS INDEX: 25.2 KG/M2 | DIASTOLIC BLOOD PRESSURE: 60 MMHG | HEIGHT: 59 IN | OXYGEN SATURATION: 96 % | HEART RATE: 89 BPM | WEIGHT: 125 LBS | SYSTOLIC BLOOD PRESSURE: 120 MMHG

## 2021-08-05 DIAGNOSIS — E11.9 TYPE 2 DIABETES MELLITUS WITHOUT COMPLICATION, WITHOUT LONG-TERM CURRENT USE OF INSULIN (HCC): ICD-10-CM

## 2021-08-05 DIAGNOSIS — J06.9 UPPER RESPIRATORY INFECTION, ACUTE: Primary | ICD-10-CM

## 2021-08-05 DIAGNOSIS — H91.93 BILATERAL HEARING LOSS, UNSPECIFIED HEARING LOSS TYPE: ICD-10-CM

## 2021-08-05 PROCEDURE — 99214 OFFICE O/P EST MOD 30 MIN: CPT | Performed by: FAMILY MEDICINE

## 2021-08-05 PROCEDURE — 1160F RVW MEDS BY RX/DR IN RCRD: CPT | Performed by: FAMILY MEDICINE

## 2021-08-05 PROCEDURE — 3078F DIAST BP <80 MM HG: CPT | Performed by: FAMILY MEDICINE

## 2021-08-05 PROCEDURE — 3074F SYST BP LT 130 MM HG: CPT | Performed by: FAMILY MEDICINE

## 2021-08-05 PROCEDURE — 1036F TOBACCO NON-USER: CPT | Performed by: FAMILY MEDICINE

## 2021-08-05 PROCEDURE — 3725F SCREEN DEPRESSION PERFORMED: CPT | Performed by: FAMILY MEDICINE

## 2021-08-05 RX ORDER — BENZONATATE 100 MG/1
100 CAPSULE ORAL 3 TIMES DAILY PRN
Qty: 20 CAPSULE | Refills: 0 | Status: SHIPPED | OUTPATIENT
Start: 2021-08-05 | End: 2021-11-23

## 2021-08-05 NOTE — PROGRESS NOTES
Subjective:     Patient ID: Shari Nelson is a 68 y o  female  Assessment/Plan:      Diagnoses and all orders for this visit:    Upper respiratory infection, acute  -     sodium chloride (OCEAN) 0 65 % nasal spray; 1 spray into each nostril as needed for congestion or rhinitis  -     benzonatate (TESSALON PERLES) 100 mg capsule; Take 1 capsule (100 mg total) by mouth 3 (three) times a day as needed for cough    Bilateral hearing loss, unspecified hearing loss type  Comments: At baseline over 3-4 years  No other neurologic symptoms  Does have B/l effusion but unlikely to have caused the persistent hearing loss she admits to  Orders:  -     Audiogram screen; Future    Type 2 diabetes mellitus without complication, without long-term current use of insulin ([facility])  Comments:  Stable at 6 1  Continue metformin  Orders:  -     HEMOGLOBIN A1C W/ EAG ESTIMATION; Future  -     Glucose, fasting; Future          BMI Counseling: There is no height or weight on file to calculate BMI  The BMI is above normal  Nutrition recommendations include encouraging healthy choices of fruits and vegetables, decreasing fast food intake and consuming healthier snacks  Exercise recommendations include moderate physical activity 150 minutes/week, exercising 3-5 times per week and strength training exercises  The following portions of the patient's history were reviewed and updated as appropriate: allergies, current medications, past family history, past medical history, past social history, past surgical history, and problem list      HPI      Allergies: Has always had allergies but reports have never been this significantly bothersome to her  She reports for last couple weeks she has been worse  Head feels full and PND  Runny nose, congestion, eyes are itching and burning  Has tried zyrtec, allegra, xyzal, benadryl and did not find relief   We started her on claritin and flonase at a previous visit which had been providing relief until the last 2 weeks  She reports she was allergy tested and knows mold is a allergen but no other known triggers for her  Denies any sick contacts at home  No fevers, chills, diarrhea, abdo pain, vomiting, cough  Denies chest pain or shortness of breath in the chest     Hearing loss bilaterally: 3-4 years  No tinnitus, recurrent infections, discharge  Has not had hearing tested previously  Diabetes Mellitus Type II, Follow-up: Patient here for follow-up of Type 2 diabetes mellitus  Diabetes:  Glucose control:  Lab Results   Component Value Date    HGBA1C 6 1 (H) 08/02/2021      Lab Results   Component Value Date    HGBA1C 6 1 (H) 08/02/2021    HGBA1C 6 1 (H) 04/21/2021    HGBA1C 6 0 (H) 12/29/2020     Lab Results   Component Value Date    GLUF 134 (H) 08/02/2021    LDLCALC 49 12/29/2020    CREATININE 0 83 12/29/2020      -Checks sugars: Morning fasting   -Average readings: 124 average   Highest is 142   -Hyper/hypoglycemia symptoms:  Denies shakiness, lightheadedness, dizziness, racing heart, or other signs of low blood sugar  Medications:   -Orals: metformin 500 mg once daily   -Insulin: none   -ACEi or ARB: irbesartan daily   -Statin: simvastatin 40 mg daily  Neuropathy:   -Symptoms: No numbness, tingling   -Last foot exam: August 2020, negative  Diet:   -Low carb diet: tries to   -Counting carbs: none  Exercise:   -Designated exercise plan: walks the dog, 20 minutes 7 days a week     Ophthalmology:   -Last visit: December has cataract, no retinopathy per patient  Podiatry:   -Last visit: none  Immunizations:   -Pneumovax: 8/23/2010   -Prevnar: 11/9/2014  Tobacco use:   -no   -Interest in quitting? na    No results found for: Gee Levine     Lab Results   Component Value Date    HGBA1C 6 1 (H) 08/02/2021       Creatinine   Date Value Ref Range Status   12/29/2020 0 83 0 60 - 1 30 mg/dL Final     Comment:     Standardized to IDMS reference method     Calcium   Date Value Ref Range Status 12/29/2020 9 5 8 3 - 10 1 mg/dL Final     Potassium   Date Value Ref Range Status   12/29/2020 4 0 3 5 - 5 3 mmol/L Final     Chloride   Date Value Ref Range Status   12/29/2020 109 (H) 100 - 108 mmol/L Final     CO2   Date Value Ref Range Status   12/29/2020 29 21 - 32 mmol/L Final       Review of Systems   Constitutional: Negative for chills and fever  HENT: Positive for congestion, postnasal drip and rhinorrhea  Negative for ear pain, sinus pressure, sinus pain and sore throat  Eyes: Positive for itching  Negative for visual disturbance  Respiratory: Negative for cough, shortness of breath and wheezing  Cardiovascular: Negative for chest pain, palpitations and leg swelling  Gastrointestinal: Negative for abdominal pain, constipation, diarrhea, nausea and vomiting  Genitourinary: Negative for dysuria and hematuria  Musculoskeletal: Negative for arthralgias and myalgias  Skin: Negative for rash  Neurological: Negative for weakness, light-headedness and headaches  Objective:     Physical Exam  Vitals and nursing note reviewed  Constitutional:       General: She is not in acute distress  Appearance: Normal appearance  She is not ill-appearing, toxic-appearing or diaphoretic  HENT:      Head: Normocephalic and atraumatic  Right Ear: Hearing, ear canal and external ear normal  A middle ear effusion is present  Tympanic membrane is scarred  Left Ear: Hearing, ear canal and external ear normal  A middle ear effusion is present  Nose: Congestion and rhinorrhea present  Right Turbinates: Pale  Left Turbinates: Pale  Right Sinus: No maxillary sinus tenderness or frontal sinus tenderness  Left Sinus: No maxillary sinus tenderness or frontal sinus tenderness  Mouth/Throat:      Mouth: Mucous membranes are moist       Pharynx: Oropharynx is clear  No oropharyngeal exudate  Tonsils: No tonsillar exudate     Eyes:      General: Allergic shiner present  No scleral icterus  Right eye: No discharge  Left eye: No discharge  Extraocular Movements: Extraocular movements intact  Conjunctiva/sclera: Conjunctivae normal       Pupils: Pupils are equal, round, and reactive to light  Cardiovascular:      Rate and Rhythm: Normal rate and regular rhythm  Heart sounds: Normal heart sounds  No murmur heard  No friction rub  No gallop  Pulmonary:      Effort: Pulmonary effort is normal  No respiratory distress  Breath sounds: Normal breath sounds  No wheezing or rales  Abdominal:      General: Bowel sounds are normal       Palpations: Abdomen is soft  Tenderness: There is no abdominal tenderness  Lymphadenopathy:      Cervical:      Right cervical: No superficial or deep cervical adenopathy  Left cervical: No superficial or deep cervical adenopathy  Neurological:      Mental Status: She is alert

## 2021-08-05 NOTE — PATIENT INSTRUCTIONS
Cold Symptoms, Ambulatory Care   GENERAL INFORMATION:   Cold symptoms  include sneezing, dry throat, a stuffy nose, headache, watery eyes, and a cough  Your cough may be dry, or you may cough up mucus  You may also have muscle aches, joint pain, and tiredness  Rarely, you may have a fever  Cold symptoms occur from inflammation in your upper respiratory system caused by a virus  Most colds go away without treatment  Seek immediate care for the following symptoms:   · A heartbeat that is much faster than usual for you     · A swollen neck that is sore to the touch     · Increased tiredness and weakness    · Pinpoint or larger reddish-purple dots on your skin     · Poor or no appetite  Treatment for cold symptoms  may include NSAIDS to decrease muscle aches and fever  Do not give NSAID medicines to children under 10months of age without direction from your child's doctor  Cold medicines may also be given to decrease coughing, nasal stuffiness, sneezing, and a runny nose  Do not give cold medicines to children under 11years of age without direction from your child's doctor  Manage your cold symptoms with the following:   · Drink liquids  to help thin and loosen thick mucus so you can cough it up  Liquids will also keep you hydrated  Ask your healthcare provider which liquids are best for you and how much to drink each day  · Do not smoke  because it may worsen your symptoms and increase the length of time you feel sick  Talk with your healthcare provider if you need help to stop smoking  Prevent the spread of germs  by washing your hands often  You can spread your cold germs to others for at least 3 days after your symptoms start  Do not share items, such as eating utensils  Cover your nose and mouth when you cough or sneeze using the crook of your elbow instead of your hands  Throw used tissues in the garbage    Follow up with your healthcare provider as directed:  Write down your questions so you remember to ask them during your visits  CARE AGREEMENT:   You have the right to help plan your care  Learn about your health condition and how it may be treated  Discuss treatment options with your caregivers to decide what care you want to receive  You always have the right to refuse treatment  The above information is an  only  It is not intended as medical advice for individual conditions or treatments  Talk to your doctor, nurse or pharmacist before following any medical regimen to see if it is safe and effective for you  © 2014 [address] is for End User's use only and may not be sold, redistributed or otherwise used for commercial purposes  All illustrations and images included in CareNotes® are the copyrighted property of A D A M [employer]  or Darryl Mora

## 2021-08-13 DIAGNOSIS — E11.9 TYPE 2 DIABETES MELLITUS WITHOUT COMPLICATION, WITHOUT LONG-TERM CURRENT USE OF INSULIN (HCC): ICD-10-CM

## 2021-08-15 DIAGNOSIS — I10 ESSENTIAL HYPERTENSION: ICD-10-CM

## 2021-08-22 DIAGNOSIS — I10 ESSENTIAL HYPERTENSION: ICD-10-CM

## 2021-08-30 ENCOUNTER — OFFICE VISIT (OUTPATIENT)
Dept: AUDIOLOGY | Age: 76
End: 2021-08-30
Payer: COMMERCIAL

## 2021-08-30 DIAGNOSIS — H90.3 SENSORY HEARING LOSS, BILATERAL: Primary | ICD-10-CM

## 2021-08-30 PROCEDURE — 92567 TYMPANOMETRY: CPT | Performed by: AUDIOLOGIST

## 2021-08-30 PROCEDURE — 92557 COMPREHENSIVE HEARING TEST: CPT | Performed by: AUDIOLOGIST

## 2021-08-30 NOTE — PROGRESS NOTES
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)  EVALUATION:    Otoscopic Evaluation:   Right Ear: Clear and healthy ear canal and tympanic membrane   Left Ear: Clear and healthy ear canal and tympanic membrane    Tympanometry:   Right: Type B (large ear canal volume) - possibly perforated eardrum - no perforation visualized  Left: Type A - normal middle ear pressure and compliance    Audiogram Results:  Pure tone testing revealed a mild sloping to modeartely severe sensorineural hearing loss in the  left  ear and a mild sloping to severe mixed hearing loss in the  right ear  SRT and PTA are in agreement indicating good test reliability  Word recognition scores were good bilaterally  *see attached audiogram      RECOMMENDATIONS:  Annual hearing eval, Return to [facility]  for F/U, Hearing Aid Evaluation and Copy to Patient/Caregiver    PATIENT EDUCATION:   Discussed results and recommendations with patient  Questions were addressed and the patient was encouraged to contact our department should concerns arise        Tala Mcclendon , [facility]  Clinical Audiologist

## 2021-09-13 ENCOUNTER — OFFICE VISIT (OUTPATIENT)
Dept: AUDIOLOGY | Age: 76
End: 2021-09-13

## 2021-09-13 DIAGNOSIS — H90.3 SENSORY HEARING LOSS, BILATERAL: Primary | ICD-10-CM

## 2021-09-13 NOTE — PROGRESS NOTES
Progress Note    Name:  Rosamaria Manning  :  1945  Age:  68 y o  Date of Evaluation: 21     Patient was scheduled for a hearing aid evaluation, however, it was determined that the patient has a hearing aid benefit through her insurance with [employer]  Patient was informed that we are not contracted with [employer] and if she whishes to pursue that benefit she would have to go elsewhere to obtain hearing aids  She was also informed that she can stay with [facility] and purchase the hearing aids and use a payment plan for the remaining cost  Patient decided to investigate her [employer] aid benefit         Tala Arciniega , [facility]  Clinical Audiologist

## 2021-09-14 ENCOUNTER — APPOINTMENT (EMERGENCY)
Dept: RADIOLOGY | Facility: HOSPITAL | Age: 76
End: 2021-09-14
Payer: COMMERCIAL

## 2021-09-14 ENCOUNTER — HOSPITAL ENCOUNTER (EMERGENCY)
Facility: HOSPITAL | Age: 76
Discharge: HOME | End: 2021-09-14
Attending: EMERGENCY MEDICINE
Payer: COMMERCIAL

## 2021-09-14 ENCOUNTER — TELEPHONE (OUTPATIENT)
Dept: FAMILY MEDICINE CLINIC | Facility: CLINIC | Age: 76
End: 2021-09-14

## 2021-09-14 VITALS
HEART RATE: 80 BPM | RESPIRATION RATE: 16 BRPM | OXYGEN SATURATION: 95 % | WEIGHT: 127 LBS | HEIGHT: 59 IN | BODY MASS INDEX: 25.6 KG/M2 | DIASTOLIC BLOOD PRESSURE: 71 MMHG | SYSTOLIC BLOOD PRESSURE: 122 MMHG

## 2021-09-14 DIAGNOSIS — R07.89 CHEST PRESSURE: Primary | ICD-10-CM

## 2021-09-14 DIAGNOSIS — R06.02 SHORTNESS OF BREATH: ICD-10-CM

## 2021-09-14 LAB
ANION GAP SERPL CALCULATED.3IONS-SCNC: 5 MMOL/L (ref 4–13)
BASOPHILS # BLD AUTO: 0 THOUSANDS/ΜL (ref 0–0.1)
BASOPHILS NFR BLD AUTO: 0 % (ref 0–2)
BUN SERPL-MCNC: 15 MG/DL (ref 7–25)
CALCIUM SERPL-MCNC: 9.1 MG/DL (ref 8.6–10.5)
CHLORIDE SERPL-SCNC: 103 MMOL/L (ref 98–107)
CO2 SERPL-SCNC: 29 MMOL/L (ref 21–31)
CREAT SERPL-MCNC: 0.85 MG/DL (ref 0.6–1.2)
EOSINOPHIL # BLD AUTO: 0.3 THOUSAND/ΜL (ref 0–0.61)
EOSINOPHIL NFR BLD AUTO: 4 % (ref 0–5)
ERYTHROCYTE [DISTWIDTH] IN BLOOD BY AUTOMATED COUNT: 13.9 % (ref 11.5–14.5)
GFR SERPL CREATININE-BSD FRML MDRD: 67 ML/MIN/1.73SQ M
GLUCOSE SERPL-MCNC: 153 MG/DL (ref 65–99)
HCT VFR BLD AUTO: 40.4 % (ref 42–47)
HGB BLD-MCNC: 13.3 G/DL (ref 12–16)
LYMPHOCYTES # BLD AUTO: 1.2 THOUSANDS/ΜL (ref 0.6–4.47)
LYMPHOCYTES NFR BLD AUTO: 18 % (ref 21–51)
MCH RBC QN AUTO: 30.7 PG (ref 26–34)
MCHC RBC AUTO-ENTMCNC: 33 G/DL (ref 31–37)
MCV RBC AUTO: 93 FL (ref 81–99)
MONOCYTES # BLD AUTO: 0.9 THOUSAND/ΜL (ref 0.17–1.22)
MONOCYTES NFR BLD AUTO: 13 % (ref 2–12)
NEUTROPHILS # BLD AUTO: 4.4 THOUSANDS/ΜL (ref 1.4–6.5)
NEUTS SEG NFR BLD AUTO: 65 % (ref 42–75)
PLATELET # BLD AUTO: 197 THOUSANDS/UL (ref 149–390)
PMV BLD AUTO: 7.9 FL (ref 8.6–11.7)
POTASSIUM SERPL-SCNC: 3.8 MMOL/L (ref 3.5–5.5)
RBC # BLD AUTO: 4.34 MILLION/UL (ref 3.9–5.2)
SARS-COV-2 RNA RESP QL NAA+PROBE: NEGATIVE
SODIUM SERPL-SCNC: 137 MMOL/L (ref 134–143)
TROPONIN I SERPL-MCNC: <0.03 NG/ML
WBC # BLD AUTO: 6.8 THOUSAND/UL (ref 4.8–10.8)

## 2021-09-14 PROCEDURE — U0003 INFECTIOUS AGENT DETECTION BY NUCLEIC ACID (DNA OR RNA); SEVERE ACUTE RESPIRATORY SYNDROME CORONAVIRUS 2 (SARS-COV-2) (CORONAVIRUS DISEASE [COVID-19]), AMPLIFIED PROBE TECHNIQUE, MAKING USE OF HIGH THROUGHPUT TECHNOLOGIES AS DESCRIBED BY CMS-2020-01-R: HCPCS | Performed by: PHYSICIAN ASSISTANT

## 2021-09-14 PROCEDURE — 84484 ASSAY OF TROPONIN QUANT: CPT | Performed by: PHYSICIAN ASSISTANT

## 2021-09-14 PROCEDURE — 99284 EMERGENCY DEPT VISIT MOD MDM: CPT | Performed by: PHYSICIAN ASSISTANT

## 2021-09-14 PROCEDURE — 71045 X-RAY EXAM CHEST 1 VIEW: CPT

## 2021-09-14 PROCEDURE — 94640 AIRWAY INHALATION TREATMENT: CPT

## 2021-09-14 PROCEDURE — 93005 ELECTROCARDIOGRAM TRACING: CPT

## 2021-09-14 PROCEDURE — 36415 COLL VENOUS BLD VENIPUNCTURE: CPT | Performed by: PHYSICIAN ASSISTANT

## 2021-09-14 PROCEDURE — 85025 COMPLETE CBC W/AUTO DIFF WBC: CPT | Performed by: PHYSICIAN ASSISTANT

## 2021-09-14 PROCEDURE — 99285 EMERGENCY DEPT VISIT HI MDM: CPT

## 2021-09-14 PROCEDURE — 80048 BASIC METABOLIC PNL TOTAL CA: CPT | Performed by: PHYSICIAN ASSISTANT

## 2021-09-14 PROCEDURE — U0005 INFEC AGEN DETEC AMPLI PROBE: HCPCS | Performed by: PHYSICIAN ASSISTANT

## 2021-09-14 RX ORDER — SODIUM CHLORIDE 9 MG/ML
3 INJECTION INTRAVENOUS
Status: DISCONTINUED | OUTPATIENT
Start: 2021-09-14 | End: 2021-09-14 | Stop reason: HOSPADM

## 2021-09-14 RX ORDER — ALBUTEROL SULFATE 2.5 MG/3ML
5 SOLUTION RESPIRATORY (INHALATION) ONCE
Status: COMPLETED | OUTPATIENT
Start: 2021-09-14 | End: 2021-09-14

## 2021-09-14 RX ORDER — ASPIRIN 81 MG/1
324 TABLET, CHEWABLE ORAL ONCE
Status: COMPLETED | OUTPATIENT
Start: 2021-09-14 | End: 2021-09-14

## 2021-09-14 RX ORDER — ALBUTEROL SULFATE 90 UG/1
2 AEROSOL, METERED RESPIRATORY (INHALATION) ONCE
Status: COMPLETED | OUTPATIENT
Start: 2021-09-14 | End: 2021-09-14

## 2021-09-14 RX ADMIN — IPRATROPIUM BROMIDE 0.5 MG: 0.5 SOLUTION RESPIRATORY (INHALATION) at 13:46

## 2021-09-14 RX ADMIN — ALBUTEROL SULFATE 2 PUFF: 90 AEROSOL, METERED RESPIRATORY (INHALATION) at 14:59

## 2021-09-14 RX ADMIN — ALBUTEROL SULFATE 5 MG: 2.5 SOLUTION RESPIRATORY (INHALATION) at 13:46

## 2021-09-14 RX ADMIN — ASPIRIN 81 MG CHEWABLE TABLET 324 MG: 81 TABLET CHEWABLE at 11:43

## 2021-09-14 NOTE — TELEPHONE ENCOUNTER
Heri espinoza stating she is not feeling well  She stated she has a cough, and shortness of breath and pressure on her chest  I advised her to go to the er because of the shortness of breath and the chest pressure  She understood  She stated since she got the [facility] shot she has not been feeling well

## 2021-09-15 NOTE — ED PROVIDER NOTES
tHistory  Chief Complaint   Patient presents with    Chest Pain     worsening over the last week, patient reports pain feels like a pressure    Shortness of Breath     80-year-old female presents to the emergency department seeking evaluation for some shortness of breath and chest pressure that has been present for the last week  She is overall well-appearing in no acute distress  She denies any nausea vomiting weakness fatigue headaches diarrhea abdominal pain  No known sick exposures  She denies cough congestion or sputum production  Allergies reviewed          Prior to Admission Medications   Prescriptions Last Dose Informant Patient Reported? Taking?    OneTouch Delica Lancets 90R MISC  Self No No   Sig: USE 1  TO CHECK GLUCOSE DAILY   OneTouch Ultra test strip  Self No No   Sig: USE 1 STRIP TO CHECK GLUCOSE ONCE DAILY   alendronate (FOSAMAX) 70 mg tablet   No No   Sig: Take 1 tablet (70 mg total) by mouth once a week   azelastine (OPTIVAR) 0 05 % ophthalmic solution   No No   Sig: Administer 1 drop to both eyes 2 (two) times a day   benzonatate (TESSALON PERLES) 100 mg capsule   No No   Sig: Take 1 capsule (100 mg total) by mouth 3 (three) times a day as needed for cough   buPROPion (WELLBUTRIN XL) 150 mg 24 hr tablet  Self No No   Sig: Take 1 tablet (150 mg total) by mouth daily   irbesartan (AVAPRO) 150 mg tablet   No No   Sig: Take 1 tablet by mouth once daily   loratadine (EQ Allergy Relief) 10 mg tablet   No No   Sig: Take 1 tablet (10 mg total) by mouth daily   metFORMIN (GLUCOPHAGE) 500 mg tablet   No No   Sig: Take 1 tablet (500 mg total) by mouth 2 (two) times a day   metoprolol tartrate (LOPRESSOR) 25 mg tablet   No No   Sig: Take 1 tablet by mouth once daily   simvastatin (ZOCOR) 40 mg tablet   No No   Sig: Take 1 tablet by mouth once daily   sodium chloride (OCEAN) 0 65 % nasal spray   No No   Si spray into each nostril as needed for congestion or rhinitis   traZODone (DESYREL) 100 mg tablet   No No   Sig: TAKE 1 TABLET BY MOUTH ONCE DAILY AT BEDTIME      Facility-Administered Medications: None       Past Medical History:   Diagnosis Date    Diabetes ([facility] [address] )     Hx of benign breast biopsy 10/31/2018    Left breast mammogram and ultrasound 10/10/2018   Hyperlipidemia        Past Surgical History:   Procedure Laterality Date    BREAST BIOPSY Left 1990    benign per patient report    BREAST BIOPSY Left 11/13/2018    benign   NO CLIP    BREAST SURGERY Left 1990    biopsy on left breast    CHOLECYSTECTOMY      HERNIA REPAIR      HYSTERECTOMY  04/15/2016    US GUIDED BREAST BIOPSY LEFT COMPLETE Left 11/13/2018       Family History   Problem Relation Age of Onset    Heart failure Mother     Diabetes Father     No Known Problems Maternal Grandmother     No Known Problems Maternal Grandfather     No Known Problems Paternal Grandmother     No Known Problems Paternal Grandfather     No Known Problems Maternal Aunt     No Known Problems Maternal Aunt     No Known Problems Paternal Aunt     No Known Problems Paternal Aunt     No Known Problems Paternal Aunt     No Known Problems Paternal Aunt      I have reviewed and agree with the history as documented  E-Cigarette/Vaping     E-Cigarette/Vaping Substances     Social History     Tobacco Use    Smoking status: Never Smoker    Smokeless tobacco: Never Used   Substance Use Topics    Alcohol use: No    Drug use: No       Review of Systems   Constitutional: Negative for chills, fatigue and fever  HENT: Negative for congestion, ear pain, rhinorrhea, sinus pressure, sneezing, sore throat and trouble swallowing  Eyes: Negative for discharge and itching  Respiratory: Positive for chest tightness and shortness of breath  Negative for cough, wheezing and stridor  Cardiovascular: Negative for chest pain and palpitations  Gastrointestinal: Negative for abdominal pain, diarrhea, nausea and vomiting     Neurological: Negative for dizziness, syncope, numbness and headaches  All other systems reviewed and are negative  Physical Exam  Physical Exam  Vitals and nursing note reviewed  Constitutional:       General: She is not in acute distress  Appearance: She is well-developed  She is not ill-appearing or diaphoretic  HENT:      Head: Normocephalic and atraumatic  Right Ear: External ear normal       Left Ear: External ear normal       Nose: Nose normal    Eyes:      Conjunctiva/sclera: Conjunctivae normal       Pupils: Pupils are equal, round, and reactive to light  Cardiovascular:      Rate and Rhythm: Normal rate and regular rhythm  Heart sounds: Normal heart sounds  No murmur heard  No friction rub  No gallop  Pulmonary:      Effort: Pulmonary effort is normal  No respiratory distress  Breath sounds: Normal breath sounds  No stridor  No wheezing or rales  Abdominal:      General: Bowel sounds are normal  There is no distension  Palpations: Abdomen is soft  Tenderness: There is no abdominal tenderness  There is no guarding  Musculoskeletal:         General: No tenderness  Normal range of motion  Cervical back: Normal range of motion  Skin:     General: Skin is warm  Capillary Refill: Capillary refill takes less than 2 seconds  Neurological:      Mental Status: She is alert and oriented to person, place, and time           Vital Signs  ED Triage Vitals [09/14/21 1057]   Temp Pulse Respirations Blood Pressure SpO2   -- 68 20 137/62 98 %      Temp src Heart Rate Source Patient Position - Orthostatic VS BP Location FiO2 (%)   -- Monitor Sitting Left arm --      Pain Score       2           Vitals:    09/14/21 1057 09/14/21 1330 09/14/21 1459   BP: 137/62 137/61 122/71   Pulse: 68 60 80   Patient Position - Orthostatic VS: Sitting  Sitting         Visual Acuity      ED Medications  Medications   aspirin chewable tablet 324 mg (324 mg Oral Given 9/14/21 1143)   ipratropium (ATROVENT) 0 02 % inhalation solution 0 5 mg (0 5 mg Nebulization Given 9/14/21 1346)   albuterol inhalation solution 5 mg (5 mg Nebulization Given 9/14/21 1346)   albuterol (PROVENTIL HFA,VENTOLIN HFA) inhaler 2 puff (2 puffs Inhalation Given 9/14/21 1459)       Diagnostic Studies  Results Reviewed     Procedure Component Value Units Date/Time    Novel Coronavirus [facility] [[id number]]  (Normal) Collected: 09/14/21 1119    Lab Status: Final result Specimen: Nares from Nasopharyngeal Swab Updated: 09/14/21 1228     SARS-CoV-2 Negative    Narrative: The specimen collection materials, transport medium, and/or testing methodology utilized in the production of these test results have been proven to be reliable in a limited validation with an abbreviated program under the Emergency Utilization Authorization provided by the FDA  Testing reported as "Presumptive positive" will be confirmed with secondary testing to ensure result accuracy  Clinical caution and judgement should be used with the interpretation of these results with consideration of the clinical impression and other laboratory testing  Testing reported as "Positive" or "Negative" has been proven to be accurate according to standard laboratory validation requirements  All testing is performed with control materials showing appropriate reactivity at standard intervals        Troponin I [[id number]]  (Normal) Collected: 09/14/21 1119    Lab Status: Final result Specimen: Blood from Arm, Right Updated: 09/14/21 1153     Troponin I <0 03 ng/mL     Basic metabolic panel [[id number]]  (Abnormal) Collected: 09/14/21 1119    Lab Status: Final result Specimen: Blood from Arm, Right Updated: 09/14/21 1151     Sodium 137 mmol/L      Potassium 3 8 mmol/L      Chloride 103 mmol/L      CO2 29 mmol/L      ANION GAP 5 mmol/L      BUN 15 mg/dL      Creatinine 0 85 mg/dL      Glucose 153 mg/dL      Calcium 9 1 mg/dL      eGFR 67 ml/min/1 73sq m     Narrative:      [employer] guidelines for Chronic Kidney Disease (CKD):     Stage 1 with normal or high GFR (GFR > 90 mL/min/1 73 square meters)    Stage 2 Mild CKD (GFR = 60-89 mL/min/1 73 square meters)    Stage 3A Moderate CKD (GFR = 45-59 mL/min/1 73 square meters)    Stage 3B Moderate CKD (GFR = 30-44 mL/min/1 73 square meters)    Stage 4 Severe CKD (GFR = 15-29 mL/min/1 73 square meters)    Stage 5 End Stage CKD (GFR <15 mL/min/1 73 square meters)  Note: GFR calculation is accurate only with a steady state creatinine    CBC and differential [[id number]]  (Abnormal) Collected: 09/14/21 1119    Lab Status: Final result Specimen: Blood from Arm, Right Updated: 09/14/21 1135     WBC 6 80 Thousand/uL      RBC 4 34 Million/uL      Hemoglobin 13 3 g/dL      Hematocrit 40 4 %      MCV 93 fL      MCH 30 7 pg      MCHC 33 0 g/dL      RDW 13 9 %      MPV 7 9 fL      Platelets 819 Thousands/uL      Neutrophils Relative 65 %      Lymphocytes Relative 18 %      Monocytes Relative 13 %      Eosinophils Relative 4 %      Basophils Relative 0 %      Neutrophils Absolute 4 40 Thousands/µL      Lymphocytes Absolute 1 20 Thousands/µL      Monocytes Absolute 0 90 Thousand/µL      Eosinophils Absolute 0 30 Thousand/µL      Basophils Absolute 0 00 Thousands/µL                  X-ray chest 1 view portable   Final Result by Margie Wheeler MD (09/14 1301)      No acute cardiopulmonary disease  Workstation performed: [id number]                    Procedures  Procedures         ED Course  ED Course as of Sep 15 1738   Tue Sep 14, 2021   1449 Feeling improved after DuoNeb                                              MDM  Number of Diagnoses or Management Options  Chest pressure  Shortness of breath  Diagnosis management comments: Generally benign physical examination  Patient reports feeling improved with treatment in the emergency department  EKG unremarkable    Patient educated regarding their diagnosis and given return and follow-up instructions  Patient was advised to returned to the ED with worsening symptoms or concerns  Patient is understanding of and in agreement with the treatment plan  There are no questions at the time of discharge  Amount and/or Complexity of Data Reviewed  Clinical lab tests: reviewed and ordered  Tests in the radiology section of CPT®: reviewed and ordered    Risk of Complications, Morbidity, and/or Mortality  Presenting problems: moderate  Diagnostic procedures: low  Management options: low    Patient Progress  Patient progress: improved      Disposition  Final diagnoses:   Chest pressure   Shortness of breath     Time reflects when diagnosis was documented in both MDM as applicable and the Disposition within this note     Time User Action Codes Description Comment    9/14/2021  2:49 PM Diantha Staggers Add [R07 89] Chest pressure     9/14/2021  2:49 PM Diantha Staggers Add [R06 02] Shortness of breath       ED Disposition     ED Disposition Condition Date/Time Comment    Discharge Stable Tue Sep 14, 2021  2:49 PM Keya Cason discharge to home/self care  Follow-up Information     Follow up With Specialties Details Why Manuel Hernandez MD Family Medicine   [address] [place] [address]  [phone]            Discharge Medication List as of 9/14/2021  3:00 PM      CONTINUE these medications which have NOT CHANGED    Details   alendronate (FOSAMAX) 70 mg tablet Take 1 tablet (70 mg total) by mouth once a week, Starting Tue 7/27/2021, Normal      azelastine (OPTIVAR) 0 05 % ophthalmic solution Administer 1 drop to both eyes 2 (two) times a day, Starting Thu 4/29/2021, Until Mon 6/28/2021, Normal      benzonatate (TESSALON PERLES) 100 mg capsule Take 1 capsule (100 mg total) by mouth 3 (three) times a day as needed for cough, Starting Thu 8/5/2021, Normal      buPROPion (WELLBUTRIN XL) 150 mg 24 hr tablet Take 1 tablet (150 mg total) by mouth daily, Starting Mon 4/19/2021, Normal      irbesartan (AVAPRO) 150 mg tablet Take 1 tablet by mouth once daily, Normal      loratadine (EQ Allergy Relief) 10 mg tablet Take 1 tablet (10 mg total) by mouth daily, Starting Thu 8/12/2021, Until Mon 5/9/2022, Normal      metFORMIN (GLUCOPHAGE) 500 mg tablet Take 1 tablet (500 mg total) by mouth 2 (two) times a day, Starting Fri 8/13/2021, Normal      metoprolol tartrate (LOPRESSOR) 25 mg tablet Take 1 tablet by mouth once daily, Normal      OneTouch Delica Lancets 52A MISC USE 1  TO CHECK GLUCOSE DAILY, Normal      OneTouch Ultra test strip USE 1 STRIP TO CHECK GLUCOSE ONCE DAILY, Normal      simvastatin (ZOCOR) 40 mg tablet Take 1 tablet by mouth once daily, Normal      sodium chloride (OCEAN) 0 65 % nasal spray 1 spray into each nostril as needed for congestion or rhinitis, Starting Thu 8/5/2021, Normal      traZODone (DESYREL) 100 mg tablet TAKE 1 TABLET BY MOUTH ONCE DAILY AT BEDTIME, Normal           No discharge procedures on file      PDMP Review     None          ED Provider  Electronically Signed by           Grace Mock PA-C  09/15/21 5015

## 2021-09-15 NOTE — PROGRESS NOTES
Assessment/Plan:      Diagnoses and all orders for this visit:    Chest pressure  Comments:  follow up ED visit  No abnormalities in CXR, labs  ekg showed 1st degress block  She has no wheezing or decreased breath sounds  use albuterol prn  2/2 allergy? Type 2 diabetes mellitus without complication, without long-term current use of insulin (HCC)  Comments:  glucose elevated on non fasting BMP  adherent to metformin 500 BID  last hba1c was 6  1  advised to continue watching her diet to limit carbs          Return for Next scheduled follow up  The following portions of the patient's history were reviewed and updated as appropriate: allergies, current medications, past family history, past medical history, past social history, past surgical history, and problem list      Subjective:     Patient ID: Julian Ball is a 68 y o  female  HPI    Went to the ED yesterday for chest pressure/sob  Covid testing was negative  Troponin x 1 was negative  BMP was normal except elevated glucose  CBC was without elevated wbc  CXR was normal  EKG showed prolonger OR interval consistent with 1st degree heart block  She reports the chest pressure is still there  However, it is so much better than yesterday  They have given her albuterol inhaler which she used 8 times yesterday and 3 times today  She denies any significant shortness of breath or chest pain  Denies fevers, chills, congestion, runny nose  Denies ever having had this feeling previously  Tolerating the medicine well       PHQ-9 Follow-up            Current Outpatient Medications on File Prior to Visit   Medication Sig Dispense Refill    alendronate (FOSAMAX) 70 mg tablet Take 1 tablet (70 mg total) by mouth once a week 12 tablet 3    azelastine (OPTIVAR) 0 05 % ophthalmic solution Administer 1 drop to both eyes 2 (two) times a day 6 mL 1    benzonatate (TESSALON PERLES) 100 mg capsule Take 1 capsule (100 mg total) by mouth 3 (three) times a day as needed for cough 20 capsule 0    buPROPion (WELLBUTRIN XL) 150 mg 24 hr tablet Take 1 tablet (150 mg total) by mouth daily 90 tablet 3    irbesartan (AVAPRO) 150 mg tablet Take 1 tablet by mouth once daily 90 tablet 0    loratadine (EQ Allergy Relief) 10 mg tablet Take 1 tablet (10 mg total) by mouth daily 90 tablet 2    metFORMIN (GLUCOPHAGE) 500 mg tablet Take 1 tablet (500 mg total) by mouth 2 (two) times a day 180 tablet 0    metoprolol tartrate (LOPRESSOR) 25 mg tablet Take 1 tablet by mouth once daily 90 tablet 0    OneTouch Delica Lancets 44Z MISC USE 1  TO CHECK GLUCOSE DAILY 100 each 0    OneTouch Ultra test strip USE 1 STRIP TO CHECK GLUCOSE ONCE DAILY 100 each 0    simvastatin (ZOCOR) 40 mg tablet Take 1 tablet by mouth once daily 90 tablet 0    sodium chloride (OCEAN) 0 65 % nasal spray 1 spray into each nostril as needed for congestion or rhinitis 88 mL 1    traZODone (DESYREL) 100 mg tablet TAKE 1 TABLET BY MOUTH ONCE DAILY AT BEDTIME 90 tablet 0     No current facility-administered medications on file prior to visit  Review of Systems      Objective:    Vitals:    09/16/21 1124   BP: 128/72   BP Location: Left arm   Patient Position: Sitting   Cuff Size: Standard   Pulse: 61   Temp: 97 7 °F (36 5 °C)   TempSrc: Tympanic   SpO2: 97%   Weight: 59 kg (130 lb)   Height: 4' 11" (1 499 m)         Physical Exam  Vitals and nursing note reviewed  Constitutional:       General: She is not in acute distress  Appearance: Normal appearance  She is not ill-appearing or toxic-appearing  HENT:      Head: Normocephalic and atraumatic  Right Ear: Hearing, tympanic membrane, ear canal and external ear normal       Left Ear: Hearing, tympanic membrane, ear canal and external ear normal       Nose:      Right Sinus: No maxillary sinus tenderness or frontal sinus tenderness  Left Sinus: No maxillary sinus tenderness or frontal sinus tenderness        Mouth/Throat:      Mouth: Mucous membranes are moist       Pharynx: Oropharynx is clear  No oropharyngeal exudate  Eyes:      General: No scleral icterus  Right eye: No discharge  Left eye: No discharge  Extraocular Movements: Extraocular movements intact  Conjunctiva/sclera: Conjunctivae normal       Pupils: Pupils are equal, round, and reactive to light  Cardiovascular:      Rate and Rhythm: Normal rate and regular rhythm  Heart sounds: Normal heart sounds  No murmur heard  No friction rub  No gallop  Pulmonary:      Effort: Pulmonary effort is normal  No respiratory distress  Breath sounds: Normal breath sounds  No wheezing or rales  Lymphadenopathy:      Cervical:      Right cervical: No superficial or deep cervical adenopathy  Left cervical: No superficial or deep cervical adenopathy  Neurological:      Mental Status: She is alert

## 2021-09-16 ENCOUNTER — OFFICE VISIT (OUTPATIENT)
Dept: FAMILY MEDICINE CLINIC | Facility: CLINIC | Age: 76
End: 2021-09-16
Payer: COMMERCIAL

## 2021-09-16 VITALS
WEIGHT: 130 LBS | OXYGEN SATURATION: 97 % | HEART RATE: 61 BPM | HEIGHT: 59 IN | DIASTOLIC BLOOD PRESSURE: 72 MMHG | TEMPERATURE: 97.7 F | BODY MASS INDEX: 26.21 KG/M2 | SYSTOLIC BLOOD PRESSURE: 128 MMHG

## 2021-09-16 DIAGNOSIS — E11.9 TYPE 2 DIABETES MELLITUS WITHOUT COMPLICATION, WITHOUT LONG-TERM CURRENT USE OF INSULIN (HCC): ICD-10-CM

## 2021-09-16 DIAGNOSIS — R07.89 CHEST PRESSURE: Primary | ICD-10-CM

## 2021-09-16 PROCEDURE — 1036F TOBACCO NON-USER: CPT | Performed by: FAMILY MEDICINE

## 2021-09-16 PROCEDURE — 3074F SYST BP LT 130 MM HG: CPT | Performed by: FAMILY MEDICINE

## 2021-09-16 PROCEDURE — 3078F DIAST BP <80 MM HG: CPT | Performed by: FAMILY MEDICINE

## 2021-09-16 PROCEDURE — 99214 OFFICE O/P EST MOD 30 MIN: CPT | Performed by: FAMILY MEDICINE

## 2021-09-16 PROCEDURE — 1160F RVW MEDS BY RX/DR IN RCRD: CPT | Performed by: FAMILY MEDICINE

## 2021-09-17 LAB
ATRIAL RATE: 68 BPM
P AXIS: 31 DEGREES
PR INTERVAL: 216 MS
QRS AXIS: -40 DEGREES
QRSD INTERVAL: 90 MS
QT INTERVAL: 420 MS
QTC INTERVAL: 446 MS
T WAVE AXIS: 58 DEGREES
VENTRICULAR RATE: 68 BPM

## 2021-09-17 PROCEDURE — 93010 ELECTROCARDIOGRAM REPORT: CPT | Performed by: INTERNAL MEDICINE

## 2021-09-19 DIAGNOSIS — E78.00 HYPERCHOLESTEROLEMIA: ICD-10-CM

## 2021-09-20 RX ORDER — SIMVASTATIN 40 MG
TABLET ORAL
Qty: 90 TABLET | Refills: 0 | Status: SHIPPED | OUTPATIENT
Start: 2021-09-20 | End: 2021-11-30

## 2021-10-15 ENCOUNTER — HOSPITAL ENCOUNTER (OUTPATIENT)
Dept: MAMMOGRAPHY | Facility: HOSPITAL | Age: 76
Discharge: HOME | End: 2021-10-15
Attending: SPECIALIST
Payer: COMMERCIAL

## 2021-10-15 VITALS — HEIGHT: 59 IN | WEIGHT: 130 LBS | BODY MASS INDEX: 26.21 KG/M2

## 2021-10-15 DIAGNOSIS — Z12.31 ENCOUNTER FOR SCREENING MAMMOGRAM FOR MALIGNANT NEOPLASM OF BREAST: ICD-10-CM

## 2021-10-15 PROCEDURE — 77067 SCR MAMMO BI INCL CAD: CPT

## 2021-10-15 PROCEDURE — 77063 BREAST TOMOSYNTHESIS BI: CPT

## 2021-11-01 ENCOUNTER — IMMUNIZATIONS (OUTPATIENT)
Dept: FAMILY MEDICINE CLINIC | Facility: HOSPITAL | Age: 76
End: 2021-11-01

## 2021-11-01 ENCOUNTER — OFFICE VISIT (OUTPATIENT)
Dept: URGENT CARE | Facility: CLINIC | Age: 76
End: 2021-11-01
Payer: COMMERCIAL

## 2021-11-01 VITALS
OXYGEN SATURATION: 96 % | DIASTOLIC BLOOD PRESSURE: 60 MMHG | WEIGHT: 130 LBS | BODY MASS INDEX: 26.26 KG/M2 | TEMPERATURE: 98 F | HEART RATE: 91 BPM | SYSTOLIC BLOOD PRESSURE: 123 MMHG | RESPIRATION RATE: 20 BRPM

## 2021-11-01 DIAGNOSIS — N30.00 ACUTE CYSTITIS WITHOUT HEMATURIA: Primary | ICD-10-CM

## 2021-11-01 DIAGNOSIS — Z23 ENCOUNTER FOR IMMUNIZATION: Primary | ICD-10-CM

## 2021-11-01 DIAGNOSIS — R30.0 DYSURIA: ICD-10-CM

## 2021-11-01 LAB
SL AMB  POCT GLUCOSE, UA: ABNORMAL
SL AMB LEUKOCYTE ESTERASE,UA: ABNORMAL
SL AMB POCT BILIRUBIN,UA: ABNORMAL
SL AMB POCT BLOOD,UA: ABNORMAL
SL AMB POCT CLARITY,UA: ABNORMAL
SL AMB POCT COLOR,UA: YELLOW
SL AMB POCT KETONES,UA: ABNORMAL
SL AMB POCT NITRITE,UA: ABNORMAL
SL AMB POCT PH,UA: 7.5
SL AMB POCT SPECIFIC GRAVITY,UA: 1020
SL AMB POCT URINE PROTEIN: ABNORMAL
SL AMB POCT UROBILINOGEN: 0.2

## 2021-11-01 PROCEDURE — 99214 OFFICE O/P EST MOD 30 MIN: CPT | Performed by: NURSE PRACTITIONER

## 2021-11-01 PROCEDURE — 87086 URINE CULTURE/COLONY COUNT: CPT | Performed by: NURSE PRACTITIONER

## 2021-11-01 PROCEDURE — 81002 URINALYSIS NONAUTO W/O SCOPE: CPT | Performed by: NURSE PRACTITIONER

## 2021-11-01 PROCEDURE — 87077 CULTURE AEROBIC IDENTIFY: CPT | Performed by: NURSE PRACTITIONER

## 2021-11-01 PROCEDURE — 87186 SC STD MICRODIL/AGAR DIL: CPT | Performed by: NURSE PRACTITIONER

## 2021-11-01 RX ORDER — CEPHALEXIN 500 MG/1
500 CAPSULE ORAL EVERY 12 HOURS SCHEDULED
Qty: 14 CAPSULE | Refills: 0 | Status: SHIPPED | OUTPATIENT
Start: 2021-11-01 | End: 2021-11-09

## 2021-11-02 ENCOUNTER — RA CDI HCC (OUTPATIENT)
Dept: OTHER | Facility: HOSPITAL | Age: 76
End: 2021-11-02

## 2021-11-03 ENCOUNTER — APPOINTMENT (OUTPATIENT)
Dept: LAB | Facility: CLINIC | Age: 76
End: 2021-11-03
Payer: COMMERCIAL

## 2021-11-03 DIAGNOSIS — E11.9 TYPE 2 DIABETES MELLITUS WITHOUT COMPLICATION, WITHOUT LONG-TERM CURRENT USE OF INSULIN (HCC): ICD-10-CM

## 2021-11-03 LAB
BACTERIA UR CULT: ABNORMAL
EST. AVERAGE GLUCOSE BLD GHB EST-MCNC: 134 MG/DL
GLUCOSE P FAST SERPL-MCNC: 147 MG/DL (ref 65–99)
HBA1C MFR BLD: 6.3 %

## 2021-11-03 PROCEDURE — 83036 HEMOGLOBIN GLYCOSYLATED A1C: CPT

## 2021-11-03 PROCEDURE — 36415 COLL VENOUS BLD VENIPUNCTURE: CPT

## 2021-11-03 PROCEDURE — 3044F HG A1C LEVEL LT 7.0%: CPT | Performed by: FAMILY MEDICINE

## 2021-11-03 PROCEDURE — 82947 ASSAY GLUCOSE BLOOD QUANT: CPT

## 2021-11-09 ENCOUNTER — OFFICE VISIT (OUTPATIENT)
Dept: FAMILY MEDICINE CLINIC | Facility: CLINIC | Age: 76
End: 2021-11-09
Payer: COMMERCIAL

## 2021-11-09 VITALS
BODY MASS INDEX: 26.21 KG/M2 | DIASTOLIC BLOOD PRESSURE: 62 MMHG | HEIGHT: 59 IN | SYSTOLIC BLOOD PRESSURE: 140 MMHG | HEART RATE: 91 BPM | OXYGEN SATURATION: 99 % | TEMPERATURE: 97.2 F | WEIGHT: 130 LBS

## 2021-11-09 DIAGNOSIS — M79.643 PAIN OF HAND, UNSPECIFIED LATERALITY: ICD-10-CM

## 2021-11-09 DIAGNOSIS — G47.00 INSOMNIA, UNSPECIFIED TYPE: ICD-10-CM

## 2021-11-09 DIAGNOSIS — F43.21 FEELING GRIEF: ICD-10-CM

## 2021-11-09 DIAGNOSIS — Z23 ENCOUNTER FOR IMMUNIZATION: ICD-10-CM

## 2021-11-09 DIAGNOSIS — E11.9 TYPE 2 DIABETES MELLITUS WITHOUT COMPLICATION, WITHOUT LONG-TERM CURRENT USE OF INSULIN (HCC): Primary | ICD-10-CM

## 2021-11-09 DIAGNOSIS — M81.0 OSTEOPOROSIS, UNSPECIFIED OSTEOPOROSIS TYPE, UNSPECIFIED PATHOLOGICAL FRACTURE PRESENCE: ICD-10-CM

## 2021-11-09 PROCEDURE — 99214 OFFICE O/P EST MOD 30 MIN: CPT | Performed by: FAMILY MEDICINE

## 2021-11-09 RX ORDER — LANOLIN ALCOHOL/MO/W.PET/CERES
3 CREAM (GRAM) TOPICAL
Qty: 30 TABLET | Refills: 0 | Status: SHIPPED | OUTPATIENT
Start: 2021-11-09 | End: 2021-12-06

## 2021-11-09 RX ORDER — TRAZODONE HYDROCHLORIDE 100 MG/1
100 TABLET ORAL
Qty: 90 TABLET | Refills: 0 | Status: CANCELLED | OUTPATIENT
Start: 2021-11-09

## 2021-11-14 DIAGNOSIS — I10 ESSENTIAL HYPERTENSION: ICD-10-CM

## 2021-11-15 PROCEDURE — 4010F ACE/ARB THERAPY RXD/TAKEN: CPT | Performed by: FAMILY MEDICINE

## 2021-11-15 RX ORDER — IRBESARTAN 150 MG/1
TABLET ORAL
Qty: 90 TABLET | Refills: 0 | Status: SHIPPED | OUTPATIENT
Start: 2021-11-15 | End: 2022-02-15

## 2021-11-18 ENCOUNTER — APPOINTMENT (OUTPATIENT)
Dept: LAB | Facility: CLINIC | Age: 76
End: 2021-11-18
Payer: COMMERCIAL

## 2021-11-18 DIAGNOSIS — M81.0 OSTEOPOROSIS, UNSPECIFIED OSTEOPOROSIS TYPE, UNSPECIFIED PATHOLOGICAL FRACTURE PRESENCE: ICD-10-CM

## 2021-11-18 LAB — 25(OH)D3 SERPL-MCNC: 54 NG/ML (ref 30–100)

## 2021-11-18 PROCEDURE — 82306 VITAMIN D 25 HYDROXY: CPT

## 2021-11-18 PROCEDURE — 36415 COLL VENOUS BLD VENIPUNCTURE: CPT

## 2021-11-23 ENCOUNTER — OFFICE VISIT (OUTPATIENT)
Dept: FAMILY MEDICINE CLINIC | Facility: CLINIC | Age: 76
End: 2021-11-23
Payer: COMMERCIAL

## 2021-11-23 VITALS
WEIGHT: 128.8 LBS | HEIGHT: 59 IN | OXYGEN SATURATION: 98 % | TEMPERATURE: 98.1 F | SYSTOLIC BLOOD PRESSURE: 138 MMHG | BODY MASS INDEX: 25.96 KG/M2 | HEART RATE: 72 BPM | DIASTOLIC BLOOD PRESSURE: 68 MMHG

## 2021-11-23 DIAGNOSIS — G47.00 INSOMNIA, UNSPECIFIED TYPE: Primary | ICD-10-CM

## 2021-11-23 DIAGNOSIS — M81.0 OSTEOPOROSIS, UNSPECIFIED OSTEOPOROSIS TYPE, UNSPECIFIED PATHOLOGICAL FRACTURE PRESENCE: ICD-10-CM

## 2021-11-23 PROCEDURE — 99213 OFFICE O/P EST LOW 20 MIN: CPT | Performed by: FAMILY MEDICINE

## 2021-11-23 PROCEDURE — 1160F RVW MEDS BY RX/DR IN RCRD: CPT | Performed by: FAMILY MEDICINE

## 2021-11-23 PROCEDURE — 3725F SCREEN DEPRESSION PERFORMED: CPT | Performed by: FAMILY MEDICINE

## 2021-11-23 PROCEDURE — 3075F SYST BP GE 130 - 139MM HG: CPT | Performed by: FAMILY MEDICINE

## 2021-11-23 PROCEDURE — 3078F DIAST BP <80 MM HG: CPT | Performed by: FAMILY MEDICINE

## 2021-11-30 DIAGNOSIS — E78.00 HYPERCHOLESTEROLEMIA: ICD-10-CM

## 2021-11-30 RX ORDER — SIMVASTATIN 40 MG
TABLET ORAL
Qty: 90 TABLET | Refills: 0 | Status: SHIPPED | OUTPATIENT
Start: 2021-11-30 | End: 2022-03-17

## 2021-12-05 DIAGNOSIS — G47.00 INSOMNIA, UNSPECIFIED TYPE: ICD-10-CM

## 2021-12-06 ENCOUNTER — TELEPHONE (OUTPATIENT)
Dept: FAMILY MEDICINE CLINIC | Facility: CLINIC | Age: 76
End: 2021-12-06

## 2021-12-06 RX ORDER — LANOLIN ALCOHOL/MO/W.PET/CERES
CREAM (GRAM) TOPICAL
Qty: 30 TABLET | Refills: 0 | Status: SHIPPED | OUTPATIENT
Start: 2021-12-06 | End: 2021-12-07

## 2021-12-07 DIAGNOSIS — G47.00 INSOMNIA, UNSPECIFIED TYPE: ICD-10-CM

## 2021-12-09 DIAGNOSIS — G47.00 INSOMNIA, UNSPECIFIED TYPE: ICD-10-CM

## 2021-12-10 RX ORDER — MELATONIN 5 MG
TABLET ORAL
Qty: 30 TABLET | Refills: 0 | Status: SHIPPED | OUTPATIENT
Start: 2021-12-10 | End: 2022-03-08 | Stop reason: ALTCHOICE

## 2021-12-20 ENCOUNTER — TELEPHONE (OUTPATIENT)
Dept: FAMILY MEDICINE CLINIC | Facility: CLINIC | Age: 76
End: 2021-12-20

## 2021-12-20 DIAGNOSIS — G47.00 INSOMNIA, UNSPECIFIED TYPE: Primary | ICD-10-CM

## 2021-12-21 RX ORDER — MIRTAZAPINE 7.5 MG/1
7.5 TABLET, FILM COATED ORAL
Qty: 30 TABLET | Refills: 0 | Status: SHIPPED | OUTPATIENT
Start: 2021-12-21 | End: 2021-12-22

## 2021-12-22 ENCOUNTER — TELEPHONE (OUTPATIENT)
Dept: FAMILY MEDICINE CLINIC | Facility: CLINIC | Age: 76
End: 2021-12-22

## 2021-12-22 DIAGNOSIS — G47.00 INSOMNIA, UNSPECIFIED TYPE: Primary | ICD-10-CM

## 2021-12-22 RX ORDER — TRAZODONE HYDROCHLORIDE 50 MG/1
25 TABLET ORAL
Qty: 30 TABLET | Refills: 0 | Status: SHIPPED | OUTPATIENT
Start: 2021-12-22 | End: 2022-02-02 | Stop reason: SDUPTHER

## 2022-01-24 ENCOUNTER — APPOINTMENT (OUTPATIENT)
Dept: LAB | Facility: CLINIC | Age: 77
End: 2022-01-24
Payer: COMMERCIAL

## 2022-01-24 DIAGNOSIS — E11.9 TYPE 2 DIABETES MELLITUS WITHOUT COMPLICATION, WITHOUT LONG-TERM CURRENT USE OF INSULIN (HCC): ICD-10-CM

## 2022-01-24 LAB
EST. AVERAGE GLUCOSE BLD GHB EST-MCNC: 137 MG/DL
HBA1C MFR BLD: 6.4 %

## 2022-01-24 PROCEDURE — 36415 COLL VENOUS BLD VENIPUNCTURE: CPT

## 2022-01-24 PROCEDURE — 83036 HEMOGLOBIN GLYCOSYLATED A1C: CPT

## 2022-02-02 ENCOUNTER — OFFICE VISIT (OUTPATIENT)
Dept: FAMILY MEDICINE CLINIC | Facility: CLINIC | Age: 77
End: 2022-02-02
Payer: COMMERCIAL

## 2022-02-02 VITALS
OXYGEN SATURATION: 98 % | DIASTOLIC BLOOD PRESSURE: 80 MMHG | TEMPERATURE: 97.5 F | BODY MASS INDEX: 25.65 KG/M2 | HEIGHT: 59 IN | SYSTOLIC BLOOD PRESSURE: 140 MMHG | HEART RATE: 74 BPM | WEIGHT: 127.25 LBS

## 2022-02-02 DIAGNOSIS — Z71.89 ADVANCED CARE PLANNING/COUNSELING DISCUSSION: ICD-10-CM

## 2022-02-02 DIAGNOSIS — R15.9 INCONTINENCE OF FECES, UNSPECIFIED FECAL INCONTINENCE TYPE: ICD-10-CM

## 2022-02-02 DIAGNOSIS — E11.9 TYPE 2 DIABETES MELLITUS WITHOUT COMPLICATION, WITHOUT LONG-TERM CURRENT USE OF INSULIN (HCC): ICD-10-CM

## 2022-02-02 DIAGNOSIS — Z23 NEED FOR SHINGLES VACCINE: ICD-10-CM

## 2022-02-02 DIAGNOSIS — Z00.00 MEDICARE ANNUAL WELLNESS VISIT, SUBSEQUENT: Primary | ICD-10-CM

## 2022-02-02 DIAGNOSIS — G47.00 INSOMNIA, UNSPECIFIED TYPE: ICD-10-CM

## 2022-02-02 DIAGNOSIS — E11.9 ENCOUNTER FOR DIABETIC FOOT EXAM (HCC): ICD-10-CM

## 2022-02-02 PROCEDURE — 1123F ACP DISCUSS/DSCN MKR DOCD: CPT | Performed by: FAMILY MEDICINE

## 2022-02-02 PROCEDURE — 99214 OFFICE O/P EST MOD 30 MIN: CPT | Performed by: FAMILY MEDICINE

## 2022-02-02 PROCEDURE — G0439 PPPS, SUBSEQ VISIT: HCPCS | Performed by: FAMILY MEDICINE

## 2022-02-02 RX ORDER — TRAZODONE HYDROCHLORIDE 50 MG/1
25 TABLET ORAL
Qty: 30 TABLET | Refills: 0 | Status: SHIPPED | OUTPATIENT
Start: 2022-02-02 | End: 2022-04-12

## 2022-02-02 RX ORDER — ZOSTER VACCINE RECOMBINANT, ADJUVANTED 50 MCG/0.5
0.5 KIT INTRAMUSCULAR ONCE
Qty: 1 EACH | Refills: 1 | Status: SHIPPED | OUTPATIENT
Start: 2022-02-02 | End: 2022-02-02

## 2022-02-02 NOTE — PROGRESS NOTES
Assessment and Plan:     Problem List Items Addressed This Visit        Other    Medicare annual wellness visit, subsequent - Primary    Insomnia    Relevant Medications    traZODone (DESYREL) 50 mg tablet      Other Visit Diagnoses     Encounter for diabetic foot exam ([place] [address] )        Relevant Orders    Diabetic foot exam    Incontinence of feces, unspecified fecal incontinence type        reports going 6-8 times a day with fecal incontinence, no blood  using imodium which is helping slightly  denies abnormal colon cancer screening  timed stool    Need for shingles vaccine        Relevant Medications    Zoster Vac Recomb Adjuvanted (Shingrix) 50 MCG/0 5ML SUSR    Advanced care planning/counseling discussion        2 sons are POA  Reports children are aware of wishes  Reports would want to die naturally  Does not want intubation, cpr, feeding tube  Lives with one her sons  Preventive health issues were discussed with patient, and age appropriate screening tests were ordered as noted in patient's After Visit Summary  Personalized health advice and appropriate referrals for health education or preventive services given if needed, as noted in patient's After Visit Summary  History of Present Illness:     Patient presents for Medicare Annual Wellness visit    Patient Care Team:  Saad Bhatti MD as PCP - General (Family Medicine)  Polo Hudson DO     Problem List:     Patient Active Problem List   Diagnosis    Type 2 diabetes mellitus without complication, without long-term current use of insulin ([place] [address] )    Overweight (BMI 25 0-29  9)    Essential hypertension    Osteoporosis    Negative depression screening    Hypertriglyceridemia    Medicare annual wellness visit, subsequent    Insomnia      Past Medical and Surgical History:     Past Medical History:   Diagnosis Date    Diabetes ([facility] )     Hx of benign breast biopsy 10/31/2018    Left breast mammogram and ultrasound 10/10/2018      Hyperlipidemia      Past Surgical History:   Procedure Laterality Date    BREAST BIOPSY Left 1990    benign per patient report    BREAST BIOPSY Left 11/13/2018    benign   NO CLIP    BREAST SURGERY Left 1990    biopsy on left breast    CHOLECYSTECTOMY      HERNIA REPAIR      HYSTERECTOMY  04/15/2016    US GUIDED BREAST BIOPSY LEFT COMPLETE Left 11/13/2018      Family History:     Family History   Problem Relation Age of Onset    Heart failure Mother     Diabetes Father     No Known Problems Maternal Grandmother     No Known Problems Maternal Grandfather     No Known Problems Paternal Grandmother     No Known Problems Paternal Grandfather     No Known Problems Maternal Aunt     No Known Problems Maternal Aunt     No Known Problems Paternal Aunt     No Known Problems Paternal Aunt     No Known Problems Paternal Aunt     No Known Problems Paternal Aunt       Social History:     Social History     Socioeconomic History    Marital status:       Spouse name: None    Number of children: 2    Years of education: None    Highest education level: None   Occupational History    Occupation: retired   Tobacco Use    Smoking status: Never Smoker    Smokeless tobacco: Never Used   Substance and Sexual Activity    Alcohol use: No    Drug use: No    Sexual activity: None   Other Topics Concern    None   Social History Narrative    None     Social Determinants of Health     Financial Resource Strain: Not on file   Food Insecurity: Not on file   Transportation Needs: Not on file   Physical Activity: Not on file   Stress: Not on file   Social Connections: Not on file   Intimate Partner Violence: Not on file   Housing Stability: Not on file      Medications and Allergies:     Current Outpatient Medications   Medication Sig Dispense Refill    alendronate (FOSAMAX) 70 mg tablet Take 1 tablet (70 mg total) by mouth once a week 12 tablet 3    buPROPion (WELLBUTRIN XL) 150 mg 24 hr tablet Take 1 tablet (150 mg total) by mouth daily 90 tablet 3    irbesartan (AVAPRO) 150 mg tablet Take 1 tablet by mouth once daily 90 tablet 0    loratadine (EQ Allergy Relief) 10 mg tablet Take 1 tablet (10 mg total) by mouth daily 90 tablet 2    metFORMIN (GLUCOPHAGE) 500 mg tablet Take 1 tablet (500 mg total) by mouth 2 (two) times a day 180 tablet 0    metoprolol tartrate (LOPRESSOR) 25 mg tablet Take 1 tablet by mouth once daily 90 tablet 0    simvastatin (ZOCOR) 40 mg tablet Take 1 tablet by mouth once daily 90 tablet 0    sodium chloride (OCEAN) 0 65 % nasal spray 1 spray into each nostril as needed for congestion or rhinitis 88 mL 1    traZODone (DESYREL) 50 mg tablet Take 0 5 tablets (25 mg total) by mouth daily at bedtime as needed for sleep 30 tablet 0    azelastine (OPTIVAR) 0 05 % ophthalmic solution Administer 1 drop to both eyes 2 (two) times a day 6 mL 1    SV Melatonin 5 MG TABS TAKE 1 TABLET BY MOUTH ONCE DAILY AT BEDTIME AS NEEDED FOR  INSOMNIA 30 tablet 0    Zoster Vac Recomb Adjuvanted (Shingrix) 50 MCG/0 5ML SUSR Inject 0 5 mL into a muscle once for 1 dose Repeat dose in 2 to 6 months 1 each 1     No current facility-administered medications for this visit       No Known Allergies   Immunizations:     Immunization History   Administered Date(s) Administered    COVID-19 MODERNA VACC 0 25 ML IM BOOSTER 11/01/2021    COVID-19 MODERNA VACC 0 5 ML IM 01/28/2021, 03/08/2021    INFLUENZA 09/25/2013, 09/07/2018, 09/15/2021    Influenza Split High Dose Preservative Free IM 11/09/2017    Influenza, high dose seasonal 0 7 mL 09/07/2018, 10/24/2019, 10/07/2020    Influenza, seasonal, injectable 10/20/2010, 01/20/2012, 09/18/2012, 10/28/2014, 09/16/2016    Pneumococcal Conjugate 13-Valent 11/09/2017    Pneumococcal Polysaccharide PPV23 08/23/2010    Tdap 06/04/2020      Health Maintenance:         Topic Date Due    Breast Cancer Screening: Mammogram  10/15/2023    Hepatitis C Screening  Completed There are no preventive care reminders to display for this patient  Medicare Health Risk Assessment:     /80 (BP Location: Left arm, Patient Position: Sitting)   Pulse 74   Temp 97 5 °F (36 4 °C)   Ht 4' 11" (1 499 m)   Wt 57 7 kg (127 lb 4 oz)   SpO2 98%   BMI 25 70 kg/m²      Angelito Higginbotham is here for her Subsequent Wellness visit  Last Medicare Wellness visit information reviewed, patient interviewed and updates made to the record today  Health Risk Assessment:   Patient rates overall health as good  Patient feels that their physical health rating is same  Patient is satisfied with their life  Eyesight was rated as much worse  Hearing was rated as much worse  Patient feels that their emotional and mental health rating is slightly worse  Patients states they are sometimes angry  Patient states they are often unusually tired/fatigued  Pain experienced in the last 7 days has been some  Patient's pain rating has been 3/10  Patient states that she has experienced weight loss or gain in last 6 months  Depression Screening:   PHQ-2 Score: 2      Fall Risk Screening: In the past year, patient has experienced: no history of falling in past year      Urinary Incontinence Screening:   Patient has not leaked urine accidently in the last six months  Home Safety:  Patient does not have trouble with stairs inside or outside of their home  Patient has working smoke alarms and has working carbon monoxide detector  Home safety hazards include: unsecured electrical cords  Nutrition:   Current diet is Regular  Medications:   Patient is currently taking over-the-counter supplements  OTC medications include: fish oil and multivitamin  Patient is able to manage medications  Activities of Daily Living (ADLs)/Instrumental Activities of Daily Living (IADLs):   Walk and transfer into and out of bed and chair?: Yes  Dress and groom yourself?: Yes    Bathe or shower yourself?: Yes    Feed yourself? Yes  Do your laundry/housekeeping?: Yes  Manage your money, pay your bills and track your expenses?: Yes  Make your own meals?: Yes    Do your own shopping?: Yes    Durable Medical Equipment Suppliers  none    Previous Hospitalizations:   Any hospitalizations or ED visits within the last 12 months?: Yes    How many hospitalizations have you had in the last year?: 1-2    Advance Care Planning:   Living will: Yes    Durable POA for healthcare: Yes    Advanced directive: Yes      Comments: Both her sons are POA  Reports children are aware of wishes  Reports would want to die naturally  Lives with one her sons  Cognitive Screening:   Provider or family/friend/caregiver concerned regarding cognition?: No    PREVENTIVE SCREENINGS      Cardiovascular Screening:    General: Screening Not Indicated and History Lipid Disorder      Diabetes Screening:     General: Screening Not Indicated and History Diabetes      Colorectal Cancer Screening:     General: Screening Current      Breast Cancer Screening:     General: Screening Current      Cervical Cancer Screening:    General: Screening Not Indicated      Osteoporosis Screening:    General: Screening Not Indicated and History Osteoporosis      Abdominal Aortic Aneurysm (AAA) Screening:        General: Screening Not Indicated      Lung Cancer Screening:     General: Screening Not Indicated      Hepatitis C Screening:    General: Screening Current    Screening, Brief Intervention, and Referral to Treatment (SBIRT)    Screening  Typical number of drinks in a day: 0  Typical number of drinks in a week: 0  Interpretation: Low risk drinking behavior      AUDIT-C Screenin) How often did you have a drink containing alcohol in the past year? never  2) How many drinks did you have on a typical day when you were drinking in the past year? 0  3) How often did you have 6 or more drinks on one occasion in the past year? never    AUDIT-C Score: 0  Interpretation: Score 0-2 (female): Negative screen for alcohol misuse    Single Item Drug Screening:  How often have you used an illegal drug (including marijuana) or a prescription medication for non-medical reasons in the past year? never    Single Item Drug Screen Score: 0  Interpretation: Negative screen for possible drug use disorder        Saad Bhatti MD

## 2022-02-02 NOTE — PROGRESS NOTES
Assessment/Plan:      Diagnoses and all orders for this visit:    Medicare annual wellness visit, subsequent    Encounter for diabetic foot exam ([facility] [address] [address] )  -     Diabetic foot exam; Future    Insomnia, unspecified type  Comments:  patient counseled on harms of trazodone including death  will do 25 mg only prn for sleep not daily  refill  Orders:  -     traZODone (DESYREL) 50 mg tablet; Take 0 5 tablets (25 mg total) by mouth daily at bedtime as needed for sleep    Incontinence of feces, unspecified fecal incontinence type  Comments:  reports going 6-8 times a day with fecal incontinence, no blood  using imodium which is helping slightly  denies abnormal colon cancer screening  timed stool    Need for shingles vaccine  -     Zoster Vac Recomb Adjuvanted (Shingrix) 50 MCG/0 5ML SUSR; Inject 0 5 mL into a muscle once for 1 dose Repeat dose in 2 to 6 months    Advanced care planning/counseling discussion  Comments:  2 sons are POA  Reports children are aware of wishes  Reports would want to die naturally  Does not want intubation, cpr, feeding tube  Lives with one her sons  consider metformin as cause of fecal incontinence/diarrhea if no improvement  Did not ask about coffee intake  Return in about 3 months (around 5/2/2022) for regular 3 month follow up  The following portions of the patient's history were reviewed and updated as appropriate: allergies, current medications, past family history, past medical history, past social history, past surgical history, and problem list      Subjective:     Patient ID: Filomena Macedo is a 68 y o  female  HPI    Patient reports she wants a refill of her trazodone  She reports she does not care what happens to her body even despite counseling on risk of falls, risk of bleeding if interaction with other medication, electrolyte abnormality and even death   She understanding given significant sleep disturbance and her refusal to try other medications due to hx of side effects and expense of meds and refusal for CBT insomnia, trazodone will be prescribed at 25 mg prn for insomnia, not to be used nightly  Patient reports fecal incontinence  Reports hx of 6-8 small waterry BMs daily  Patient reports going on for a few months  Denies abdominal pain  No blood in the stool  Denies any changes in health or medications prior to that time  Reports normal screening colonoscopies previously at regular intervals  No hx of IBS, IBD  No specific foods causing symptoms       PHQ-9 Depression Screening    Little interest or pleasure in doing things: 1 - several days  Feeling down, depressed, or hopeless: 1 - several days          Current Outpatient Medications on File Prior to Visit   Medication Sig Dispense Refill    alendronate (FOSAMAX) 70 mg tablet Take 1 tablet (70 mg total) by mouth once a week 12 tablet 3    buPROPion (WELLBUTRIN XL) 150 mg 24 hr tablet Take 1 tablet (150 mg total) by mouth daily 90 tablet 3    irbesartan (AVAPRO) 150 mg tablet Take 1 tablet by mouth once daily 90 tablet 0    loratadine (EQ Allergy Relief) 10 mg tablet Take 1 tablet (10 mg total) by mouth daily 90 tablet 2    metFORMIN (GLUCOPHAGE) 500 mg tablet Take 1 tablet (500 mg total) by mouth 2 (two) times a day 180 tablet 0    metoprolol tartrate (LOPRESSOR) 25 mg tablet Take 1 tablet by mouth once daily 90 tablet 0    simvastatin (ZOCOR) 40 mg tablet Take 1 tablet by mouth once daily 90 tablet 0    sodium chloride (OCEAN) 0 65 % nasal spray 1 spray into each nostril as needed for congestion or rhinitis 88 mL 1    [DISCONTINUED] traZODone (DESYREL) 50 mg tablet Take 0 5 tablets (25 mg total) by mouth daily at bedtime as needed for sleep 30 tablet 0    azelastine (OPTIVAR) 0 05 % ophthalmic solution Administer 1 drop to both eyes 2 (two) times a day 6 mL 1    SV Melatonin 5 MG TABS TAKE 1 TABLET BY MOUTH ONCE DAILY AT BEDTIME AS NEEDED FOR  INSOMNIA 30 tablet 0     No current facility-administered medications on file prior to visit  Review of Systems      Objective:    Vitals:    02/02/22 0929   BP: 140/80   BP Location: Left arm   Patient Position: Sitting   Pulse: 74   Temp: 97 5 °F (36 4 °C)   SpO2: 98%   Weight: 57 7 kg (127 lb 4 oz)   Height: 4' 11" (1 499 m)         Physical Exam  Vitals and nursing note reviewed  Constitutional:       General: She is not in acute distress  Appearance: Normal appearance  She is not ill-appearing or toxic-appearing  HENT:      Head: Normocephalic and atraumatic  Right Ear: External ear normal       Left Ear: External ear normal    Eyes:      General: No scleral icterus  Right eye: No discharge  Left eye: No discharge  Extraocular Movements: Extraocular movements intact  Conjunctiva/sclera: Conjunctivae normal    Cardiovascular:      Rate and Rhythm: Normal rate and regular rhythm  Pulses: no weak pulses     Heart sounds: Normal heart sounds  No murmur heard  No friction rub  No gallop  Pulmonary:      Effort: Pulmonary effort is normal  No respiratory distress  Breath sounds: Normal breath sounds  No wheezing or rales  Abdominal:      General: Bowel sounds are normal  There is no distension  Palpations: Abdomen is soft  Tenderness: There is no abdominal tenderness  There is no guarding or rebound  Feet:      Right foot:      Skin integrity: No ulcer, skin breakdown, erythema, warmth, callus or dry skin  Left foot:      Skin integrity: No ulcer, skin breakdown, erythema, warmth, callus or dry skin  Neurological:      Mental Status: She is alert  Diabetic Foot Exam    Patient's shoes and socks removed  Right Foot/Ankle   Right Foot Inspection  Skin Exam: skin normal and skin intact  No dry skin, no warmth, no callus, no erythema, no maceration, no abnormal color, no pre-ulcer, no ulcer and no callus       Left Foot/Ankle  Left Foot Inspection  Skin Exam: skin normal and skin intact  No dry skin, no warmth, no erythema, no maceration, normal color, no pre-ulcer, no ulcer and no callus       Assign Risk Category  No deformity present  No loss of protective sensation  No weak pulses  Risk: 0

## 2022-02-02 NOTE — PATIENT INSTRUCTIONS
Go to your pharmacy and get the shingles vaccine    Ask your eye doctor on Friday to send our office a copy of the notes from that visit so that we know what is going on with your care    Use timed bathroom break every 2 hours to prevent having an accident  When you go every 2 hours without an accident then increase that to every 3 hours  When not having an accident every 3 hours then increase to 4 hours and so on    Medicare Preventive Visit Patient Instructions  Thank you for completing your Welcome to Medicare Visit or Medicare Annual Wellness Visit today  Your next wellness visit will be due in one year (2/3/2023)  The screening/preventive services that you may require over the next 5-10 years are detailed below  Some tests may not apply to you based off risk factors and/or age  Screening tests ordered at today's visit but not completed yet may show as past due  Also, please note that scanned in results may not display below  Preventive Screenings:  Service Recommendations Previous Testing/Comments   Colorectal Cancer Screening  * Colonoscopy    * Fecal Occult Blood Test (FOBT)/Fecal Immunochemical Test (FIT)  * Fecal DNA/Cologuard Test  * Flexible Sigmoidoscopy Age: 54-65 years old   Colonoscopy: every 10 years (may be performed more frequently if at higher risk)  OR  FOBT/FIT: every 1 year  OR  Cologuard: every 3 years  OR  Sigmoidoscopy: every 5 years  Screening may be recommended earlier than age 48 if at higher risk for colorectal cancer  Also, an individualized decision between you and your healthcare provider will decide whether screening between the ages of 74-80 would be appropriate   Colonoscopy: 04/30/2013  FOBT/FIT: Not on file  Cologuard: Not on file  Sigmoidoscopy: Not on file    Screening Current     Breast Cancer Screening Age: 36 years old  Frequency: every 1-2 years  Not required if history of left and right mastectomy Mammogram: 10/15/2021    Screening Current   Cervical Cancer Screening Between the ages of 21-29, pap smear recommended once every 3 years  Between the ages of 33-67, can perform pap smear with HPV co-testing every 5 years  Recommendations may differ for women with a history of total hysterectomy, cervical cancer, or abnormal pap smears in past  Pap Smear: Not on file    Screening Not Indicated   Hepatitis C Screening Once for adults born between 1945 and 1965  More frequently in patients at high risk for Hepatitis C Hep C Antibody: 08/27/2019    Screening Current   Diabetes Screening 1-2 times per year if you're at risk for diabetes or have pre-diabetes Fasting glucose: 147 mg/dL   A1C: 6 4 %    Screening Not Indicated  History Diabetes   Cholesterol Screening Once every 5 years if you don't have a lipid disorder  May order more often based on risk factors  Lipid panel: 12/29/2020    Screening Not Indicated  History Lipid Disorder     Other Preventive Screenings Covered by Medicare:  1  Abdominal Aortic Aneurysm (AAA) Screening: covered once if your at risk  You're considered to be at risk if you have a family history of AAA  2  Lung Cancer Screening: covers low dose CT scan once per year if you meet all of the following conditions: (1) Age 50-69; (2) No signs or symptoms of lung cancer; (3) Current smoker or have quit smoking within the last 15 years; (4) You have a tobacco smoking history of at least 30 pack years (packs per day multiplied by number of years you smoked); (5) You get a written order from a healthcare provider  3  Glaucoma Screening: covered annually if you're considered high risk: (1) You have diabetes OR (2) Family history of glaucoma OR (3)  aged 48 and older OR (3)  American aged 72 and older  3   Osteoporosis Screening: covered every 2 years if you meet one of the following conditions: (1) You're estrogen deficient and at risk for osteoporosis based off medical history and other findings; (2) Have a vertebral abnormality; (3) On glucocorticoid therapy for more than 3 months; (4) Have primary hyperparathyroidism; (5) On osteoporosis medications and need to assess response to drug therapy  · Last bone density test (DXA Scan): 09/12/2017  5  HIV Screening: covered annually if you're between the age of 12-76  Also covered annually if you are younger than 13 and older than 72 with risk factors for HIV infection  For pregnant patients, it is covered up to 3 times per pregnancy  Immunizations:  Immunization Recommendations   Influenza Vaccine Annual influenza vaccination during flu season is recommended for all persons aged >= 6 months who do not have contraindications   Pneumococcal Vaccine (Prevnar and Pneumovax)  * Prevnar = PCV13  * Pneumovax = PPSV23   Adults 25-60 years old: 1-3 doses may be recommended based on certain risk factors  Adults 72 years old: Prevnar (PCV13) vaccine recommended followed by Pneumovax (PPSV23) vaccine  If already received PPSV23 since turning 65, then PCV13 recommended at least one year after PPSV23 dose  Hepatitis B Vaccine 3 dose series if at intermediate or high risk (ex: diabetes, end stage renal disease, liver disease)   Tetanus (Td) Vaccine - COST NOT COVERED BY MEDICARE PART B Following completion of primary series, a booster dose should be given every 10 years to maintain immunity against tetanus  Td may also be given as tetanus wound prophylaxis  Tdap Vaccine - COST NOT COVERED BY MEDICARE PART B Recommended at least once for all adults  For pregnant patients, recommended with each pregnancy  Shingles Vaccine (Shingrix) - COST NOT COVERED BY MEDICARE PART B  2 shot series recommended in those aged 48 and above     Health Maintenance Due:      Topic Date Due    Breast Cancer Screening: Mammogram  10/15/2023    Hepatitis C Screening  Completed     Immunizations Due:  There are no preventive care reminders to display for this patient  Advance Directives   What are advance directives?   Advance directives are legal documents that state your wishes and plans for medical care  These plans are made ahead of time in case you lose your ability to make decisions for yourself  Advance directives can apply to any medical decision, such as the treatments you want, and if you want to donate organs  What are the types of advance directives? There are many types of advance directives, and each state has rules about how to use them  You may choose a combination of any of the following:  · Living will: This is a written record of the treatment you want  You can also choose which treatments you do not want, which to limit, and which to stop at a certain time  This includes surgery, medicine, IV fluid, and tube feedings  · Durable power of  for healthcare [facility]): This is a written record that states who you want to make healthcare choices for you when you are unable to make them for yourself  This person, called a proxy, is usually a family member or a friend  You may choose more than 1 proxy  · Do not resuscitate (DNR) order:  A DNR order is used in case your heart stops beating or you stop breathing  It is a request not to have certain forms of treatment, such as CPR  A DNR order may be included in other types of advance directives  · Medical directive: This covers the care that you want if you are in a coma, near death, or unable to make decisions for yourself  You can list the treatments you want for each condition  Treatment may include pain medicine, surgery, blood transfusions, dialysis, IV or tube feedings, and a ventilator (breathing machine)  · Values history: This document has questions about your views, beliefs, and how you feel and think about life  This information can help others choose the care that you would choose  Why are advance directives important? An advance directive helps you control your care   Although spoken wishes may be used, it is better to have your wishes written down  Spoken wishes can be misunderstood, or not followed  Treatments may be given even if you do not want them  An advance directive may make it easier for your family to make difficult choices about your care  Weight Management   Why it is important to manage your weight:  Being overweight increases your risk of health conditions such as heart disease, high blood pressure, type 2 diabetes, and certain types of cancer  It can also increase your risk for osteoarthritis, sleep apnea, and other respiratory problems  Aim for a slow, steady weight loss  Even a small amount of weight loss can lower your risk of health problems  How to lose weight safely:  A safe and healthy way to lose weight is to eat fewer calories and get regular exercise  You can lose up about 1 pound a week by decreasing the number of calories you eat by 500 calories each day  Healthy meal plan for weight management:  A healthy meal plan includes a variety of foods, contains fewer calories, and helps you stay healthy  A healthy meal plan includes the following:  · Eat whole-grain foods more often  A healthy meal plan should contain fiber  Fiber is the part of grains, fruits, and vegetables that is not broken down by your body  Whole-grain foods are healthy and provide extra fiber in your diet  Some examples of whole-grain foods are whole-wheat breads and pastas, oatmeal, brown rice, and bulgur  · Eat a variety of vegetables every day  Include dark, leafy greens such as spinach, kale, carolyn greens, and mustard greens  Eat yellow and orange vegetables such as carrots, sweet potatoes, and winter squash  · Eat a variety of fruits every day  Choose fresh or canned fruit (canned in its own juice or light syrup) instead of juice  Fruit juice has very little or no fiber  · Eat low-fat dairy foods  Drink fat-free (skim) milk or 1% milk  Eat fat-free yogurt and low-fat cottage cheese   Try low-fat cheeses such as mozzarella and other reduced-fat cheeses  · Choose meat and other protein foods that are low in fat  Choose beans or other legumes such as split peas or lentils  Choose fish, skinless poultry (chicken or turkey), or lean cuts of red meat (beef or pork)  Before you cook meat or poultry, cut off any visible fat  · Use less fat and oil  Try baking foods instead of frying them  Add less fat, such as margarine, sour cream, regular salad dressing and mayonnaise to foods  Eat fewer high-fat foods  Some examples of high-fat foods include french fries, doughnuts, ice cream, and cakes  · Eat fewer sweets  Limit foods and drinks that are high in sugar  This includes candy, cookies, regular soda, and sweetened drinks  Exercise:  Exercise at least 30 minutes per day on most days of the week  Some examples of exercise include walking, biking, dancing, and swimming  You can also fit in more physical activity by taking the stairs instead of the elevator or parking farther away from stores  Ask your healthcare provider about the best exercise plan for you  © Copyright [employer] 2018 Information is for End User's use only and may not be sold, redistributed or otherwise used for commercial purposes   All illustrations and images included in CareNotes® are the copyrighted property of [employer] A M , [employer]  or [address] [place] [address] [employer]

## 2022-02-03 ENCOUNTER — HOSPITAL ENCOUNTER (OUTPATIENT)
Dept: BONE DENSITY | Facility: CLINIC | Age: 77
Discharge: HOME | End: 2022-02-03
Payer: COMMERCIAL

## 2022-02-03 DIAGNOSIS — M81.0 OSTEOPOROSIS, UNSPECIFIED OSTEOPOROSIS TYPE, UNSPECIFIED PATHOLOGICAL FRACTURE PRESENCE: ICD-10-CM

## 2022-02-03 PROCEDURE — 77080 DXA BONE DENSITY AXIAL: CPT

## 2022-02-13 DIAGNOSIS — I10 ESSENTIAL HYPERTENSION: ICD-10-CM

## 2022-02-15 ENCOUNTER — OFFICE VISIT (OUTPATIENT)
Dept: FAMILY MEDICINE CLINIC | Facility: CLINIC | Age: 77
End: 2022-02-15
Payer: COMMERCIAL

## 2022-02-15 VITALS
DIASTOLIC BLOOD PRESSURE: 70 MMHG | WEIGHT: 123.2 LBS | HEART RATE: 87 BPM | HEIGHT: 59 IN | SYSTOLIC BLOOD PRESSURE: 132 MMHG | BODY MASS INDEX: 24.84 KG/M2 | OXYGEN SATURATION: 98 % | TEMPERATURE: 97.8 F

## 2022-02-15 DIAGNOSIS — R19.7 DIARRHEA, UNSPECIFIED TYPE: Primary | ICD-10-CM

## 2022-02-15 DIAGNOSIS — E11.9 TYPE 2 DIABETES MELLITUS WITHOUT COMPLICATION, WITHOUT LONG-TERM CURRENT USE OF INSULIN (HCC): ICD-10-CM

## 2022-02-15 PROCEDURE — 3725F SCREEN DEPRESSION PERFORMED: CPT | Performed by: FAMILY MEDICINE

## 2022-02-15 PROCEDURE — 1036F TOBACCO NON-USER: CPT | Performed by: FAMILY MEDICINE

## 2022-02-15 PROCEDURE — 99213 OFFICE O/P EST LOW 20 MIN: CPT | Performed by: FAMILY MEDICINE

## 2022-02-15 PROCEDURE — 1160F RVW MEDS BY RX/DR IN RCRD: CPT | Performed by: FAMILY MEDICINE

## 2022-02-15 RX ORDER — IRBESARTAN 150 MG/1
TABLET ORAL
Qty: 90 TABLET | Refills: 0 | Status: SHIPPED | OUTPATIENT
Start: 2022-02-15 | End: 2022-03-22

## 2022-02-15 NOTE — PROGRESS NOTES
Assessment/Plan:      Diagnoses and all orders for this visit:    Diarrhea, unspecified type  Comments:  trial a period of time without metformin and trazodone  highest change of SE from meds  If no improvement, send for stool studies  Orders:  -     Stool culture; Future  -     Clostridium difficile toxin by PCR; Future  -     Giardia and Cryptosporidium Antigen Panel; Future    Type 2 diabetes mellitus without complication, without long-term current use of insulin ([facility])  Comments:  hemoglobin a1c recently slightly higher than previously  well controlled  repeat in 3 months  Orders:  -     HEMOGLOBIN A1C W/ EAG ESTIMATION; Future          Return in about 2 weeks (around 3/1/2022) for diarrhea  The following portions of the patient's history were reviewed and updated as appropriate: allergies, current medications, past family history, past medical history, past social history, past surgical history, and problem list      Subjective:     Patient ID: Luna Hernandez is a 68 y o  female  HPI      Diarrhea for about a month  Going 4 or more times a day  No blood or black stools  No recent travel or antibiotic use  No recent food association leading to diarrhea  No new medications  Been on metformin for years without issues  Trazodone dosing decreased recently but has never had diarrhea with it before  No inciting events  No sick contacts  No fevers, sometimes gets upset stomach (nausea), no vomiting  Nausea prior to the diarrhea  Small amounts of diarrhea often  Denies history of constipation  Prior to diarrhea would go everyday  Soft, never hard or pellets  Colonoscopy reports 7-8 years ago and was normal  Never had abnormal  Denies unexpected weight loss  No urinary symptoms            PHQ-9 Depression Screening    Little interest or pleasure in doing things: 0 - not at all  Feeling down, depressed, or hopeless: 0 - not at all          Current Outpatient Medications on File Prior to Visit   Medication Sig Dispense Refill    alendronate (FOSAMAX) 70 mg tablet Take 1 tablet (70 mg total) by mouth once a week 12 tablet 3    buPROPion (WELLBUTRIN XL) 150 mg 24 hr tablet Take 1 tablet (150 mg total) by mouth daily 90 tablet 3    irbesartan (AVAPRO) 150 mg tablet Take 1 tablet by mouth once daily 90 tablet 0    loratadine (EQ Allergy Relief) 10 mg tablet Take 1 tablet (10 mg total) by mouth daily 90 tablet 2    metFORMIN (GLUCOPHAGE) 500 mg tablet Take 1 tablet (500 mg total) by mouth 2 (two) times a day 180 tablet 0    metoprolol tartrate (LOPRESSOR) 25 mg tablet Take 1 tablet by mouth once daily 90 tablet 0    simvastatin (ZOCOR) 40 mg tablet Take 1 tablet by mouth once daily 90 tablet 0    sodium chloride (OCEAN) 0 65 % nasal spray 1 spray into each nostril as needed for congestion or rhinitis 88 mL 1    traZODone (DESYREL) 50 mg tablet Take 0 5 tablets (25 mg total) by mouth daily at bedtime as needed for sleep 30 tablet 0    azelastine (OPTIVAR) 0 05 % ophthalmic solution Administer 1 drop to both eyes 2 (two) times a day 6 mL 1    SV Melatonin 5 MG TABS TAKE 1 TABLET BY MOUTH ONCE DAILY AT BEDTIME AS NEEDED FOR  INSOMNIA 30 tablet 0    [DISCONTINUED] irbesartan (AVAPRO) 150 mg tablet Take 1 tablet by mouth once daily 90 tablet 0     No current facility-administered medications on file prior to visit  Review of Systems      Objective:    Vitals:    02/15/22 1338   BP: 132/70   BP Location: Left arm   Patient Position: Sitting   Pulse: 87   Temp: 97 8 °F (36 6 °C)   TempSrc: Tympanic   SpO2: 98%   Weight: 55 9 kg (123 lb 3 2 oz)   Height: 4' 11" (1 499 m)         Physical Exam  Vitals and nursing note reviewed  Constitutional:       General: She is not in acute distress  Appearance: Normal appearance  She is not ill-appearing or toxic-appearing  HENT:      Head: Normocephalic and atraumatic        Right Ear: External ear normal       Left Ear: External ear normal    Eyes:      General: No scleral icterus  Right eye: No discharge  Left eye: No discharge  Extraocular Movements: Extraocular movements intact  Conjunctiva/sclera: Conjunctivae normal    Cardiovascular:      Rate and Rhythm: Normal rate and regular rhythm  Heart sounds: Normal heart sounds  No murmur heard  No friction rub  No gallop  Pulmonary:      Effort: Pulmonary effort is normal  No respiratory distress  Breath sounds: Normal breath sounds  No wheezing or rales  Abdominal:      General: Bowel sounds are normal  There is no distension  Palpations: Abdomen is soft  There is no mass  Tenderness: There is abdominal tenderness in the right upper quadrant  There is no right CVA tenderness, left CVA tenderness, guarding or rebound  Negative signs include Osborn's sign  Neurological:      Mental Status: She is alert

## 2022-02-15 NOTE — PATIENT INSTRUCTIONS
Stop the metformin for two days and see if the diarrhea stops  If it doesn't try stopping the trazodone for 2 nights and see if that helps    If still no improvement of the diarrhea, I have ordered stool studies to rule an infection so get those done if the diarrhea hasnt stopped in 4 days

## 2022-02-23 ENCOUNTER — TELEPHONE (OUTPATIENT)
Dept: FAMILY MEDICINE CLINIC | Facility: CLINIC | Age: 77
End: 2022-02-23

## 2022-02-23 DIAGNOSIS — E11.9 TYPE 2 DIABETES MELLITUS WITHOUT COMPLICATION, WITHOUT LONG-TERM CURRENT USE OF INSULIN (HCC): Primary | ICD-10-CM

## 2022-02-23 RX ORDER — METFORMIN HYDROCHLORIDE 500 MG/1
500 TABLET, EXTENDED RELEASE ORAL
Qty: 30 TABLET | Refills: 0 | Status: SHIPPED | OUTPATIENT
Start: 2022-02-23 | End: 2022-03-22

## 2022-02-23 NOTE — TELEPHONE ENCOUNTER
Patient called in stated she started her metformin 3 days ago and now has diarrhea again    Please advise    [place]

## 2022-03-02 ENCOUNTER — RA CDI HCC (OUTPATIENT)
Dept: OTHER | Facility: HOSPITAL | Age: 77
End: 2022-03-02

## 2022-03-02 NOTE — PROGRESS NOTES
[facility] 75  coding opportunities       Chart reviewed, no opportunity found: CHART REVIEWED, NO OPPORTUNITY FOUND                        Patients insurance company: [facility] (Medicare Advantage and Commercial)

## 2022-03-11 ENCOUNTER — OFFICE VISIT (OUTPATIENT)
Dept: FAMILY MEDICINE CLINIC | Facility: CLINIC | Age: 77
End: 2022-03-11
Payer: COMMERCIAL

## 2022-03-11 VITALS
OXYGEN SATURATION: 97 % | WEIGHT: 128 LBS | HEART RATE: 71 BPM | BODY MASS INDEX: 25.8 KG/M2 | DIASTOLIC BLOOD PRESSURE: 72 MMHG | SYSTOLIC BLOOD PRESSURE: 144 MMHG | HEIGHT: 59 IN

## 2022-03-11 DIAGNOSIS — E11.9 TYPE 2 DIABETES MELLITUS WITHOUT COMPLICATION, WITHOUT LONG-TERM CURRENT USE OF INSULIN (HCC): ICD-10-CM

## 2022-03-11 DIAGNOSIS — G47.00 INSOMNIA, UNSPECIFIED TYPE: ICD-10-CM

## 2022-03-11 DIAGNOSIS — R19.7 DIARRHEA, UNSPECIFIED TYPE: Primary | ICD-10-CM

## 2022-03-11 PROCEDURE — 99214 OFFICE O/P EST MOD 30 MIN: CPT | Performed by: FAMILY MEDICINE

## 2022-03-11 NOTE — PROGRESS NOTES
Assessment/Plan:      Diagnoses and all orders for this visit:    Diarrhea, unspecified type  Comments:  resolved but now loose stools  patient reports she is happy with current BMs, does not want change in medication and would like to see how things go    Type 2 diabetes mellitus without complication, without long-term current use of insulin ([facility])  Comments:  continue metformin 500 mg XR  consider either GLP or SGLT2 vs sulfonylurea based on financial availability at follow up visit    Insomnia, unspecified type  Comments:  continue trazodone 25 mg PRN per patient request despite potential SE          Return for Next scheduled follow up with pcp  The following portions of the patient's history were reviewed and updated as appropriate: allergies, current medications, past family history, past medical history, past social history, past surgical history, and problem list      Subjective:     Patient ID: Gib Cooks is a 68 y o  female  HPI    Patient here for 2 week follow up  She was initially seen for diarrhea  Patient had improvement of diarrhea once she stopped the metformin and when she separately stopped the trazodone  Patient was stopped on BID dosing of metformin and switched to the extended release  She restarted the trazodone as well  She reports even though she does not have diarrhea now, she has looser stools than previously  She denies stopping the trazodone despite having looser stools  She wants to continue the metformin as well XR  We discussed switching to an entirely different agent but she is currently not amenable  Patient reports normal colonoscopy 7-8 years ago but only records indicate 04/30/2013 with internal hemorrhoids with recall 04/2023        PHQ-9 Depression Screening            Current Outpatient Medications on File Prior to Visit   Medication Sig Dispense Refill    alendronate (FOSAMAX) 70 mg tablet Take 1 tablet (70 mg total) by mouth once a week 12 tablet 3    azelastine (OPTIVAR) 0 05 % ophthalmic solution Administer 1 drop to both eyes 2 (two) times a day 6 mL 1    buPROPion (WELLBUTRIN XL) 150 mg 24 hr tablet Take 1 tablet (150 mg total) by mouth daily 90 tablet 3    irbesartan (AVAPRO) 150 mg tablet Take 1 tablet by mouth once daily 90 tablet 0    loratadine (EQ Allergy Relief) 10 mg tablet Take 1 tablet (10 mg total) by mouth daily 90 tablet 2    metFORMIN (GLUCOPHAGE-XR) 500 mg 24 hr tablet Take 1 tablet (500 mg total) by mouth daily with dinner 30 tablet 0    metoprolol tartrate (LOPRESSOR) 25 mg tablet Take 1 tablet by mouth once daily 90 tablet 1    simvastatin (ZOCOR) 40 mg tablet Take 1 tablet by mouth once daily 90 tablet 0    sodium chloride (OCEAN) 0 65 % nasal spray 1 spray into each nostril as needed for congestion or rhinitis 88 mL 1    traZODone (DESYREL) 50 mg tablet Take 0 5 tablets (25 mg total) by mouth daily at bedtime as needed for sleep 30 tablet 0     No current facility-administered medications on file prior to visit  Review of Systems      Objective:    Vitals:    03/11/22 1037   BP: 144/72   Pulse: 71   SpO2: 97%   Weight: 58 1 kg (128 lb)   Height: 4' 11" (1 499 m)         Physical Exam  Vitals and nursing note reviewed  Constitutional:       General: She is not in acute distress  Appearance: Normal appearance  She is not ill-appearing or toxic-appearing  HENT:      Head: Normocephalic and atraumatic  Right Ear: External ear normal       Left Ear: External ear normal    Eyes:      General: No scleral icterus  Right eye: No discharge  Left eye: No discharge  Extraocular Movements: Extraocular movements intact  Conjunctiva/sclera: Conjunctivae normal    Cardiovascular:      Rate and Rhythm: Normal rate and regular rhythm  Heart sounds: Normal heart sounds  No murmur heard  No friction rub  No gallop  Pulmonary:      Effort: Pulmonary effort is normal  No respiratory distress  Breath sounds: Normal breath sounds  No wheezing or rales  Abdominal:      General: Bowel sounds are normal  There is no distension  Palpations: Abdomen is soft  There is no mass  Tenderness: There is no abdominal tenderness  There is no guarding or rebound  Neurological:      Mental Status: She is alert

## 2022-03-11 NOTE — PATIENT INSTRUCTIONS
Continue the metformin 500 mg XR once daily      Get the diabetes test- hemoglobin A1C done in may prior to your follow up visit    Now if prior to that visit you are bothered by the loose stools, either come back and see me either so we can change your medicine up or call the office    In the meantime to try and help with the loose stools- try to eat more rice, bananas and apples, bread

## 2022-03-17 DIAGNOSIS — E78.00 HYPERCHOLESTEROLEMIA: ICD-10-CM

## 2022-03-17 RX ORDER — SIMVASTATIN 40 MG
TABLET ORAL
Qty: 90 TABLET | Refills: 0 | Status: SHIPPED | OUTPATIENT
Start: 2022-03-17 | End: 2022-06-22

## 2022-03-20 DIAGNOSIS — F32.A DEPRESSION, UNSPECIFIED DEPRESSION TYPE: ICD-10-CM

## 2022-03-22 RX ORDER — BUPROPION HYDROCHLORIDE 150 MG/1
TABLET ORAL
Qty: 90 TABLET | Refills: 0 | Status: SHIPPED | OUTPATIENT
Start: 2022-03-22 | End: 2022-04-17

## 2022-03-23 ENCOUNTER — APPOINTMENT (OUTPATIENT)
Dept: LAB | Facility: CLINIC | Age: 77
End: 2022-03-23
Payer: COMMERCIAL

## 2022-03-23 DIAGNOSIS — R19.7 DIARRHEA, UNSPECIFIED TYPE: ICD-10-CM

## 2022-03-23 PROCEDURE — 87206 SMEAR FLUORESCENT/ACID STAI: CPT

## 2022-03-23 PROCEDURE — 87015 SPECIMEN INFECT AGNT CONCNTJ: CPT

## 2022-03-23 PROCEDURE — 87505 NFCT AGENT DETECTION GI: CPT

## 2022-03-24 LAB
C DIFF TOX GENS STL QL NAA+PROBE: NEGATIVE
CAMPYLOBACTER DNA SPEC NAA+PROBE: NORMAL
G LAMBLIA AG STL QL IA: NEGATIVE
SALMONELLA DNA SPEC QL NAA+PROBE: NORMAL
SHIGA TOXIN STX GENE SPEC NAA+PROBE: NORMAL
SHIGELLA DNA SPEC QL NAA+PROBE: NORMAL

## 2022-03-25 LAB
CRYPTOSP STL QL ACID FAST STN: NORMAL
I BELLI SPEC QL ACID FAST MOD KINY STN: NORMAL

## 2022-04-11 ENCOUNTER — TELEPHONE (OUTPATIENT)
Dept: FAMILY MEDICINE CLINIC | Facility: CLINIC | Age: 77
End: 2022-04-11

## 2022-04-11 NOTE — TELEPHONE ENCOUNTER
Patient called complaining of loose stools, she said there was a point this morning that she wasn't able to make it to the bathroom, she has increased rice bananas bread and is still taking the metformin, she would like to know about changing the medication as discussed last visit   Please advise

## 2022-04-17 DIAGNOSIS — F32.A DEPRESSION, UNSPECIFIED DEPRESSION TYPE: ICD-10-CM

## 2022-04-17 RX ORDER — BUPROPION HYDROCHLORIDE 150 MG/1
TABLET ORAL
Qty: 90 TABLET | Refills: 0 | Status: SHIPPED | OUTPATIENT
Start: 2022-04-17

## 2022-04-27 ENCOUNTER — APPOINTMENT (OUTPATIENT)
Dept: LAB | Facility: CLINIC | Age: 77
End: 2022-04-27
Payer: COMMERCIAL

## 2022-04-27 DIAGNOSIS — E11.9 TYPE 2 DIABETES MELLITUS WITHOUT COMPLICATION, WITHOUT LONG-TERM CURRENT USE OF INSULIN (HCC): ICD-10-CM

## 2022-04-27 LAB
EST. AVERAGE GLUCOSE BLD GHB EST-MCNC: 134 MG/DL
HBA1C MFR BLD: 6.3 %

## 2022-04-27 PROCEDURE — 36415 COLL VENOUS BLD VENIPUNCTURE: CPT

## 2022-04-27 PROCEDURE — 83036 HEMOGLOBIN GLYCOSYLATED A1C: CPT

## 2022-05-03 NOTE — PROGRESS NOTES
Assessment/Plan:      Diagnoses and all orders for this visit:    Diarrhea, unspecified type  Comments:  No improvements despite multiple attempts at discovering a cause  GI referral for consideration of colonoscopy etc  decrease caffeine, trial off irbesartan  Orders:  -     Ambulatory Referral to Gastroenterology; Future  -     loperamide (IMODIUM) 2 mg capsule; Take 1 capsule (2 mg total) by mouth 4 (four) times a day as needed for diarrhea    Type 2 diabetes mellitus without complication, without long-term current use of insulin ([facility])  Comments:  stop the metformin not due to side effects but hba1c well controlled and she would like to decrease medications  Orders:  -     HEMOGLOBIN A1C W/ EAG ESTIMATION; Future    Insomnia, unspecified type  Comments:  continue trazodone 25 mg prn  was counseled on risks  advised to cut down on caffeine intake and especially nothing after noon as they are likely contributing                 Return in about 3 months (around 7/25/2022) for Diabetes  The following portions of the patient's history were reviewed and updated as appropriate: allergies, current medications, past family history, past medical history, past social history, past surgical history, and problem list      Subjective:     Patient ID: Trish Rod is a 68 y o  female  HPI    Patient here for 3 month follow up  She was initially seen for diarrhea  Patient had improvement of diarrhea once she stopped the metformin and when she separately stopped the trazodone  Patient was stopped on BID dosing of metformin and switched to the extended release  She restarted the trazodone as well  But then patient reported continued diarrhea even though she was completely taken off the metformin for weeks  She denies any other changes to medications  Her irbesartan could be switched to losartan given common side effect is diarrhea but reports she was fine up until January without diarrhea on the medicine   She denies stopping the trazodone despite having looser stools  She wants to stay off the metformin  We discussed switching to an entirely different agent but she was not amenable  Patient reported normal colonoscopy 7-8 years ago but only records indicate 04/30/2013 with internal hemorrhoids with recall 04/2023  Still having diarrhea mostly in the morning  Every day since January  Waterry  Small amounts  Goes 6-8 times after breakfast  Reports no coffee  Reports tea in the morning and another cup around 3-330 but no diarrhea after the second cup  Reports has no other episodes  Reports no nsaid use  Prior to the diarrhea she had normal soft stools daily  Her stool cultures for chronic and acute infectious causes were also negative  Not associated with specific foods  Denies abdominal pain with BM  Denies bloody stools  No fevers  No associated weight loss  Weight again stable since 2 month visit      Lab Results   Component Value Date    HGBA1C 6 3 (H) 04/27/2022           PHQ-9 Depression Screening    Little interest or pleasure in doing things: 1 - several days  Feeling down, depressed, or hopeless: 1 - several days          Current Outpatient Medications on File Prior to Visit   Medication Sig Dispense Refill    alendronate (FOSAMAX) 70 mg tablet Take 1 tablet (70 mg total) by mouth once a week 12 tablet 3    azelastine (OPTIVAR) 0 05 % ophthalmic solution Administer 1 drop to both eyes 2 (two) times a day 6 mL 1    buPROPion (WELLBUTRIN XL) 150 mg 24 hr tablet Take 1 tablet by mouth once daily 90 tablet 0    irbesartan (AVAPRO) 150 mg tablet Take 1 tablet by mouth once daily 90 tablet 0    loratadine (EQ Allergy Relief) 10 mg tablet Take 1 tablet (10 mg total) by mouth daily 90 tablet 2    metoprolol tartrate (LOPRESSOR) 25 mg tablet Take 1 tablet by mouth once daily 90 tablet 1    simvastatin (ZOCOR) 40 mg tablet Take 1 tablet by mouth once daily 90 tablet 0    sodium chloride (OCEAN) 0 65 % nasal spray 1 spray into each nostril as needed for congestion or rhinitis 88 mL 1    traZODone (DESYREL) 50 mg tablet TAKE 1/2 (ONE-HALF) TABLET BY MOUTH ONCE DAILY AT BEDTIME AS NEEDED FOR SLEEP 30 tablet 0    [DISCONTINUED] metFORMIN (GLUCOPHAGE-XR) 500 mg 24 hr tablet Take 1 tablet (500 mg total) by mouth daily with breakfast 90 tablet 0     No current facility-administered medications on file prior to visit  Review of Systems      Objective:    Vitals:    05/04/22 0922   BP: 126/70   BP Location: Left arm   Patient Position: Sitting   Pulse: 78   Temp: 98 1 °F (36 7 °C)   SpO2: 98%   Weight: 58 1 kg (128 lb)   Height: 4' 11" (1 499 m)         Physical Exam  Vitals and nursing note reviewed  Constitutional:       General: She is not in acute distress  Appearance: Normal appearance  She is not ill-appearing or toxic-appearing  HENT:      Head: Normocephalic and atraumatic  Right Ear: External ear normal       Left Ear: External ear normal    Eyes:      General: No scleral icterus  Right eye: No discharge  Left eye: No discharge  Extraocular Movements: Extraocular movements intact  Conjunctiva/sclera: Conjunctivae normal    Cardiovascular:      Rate and Rhythm: Normal rate and regular rhythm  Heart sounds: Normal heart sounds  No murmur heard  No friction rub  No gallop  Pulmonary:      Effort: Pulmonary effort is normal  No respiratory distress  Breath sounds: Normal breath sounds  No wheezing or rales  Abdominal:      General: Bowel sounds are normal  There is no distension  Palpations: Abdomen is soft  There is no mass  Tenderness: There is no abdominal tenderness  There is no guarding or rebound  Neurological:      Mental Status: She is alert

## 2022-05-04 ENCOUNTER — OFFICE VISIT (OUTPATIENT)
Dept: FAMILY MEDICINE CLINIC | Facility: CLINIC | Age: 77
End: 2022-05-04
Payer: COMMERCIAL

## 2022-05-04 VITALS
SYSTOLIC BLOOD PRESSURE: 126 MMHG | HEIGHT: 59 IN | HEART RATE: 78 BPM | BODY MASS INDEX: 25.8 KG/M2 | TEMPERATURE: 98.1 F | OXYGEN SATURATION: 98 % | DIASTOLIC BLOOD PRESSURE: 70 MMHG | WEIGHT: 128 LBS

## 2022-05-04 DIAGNOSIS — R19.7 DIARRHEA, UNSPECIFIED TYPE: Primary | ICD-10-CM

## 2022-05-04 DIAGNOSIS — E11.9 TYPE 2 DIABETES MELLITUS WITHOUT COMPLICATION, WITHOUT LONG-TERM CURRENT USE OF INSULIN (HCC): ICD-10-CM

## 2022-05-04 DIAGNOSIS — G47.00 INSOMNIA, UNSPECIFIED TYPE: ICD-10-CM

## 2022-05-04 PROCEDURE — 3725F SCREEN DEPRESSION PERFORMED: CPT | Performed by: FAMILY MEDICINE

## 2022-05-04 PROCEDURE — 99214 OFFICE O/P EST MOD 30 MIN: CPT | Performed by: FAMILY MEDICINE

## 2022-05-04 PROCEDURE — 1036F TOBACCO NON-USER: CPT | Performed by: FAMILY MEDICINE

## 2022-05-04 PROCEDURE — 1160F RVW MEDS BY RX/DR IN RCRD: CPT | Performed by: FAMILY MEDICINE

## 2022-05-04 RX ORDER — LOPERAMIDE HYDROCHLORIDE 2 MG/1
2 CAPSULE ORAL 4 TIMES DAILY PRN
Qty: 30 CAPSULE | Refills: 0 | Status: SHIPPED | OUTPATIENT
Start: 2022-05-04 | End: 2022-08-04

## 2022-05-04 NOTE — PATIENT INSTRUCTIONS
We will stop the metformin until you have had workup from the GI doctors  - take a dose of the imodium in the morning and see if it stops the diarrhea  - if ever you have abdominal pain with the diarrhea, or bloody stools or fevers with the diarrhea you need to stop taking the imodium and see me earlier    - we will repeat the hemoglobin a1c in 3 months prior to your follow up visit in august      For the next two weeks limit if not completely stopping the caffeine intake including your morning tea and see if the diarrhea improves  - if no improvement with stopping the tea, then for two days dont take the irbesartan and see if that resolves the diarrhea  - while off the blood pressure medicine make sure that you are checking daily to make that the blood pressure doesn't go to high

## 2022-05-17 ENCOUNTER — TELEPHONE (OUTPATIENT)
Dept: FAMILY MEDICINE CLINIC | Facility: CLINIC | Age: 77
End: 2022-05-17

## 2022-06-02 ENCOUNTER — CONSULT (OUTPATIENT)
Dept: GASTROENTEROLOGY | Facility: CLINIC | Age: 77
End: 2022-06-02
Payer: COMMERCIAL

## 2022-06-02 VITALS
WEIGHT: 127.4 LBS | RESPIRATION RATE: 16 BRPM | HEIGHT: 59 IN | SYSTOLIC BLOOD PRESSURE: 128 MMHG | DIASTOLIC BLOOD PRESSURE: 58 MMHG | BODY MASS INDEX: 25.68 KG/M2 | OXYGEN SATURATION: 98 % | TEMPERATURE: 97.9 F | HEART RATE: 81 BPM

## 2022-06-02 DIAGNOSIS — K64.9 HEMORRHOIDS, UNSPECIFIED HEMORRHOID TYPE: Primary | ICD-10-CM

## 2022-06-02 DIAGNOSIS — R19.7 DIARRHEA, UNSPECIFIED TYPE: ICD-10-CM

## 2022-06-02 PROCEDURE — 99204 OFFICE O/P NEW MOD 45 MIN: CPT | Performed by: INTERNAL MEDICINE

## 2022-06-02 PROCEDURE — 1036F TOBACCO NON-USER: CPT | Performed by: INTERNAL MEDICINE

## 2022-06-02 RX ORDER — CHOLESTYRAMINE 4 G/9G
1 POWDER, FOR SUSPENSION ORAL
Qty: 90 PACKET | Refills: 1 | Status: SHIPPED | OUTPATIENT
Start: 2022-06-02 | End: 2022-07-07

## 2022-06-02 NOTE — H&P (VIEW-ONLY)
[facility] - Outpatient Consultation  Saskia Morrison 68 y o  female MRN: [id number]  Encounter: [id number]          ASSESSMENT AND PLAN:      1  Diarrhea, unspecified type  - Ambulatory Referral to Gastroenterology  - Calprotectin,Fecal; Future  - Pancreatic elastase, fecal; Future  - Celiac Disease Panel; Future  - TSH + Free T4; Future  - cholestyramine (QUESTRAN) 4 g packet; Take 1 packet (4 g total) by mouth 3 (three) times a day with meals  Dispense: 90 packet; Refill: 1  - Colonoscopy; Future  2  Hemorrhoids, unspecified hemorrhoid type    59-year-old female presenting for a 5 month history of diarrhea  Broad differential for chronic diarrhea including functional diarrhea, pancreatic insufficiency, less likely celiac disease, less likely hypothyroidism, microscopic colitis, and much less likely malignancy based on the diarrhea being primarily postprandial   Will plan for thorough evaluation for the cause of her diarrhea including checking fecal calprotectin, pancreatic elastase, celiac disease panel, TSH  In addition will plan for colonoscopy to rule out microscopic colitis or other abnormalities to explain new onset of diarrhea  In the meantime given absence of response to fiber as well as Imodium, will plan for trial of Questran  She did have her gallbladder out all this this was several years ago, although bile acid diarrhea is still possibility  Follow-up after colonoscopy    ______________________________________________________________________    HPI:  Ms Tristin Canales is a 59-year-old female presenting for evaluation of 5 month history of diarrhea  Prior to the onset of diarrhea she denies any changes to her medications, eating habits or other lifestyle changes  She was being seen by her primary care doctor for this and adjustments to her medications were made including stopping metformin as well as irbesartan  This made no difference in her symptoms      She primarily has upwards of 5-6 loose but small bowel movements in the morning after breakfast   If she fasts she does not have diarrhea  She continues to have loose stools throughout the day but it seems to improve as the day goes on  She denies nocturnal stools  She denies rectal bleeding  Her weight has been stable over this 5 month history of having diarrhea  She denies having any days of constipation or ever passing hard stool  She has no abdominal or rectal pain associated with her symptoms  She has been taking Imodium without much in manage her symptoms  Her diabetes is well controlled with a hemoglobin A1c of 6 3  Last colonoscopy was reviewed from 2013 by Dr Lala Gallo which was unremarkable aside from internal hemorrhoids as well as a note that she had a redundant colon  REVIEW OF SYSTEMS:    CONSTITUTIONAL: Denies any fever, chills, rigors, and weight loss  HEENT: Denies odynophagia, tinnitus  CARDIOVASCULAR: No chest pain or palpitations  RESPIRATORY: Denies any cough, hemoptysis, shortness of breath or dyspnea on exertion  GASTROINTESTINAL: As noted in the History of Present Illness  GENITOURINARY: No problems with urination  Denies any hematuria or dysuria  NEUROLOGIC: No dizziness or vertigo, denies headaches  MUSCULOSKELETAL: Denies any muscle or joint pain  SKIN: Denies skin rashes or itching  ENDOCRINE:  Denies intolerance to heat or cold  PSYCHOSOCIAL: Denies depression or anxiety  Denies any recent memory loss  Historical Information   Past Medical History:   Diagnosis Date    Allergic     Anxiety the passed 2 yrs    Arthritis     Depression     [facility] [address] [facility]     [facility] mellitus [facility] [address] [facility]     Hernia 2006    Rightside    HL (hearing loss)     Hx of benign breast biopsy 10/31/2018    Left breast mammogram and ultrasound 10/10/2018      Hyperlipidemia     Hypertension     Obesity     Osteoporosis the passed 8 yrs    Visual impairment      Past Surgical History:   Procedure Laterality Date    BREAST BIOPSY Left 1990    benign per patient report    BREAST BIOPSY Left 11/13/2018    benign   NO CLIP    BREAST SURGERY Left 1990    biopsy on left breast    CHOLECYSTECTOMY      EYE SURGERY  right 6/13/2018 and left 5/16/2018    HERNIA REPAIR      HYSTERECTOMY  04/15/2016    US GUIDED BREAST BIOPSY LEFT COMPLETE Left 11/13/2018     Social History   Social History     Substance and Sexual Activity   Alcohol Use No     Social History     Substance and Sexual Activity   Drug Use No     Social History     Tobacco Use   Smoking Status Never Smoker   Smokeless Tobacco Never Used     Family History   Problem Relation Age of Onset    Heart failure Mother     Arthritis Mother     Hearing loss Mother     Heart disease Mother     Hypertension Mother     Hypothyroidism Mother     Diabetes Father     Alcohol abuse Father     Dementia Father     No Known Problems Maternal Grandmother     No Known Problems Maternal Grandfather     No Known Problems Paternal Grandmother     No Known Problems Paternal Grandfather     No Known Problems Maternal Aunt     No Known Problems Maternal Aunt     No Known Problems Paternal Aunt     No Known Problems Paternal Aunt     No Known Problems Paternal Aunt     No Known Problems Paternal Aunt        Meds/Allergies       Current Outpatient Medications:     alendronate (FOSAMAX) 70 mg tablet    buPROPion (WELLBUTRIN XL) 150 mg 24 hr tablet    cholestyramine (QUESTRAN) 4 g packet    irbesartan (AVAPRO) 150 mg tablet    loperamide (IMODIUM) 2 mg capsule    metoprolol tartrate (LOPRESSOR) 25 mg tablet    simvastatin (ZOCOR) 40 mg tablet    sodium chloride (OCEAN) 0 65 % nasal spray    traZODone (DESYREL) 50 mg tablet    azelastine (OPTIVAR) 0 05 % ophthalmic solution    loratadine (EQ Allergy Relief) 10 mg tablet    No Known Allergies        Objective     Blood pressure 128/58, pulse 81, temperature 97 9 °F (36 6 °C), temperature source Temporal, resp  rate 16, height 4' 11" (1 499 m), weight 57 8 kg (127 lb 6 4 oz), SpO2 98 %  Body mass index is 25 73 kg/m²  PHYSICAL EXAM:      General Appearance:   Alert, cooperative, no distress   HEENT:   Normocephalic, atraumatic, anicteric  Neck:  Supple, symmetrical, trachea midline   Lungs:   Clear to auscultation bilaterally; no rales, rhonchi or wheezing; respirations unlabored    Heart[de-identified]   Regular rate and rhythm; no murmur, rub, or gallop  Abdomen:   Soft, non-tender, non-distended; normal bowel sounds; no masses, no organomegaly    Genitalia:   Deferred    Rectal:   Deferred    Extremities:  No cyanosis, clubbing or edema    Pulses:  2+ and symmetric    Skin:  No jaundice, rashes, or lesions          Lab Results:   No visits with results within 1 Day(s) from this visit  Latest known visit with results is:   Appointment on 04/27/2022   Component Date Value    Hemoglobin A1C 04/27/2022 6 3 (A)    EAG 04/27/2022 134          Radiology Results:   No results found  Answers for HPI/ROS submitted by the patient on 6/1/2022  Onset: more than 1 month ago  Onset quality: gradual  Frequency: 2 to 4 times per day  Progression since onset: unchanged  Pain location: suprapubic region  Pain - numeric: 3/10  Pain quality: cramping  Radiates to: suprapubic region  anorexia: No  arthralgias: No  belching: No  constipation: No  diarrhea: Yes  dysuria: No  fever: No  flatus: No  frequency: No  headaches: No  hematochezia: No  hematuria: No  melena:  Yes  myalgias: No  nausea: Yes  weight loss: No  vomiting: No  Aggravated by: being still  Relieved by: nothing  Diagnostic workup: lower endoscopy

## 2022-06-02 NOTE — PROGRESS NOTES
[facility] - Outpatient Consultation  Erlin Alatorre 68 y o  female MRN: [id number]  Encounter: [id number]          ASSESSMENT AND PLAN:      1  Diarrhea, unspecified type  - Ambulatory Referral to Gastroenterology  - Calprotectin,Fecal; Future  - Pancreatic elastase, fecal; Future  - Celiac Disease Panel; Future  - TSH + Free T4; Future  - cholestyramine (QUESTRAN) 4 g packet; Take 1 packet (4 g total) by mouth 3 (three) times a day with meals  Dispense: 90 packet; Refill: 1  - Colonoscopy; Future  2  Hemorrhoids, unspecified hemorrhoid type    77-year-old female presenting for a 5 month history of diarrhea  Broad differential for chronic diarrhea including functional diarrhea, pancreatic insufficiency, less likely celiac disease, less likely hypothyroidism, microscopic colitis, and much less likely malignancy based on the diarrhea being primarily postprandial   Will plan for thorough evaluation for the cause of her diarrhea including checking fecal calprotectin, pancreatic elastase, celiac disease panel, TSH  In addition will plan for colonoscopy to rule out microscopic colitis or other abnormalities to explain new onset of diarrhea  In the meantime given absence of response to fiber as well as Imodium, will plan for trial of Questran  She did have her gallbladder out all this this was several years ago, although bile acid diarrhea is still possibility  Follow-up after colonoscopy    ______________________________________________________________________    HPI:  Ms Fab Herzog is a 77-year-old female presenting for evaluation of 5 month history of diarrhea  Prior to the onset of diarrhea she denies any changes to her medications, eating habits or other lifestyle changes  She was being seen by her primary care doctor for this and adjustments to her medications were made including stopping metformin as well as irbesartan  This made no difference in her symptoms      She primarily has upwards of 5-6 loose but small bowel movements in the morning after breakfast   If she fasts she does not have diarrhea  She continues to have loose stools throughout the day but it seems to improve as the day goes on  She denies nocturnal stools  She denies rectal bleeding  Her weight has been stable over this 5 month history of having diarrhea  She denies having any days of constipation or ever passing hard stool  She has no abdominal or rectal pain associated with her symptoms  She has been taking Imodium without much in manage her symptoms  Her diabetes is well controlled with a hemoglobin A1c of 6 3  Last colonoscopy was reviewed from 2013 by Dr Liliam Gutierrez which was unremarkable aside from internal hemorrhoids as well as a note that she had a redundant colon  REVIEW OF SYSTEMS:    CONSTITUTIONAL: Denies any fever, chills, rigors, and weight loss  HEENT: Denies odynophagia, tinnitus  CARDIOVASCULAR: No chest pain or palpitations  RESPIRATORY: Denies any cough, hemoptysis, shortness of breath or dyspnea on exertion  GASTROINTESTINAL: As noted in the History of Present Illness  GENITOURINARY: No problems with urination  Denies any hematuria or dysuria  NEUROLOGIC: No dizziness or vertigo, denies headaches  MUSCULOSKELETAL: Denies any muscle or joint pain  SKIN: Denies skin rashes or itching  ENDOCRINE:  Denies intolerance to heat or cold  PSYCHOSOCIAL: Denies depression or anxiety  Denies any recent memory loss  Historical Information   Past Medical History:   Diagnosis Date    Allergic     Anxiety the passed 2 yrs    Arthritis     Depression     [facility] [address]     [facility] [address] [facility]     Hernia 2006    Rightside    HL (hearing loss)     Hx of benign breast biopsy 10/31/2018    Left breast mammogram and ultrasound 10/10/2018      Hyperlipidemia     Hypertension     Obesity     Osteoporosis the passed 8 yrs    Visual impairment      Past Surgical History:   Procedure Laterality Date    BREAST BIOPSY Left 1990    benign per patient report    BREAST BIOPSY Left 11/13/2018    benign   NO CLIP    BREAST SURGERY Left 1990    biopsy on left breast    CHOLECYSTECTOMY      EYE SURGERY  right 6/13/2018 and left 5/16/2018    HERNIA REPAIR      HYSTERECTOMY  04/15/2016    US GUIDED BREAST BIOPSY LEFT COMPLETE Left 11/13/2018     Social History   Social History     Substance and Sexual Activity   Alcohol Use No     Social History     Substance and Sexual Activity   Drug Use No     Social History     Tobacco Use   Smoking Status Never Smoker   Smokeless Tobacco Never Used     Family History   Problem Relation Age of Onset    Heart failure Mother     Arthritis Mother     Hearing loss Mother     Heart disease Mother     Hypertension Mother     Hypothyroidism Mother     Diabetes Father     Alcohol abuse Father     Dementia Father     No Known Problems Maternal Grandmother     No Known Problems Maternal Grandfather     No Known Problems Paternal Grandmother     No Known Problems Paternal Grandfather     No Known Problems Maternal Aunt     No Known Problems Maternal Aunt     No Known Problems Paternal Aunt     No Known Problems Paternal Aunt     No Known Problems Paternal Aunt     No Known Problems Paternal Aunt        Meds/Allergies       Current Outpatient Medications:     alendronate (FOSAMAX) 70 mg tablet    buPROPion (WELLBUTRIN XL) 150 mg 24 hr tablet    cholestyramine (QUESTRAN) 4 g packet    irbesartan (AVAPRO) 150 mg tablet    loperamide (IMODIUM) 2 mg capsule    metoprolol tartrate (LOPRESSOR) 25 mg tablet    simvastatin (ZOCOR) 40 mg tablet    sodium chloride (OCEAN) 0 65 % nasal spray    traZODone (DESYREL) 50 mg tablet    azelastine (OPTIVAR) 0 05 % ophthalmic solution    loratadine (EQ Allergy Relief) 10 mg tablet    No Known Allergies        Objective     Blood pressure 128/58, pulse 81, temperature 97 9 °F (36 6 °C), temperature source Temporal, resp  rate 16, height 4' 11" (1 499 m), weight 57 8 kg (127 lb 6 4 oz), SpO2 98 %  Body mass index is 25 73 kg/m²  PHYSICAL EXAM:      General Appearance:   Alert, cooperative, no distress   HEENT:   Normocephalic, atraumatic, anicteric  Neck:  Supple, symmetrical, trachea midline   Lungs:   Clear to auscultation bilaterally; no rales, rhonchi or wheezing; respirations unlabored    Heart[de-identified]   Regular rate and rhythm; no murmur, rub, or gallop  Abdomen:   Soft, non-tender, non-distended; normal bowel sounds; no masses, no organomegaly    Genitalia:   Deferred    Rectal:   Deferred    Extremities:  No cyanosis, clubbing or edema    Pulses:  2+ and symmetric    Skin:  No jaundice, rashes, or lesions          Lab Results:   No visits with results within 1 Day(s) from this visit  Latest known visit with results is:   Appointment on 04/27/2022   Component Date Value    Hemoglobin A1C 04/27/2022 6 3 (A)    EAG 04/27/2022 134          Radiology Results:   No results found  Answers for HPI/ROS submitted by the patient on 6/1/2022  Onset: more than 1 month ago  Onset quality: gradual  Frequency: 2 to 4 times per day  Progression since onset: unchanged  Pain location: suprapubic region  Pain - numeric: 3/10  Pain quality: cramping  Radiates to: suprapubic region  anorexia: No  arthralgias: No  belching: No  constipation: No  diarrhea: Yes  dysuria: No  fever: No  flatus: No  frequency: No  headaches: No  hematochezia: No  hematuria: No  melena:  Yes  myalgias: No  nausea: Yes  weight loss: No  vomiting: No  Aggravated by: being still  Relieved by: nothing  Diagnostic workup: lower endoscopy

## 2022-06-02 NOTE — PATIENT INSTRUCTIONS
Scheduled date of colonoscopy (as of today):6/27/22  Physician performing colonoscopy:Pavel  Location of colonoscopy:[place]  Bowel prep reviewed with patient:Miralax/mag citrate  Instructions reviewed with patient by: Lupe  Clearances:  diabetic

## 2022-06-03 ENCOUNTER — APPOINTMENT (OUTPATIENT)
Dept: LAB | Facility: CLINIC | Age: 77
End: 2022-06-03
Payer: COMMERCIAL

## 2022-06-03 DIAGNOSIS — R19.7 DIARRHEA, UNSPECIFIED TYPE: ICD-10-CM

## 2022-06-03 LAB
T4 FREE SERPL-MCNC: 1.23 NG/DL (ref 0.76–1.46)
TSH SERPL DL<=0.05 MIU/L-ACNC: 1.72 UIU/ML (ref 0.45–4.5)

## 2022-06-03 PROCEDURE — 86231 EMA EACH IG CLASS: CPT

## 2022-06-03 PROCEDURE — 86364 TISS TRNSGLTMNASE EA IG CLAS: CPT

## 2022-06-03 PROCEDURE — 84443 ASSAY THYROID STIM HORMONE: CPT

## 2022-06-03 PROCEDURE — 84439 ASSAY OF FREE THYROXINE: CPT

## 2022-06-03 PROCEDURE — 36415 COLL VENOUS BLD VENIPUNCTURE: CPT

## 2022-06-03 PROCEDURE — 82784 ASSAY IGA/IGD/IGG/IGM EACH: CPT

## 2022-06-03 PROCEDURE — 86258 DGP ANTIBODY EACH IG CLASS: CPT

## 2022-06-04 LAB
ENDOMYSIUM IGA SER QL: NEGATIVE
GLIADIN PEPTIDE IGA SER-ACNC: 5 UNITS (ref 0–19)
GLIADIN PEPTIDE IGG SER-ACNC: 1 UNITS (ref 0–19)
IGA SERPL-MCNC: 284 MG/DL (ref 64–422)
TTG IGA SER-ACNC: <2 U/ML (ref 0–3)
TTG IGG SER-ACNC: <2 U/ML (ref 0–5)

## 2022-06-06 ENCOUNTER — APPOINTMENT (OUTPATIENT)
Dept: LAB | Facility: CLINIC | Age: 77
End: 2022-06-06
Payer: COMMERCIAL

## 2022-06-06 DIAGNOSIS — R19.7 DIARRHEA, UNSPECIFIED TYPE: ICD-10-CM

## 2022-06-06 PROCEDURE — 83993 ASSAY FOR CALPROTECTIN FECAL: CPT

## 2022-06-06 PROCEDURE — 82653 EL-1 FECAL QUANTITATIVE: CPT

## 2022-06-08 LAB — ELASTASE PANC STL-MCNT: 420 UG ELAST./G

## 2022-06-11 LAB — CALPROTECTIN STL-MCNT: 27 UG/G (ref 0–120)

## 2022-06-12 DIAGNOSIS — G47.00 INSOMNIA, UNSPECIFIED TYPE: ICD-10-CM

## 2022-06-12 RX ORDER — TRAZODONE HYDROCHLORIDE 50 MG/1
TABLET ORAL
Qty: 30 TABLET | Refills: 0 | Status: SHIPPED | OUTPATIENT
Start: 2022-06-12 | End: 2022-08-12

## 2022-06-21 DIAGNOSIS — T78.40XA ALLERGY, INITIAL ENCOUNTER: ICD-10-CM

## 2022-06-21 DIAGNOSIS — E78.00 HYPERCHOLESTEROLEMIA: ICD-10-CM

## 2022-06-22 RX ORDER — SIMVASTATIN 40 MG
TABLET ORAL
Qty: 90 TABLET | Refills: 0 | Status: SHIPPED | OUTPATIENT
Start: 2022-06-22

## 2022-06-26 RX ORDER — LIDOCAINE HYDROCHLORIDE 10 MG/ML
0.5 INJECTION, SOLUTION EPIDURAL; INFILTRATION; INTRACAUDAL; PERINEURAL ONCE AS NEEDED
Status: CANCELLED | OUTPATIENT
Start: 2022-06-26

## 2022-06-26 RX ORDER — SODIUM CHLORIDE, SODIUM LACTATE, POTASSIUM CHLORIDE, CALCIUM CHLORIDE 600; 310; 30; 20 MG/100ML; MG/100ML; MG/100ML; MG/100ML
125 INJECTION, SOLUTION INTRAVENOUS CONTINUOUS
Status: CANCELLED | OUTPATIENT
Start: 2022-06-26

## 2022-06-27 ENCOUNTER — ANESTHESIA EVENT (OUTPATIENT)
Dept: GASTROENTEROLOGY | Facility: HOSPITAL | Age: 77
End: 2022-06-27

## 2022-06-27 ENCOUNTER — ANESTHESIA (OUTPATIENT)
Dept: GASTROENTEROLOGY | Facility: HOSPITAL | Age: 77
End: 2022-06-27

## 2022-06-27 ENCOUNTER — HOSPITAL ENCOUNTER (OUTPATIENT)
Dept: GASTROENTEROLOGY | Facility: HOSPITAL | Age: 77
Setting detail: OUTPATIENT SURGERY
Discharge: HOME | End: 2022-06-27
Admitting: INTERNAL MEDICINE
Payer: COMMERCIAL

## 2022-06-27 VITALS
WEIGHT: 127 LBS | TEMPERATURE: 97.2 F | BODY MASS INDEX: 25.6 KG/M2 | HEART RATE: 78 BPM | SYSTOLIC BLOOD PRESSURE: 114 MMHG | OXYGEN SATURATION: 99 % | RESPIRATION RATE: 18 BRPM | DIASTOLIC BLOOD PRESSURE: 54 MMHG | HEIGHT: 59 IN

## 2022-06-27 DIAGNOSIS — R19.7 DIARRHEA, UNSPECIFIED TYPE: ICD-10-CM

## 2022-06-27 LAB — GLUCOSE SERPL-MCNC: 172 MG/DL (ref 65–140)

## 2022-06-27 PROCEDURE — 88305 TISSUE EXAM BY PATHOLOGIST: CPT | Performed by: PATHOLOGY

## 2022-06-27 PROCEDURE — 45380 COLONOSCOPY AND BIOPSY: CPT | Performed by: INTERNAL MEDICINE

## 2022-06-27 PROCEDURE — 82948 REAGENT STRIP/BLOOD GLUCOSE: CPT

## 2022-06-27 RX ORDER — PROPOFOL 10 MG/ML
INJECTION, EMULSION INTRAVENOUS AS NEEDED
Status: DISCONTINUED | OUTPATIENT
Start: 2022-06-27 | End: 2022-06-27

## 2022-06-27 RX ORDER — SODIUM CHLORIDE, SODIUM LACTATE, POTASSIUM CHLORIDE, CALCIUM CHLORIDE 600; 310; 30; 20 MG/100ML; MG/100ML; MG/100ML; MG/100ML
125 INJECTION, SOLUTION INTRAVENOUS CONTINUOUS
Status: DISCONTINUED | OUTPATIENT
Start: 2022-06-27 | End: 2022-07-01 | Stop reason: HOSPADM

## 2022-06-27 RX ORDER — LIDOCAINE HYDROCHLORIDE 10 MG/ML
0.5 INJECTION, SOLUTION EPIDURAL; INFILTRATION; INTRACAUDAL; PERINEURAL ONCE AS NEEDED
Status: DISCONTINUED | OUTPATIENT
Start: 2022-06-27 | End: 2022-07-01 | Stop reason: HOSPADM

## 2022-06-27 RX ADMIN — PROPOFOL 100 MG: 10 INJECTION, EMULSION INTRAVENOUS at 07:29

## 2022-06-27 RX ADMIN — PROPOFOL 20 MG: 10 INJECTION, EMULSION INTRAVENOUS at 07:41

## 2022-06-27 RX ADMIN — SODIUM CHLORIDE, SODIUM LACTATE, POTASSIUM CHLORIDE, AND CALCIUM CHLORIDE 125 ML/HR: .6; .31; .03; .02 INJECTION, SOLUTION INTRAVENOUS at 07:10

## 2022-06-27 RX ADMIN — PROPOFOL 40 MG: 10 INJECTION, EMULSION INTRAVENOUS at 07:36

## 2022-06-27 NOTE — ANESTHESIA POSTPROCEDURE EVALUATION
Post-Op Assessment Note    CV Status:  Stable  Pain Score: 0    Pain management: adequate     Mental Status:  Alert and awake   Hydration Status:  Euvolemic   PONV Controlled:  Controlled   Airway Patency:  Patent      Post Op Vitals Reviewed: Yes            No complications documented      BP   124/72   Temp      Pulse  77   Resp   16   SpO2   97

## 2022-06-27 NOTE — INTERVAL H&P NOTE
H&P reviewed  After examining the patient I find no changes in the patients condition since the H&P had been written      Vitals:    06/27/22 0656   BP: 135/64   Pulse: 87   Resp: 16   Temp: 98 5 °F (36 9 °C)   SpO2: 99%

## 2022-06-27 NOTE — ANESTHESIA PREPROCEDURE EVALUATION
Procedure:  COLONOSCOPY    Relevant Problems   CARDIO   (+) Essential hypertension   (+) Hypertriglyceridemia      ENDO   (+) Type 2 diabetes mellitus without complication, without long-term current use of insulin (HCC)        Physical Exam    Airway    Mallampati score: II  TM Distance: >3 FB  Neck ROM: full     Dental   upper dentures and lower dentures,     Cardiovascular  Rhythm: regular, Rate: normal, Cardiovascular exam normal    Pulmonary  Pulmonary exam normal Breath sounds clear to auscultation,     Other Findings        Anesthesia Plan  ASA Score- 2     Anesthesia Type- IV sedation with anesthesia with ASA Monitors  Additional Monitors:   Airway Plan:     Comment: Discussed risks/benefits, including medication reactions, awareness, aspiration, and serious/life threatening complications  Plan to maintain native airway with IVGA, monitored with EtCO2  Plan Factors-Exercise tolerance (METS): >4 METS  Patient summary reviewed  Patient instructed to abstain from smoking on day of procedure  Patient did not smoke on day of surgery  Induction- intravenous  Postoperative Plan-     Informed Consent- Anesthetic plan and risks discussed with patient  I personally reviewed this patient with the CRNA  Discussed and agreed on the Anesthesia Plan with the CRNA  Jonas Saldana

## 2022-06-27 NOTE — ANESTHESIA PREPROCEDURE EVALUATION
Procedure:  COLONOSCOPY    Relevant Problems   CARDIO   (+) Essential hypertension   (+) Hypertriglyceridemia      ENDO   (+) Type 2 diabetes mellitus without complication, without long-term current use of insulin (HCC)        Physical Exam    Airway    Mallampati score: II  TM Distance: >3 FB  Neck ROM: full     Dental   upper dentures and lower dentures,     Cardiovascular  Rhythm: regular, Rate: normal, Cardiovascular exam normal    Pulmonary  Pulmonary exam normal Breath sounds clear to auscultation,     Other Findings        Anesthesia Plan  ASA Score- 2     Anesthesia Type- IV sedation with anesthesia with ASA Monitors  Additional Monitors:   Airway Plan:     Comment: Discussed risks/benefits, including medication reactions, awareness, aspiration, and serious/life threatening complications  Plan to maintain native airway with IVGA, monitored with EtCO2  Plan Factors-Exercise tolerance (METS): >4 METS  Patient summary reviewed  Patient instructed to abstain from smoking on day of procedure  Patient did not smoke on day of surgery  Induction- intravenous  Postoperative Plan-     Informed Consent- Anesthetic plan and risks discussed with patient  I personally reviewed this patient with the CRNA  Discussed and agreed on the Anesthesia Plan with the CRNA  Kaylene Mays

## 2022-07-07 ENCOUNTER — OFFICE VISIT (OUTPATIENT)
Dept: GASTROENTEROLOGY | Facility: CLINIC | Age: 77
End: 2022-07-07
Payer: COMMERCIAL

## 2022-07-07 VITALS
SYSTOLIC BLOOD PRESSURE: 118 MMHG | DIASTOLIC BLOOD PRESSURE: 62 MMHG | WEIGHT: 127.4 LBS | TEMPERATURE: 98.6 F | RESPIRATION RATE: 18 BRPM | HEART RATE: 84 BPM | BODY MASS INDEX: 25.68 KG/M2 | OXYGEN SATURATION: 97 % | HEIGHT: 59 IN

## 2022-07-07 DIAGNOSIS — R19.7 DIARRHEA, UNSPECIFIED TYPE: Primary | ICD-10-CM

## 2022-07-07 DIAGNOSIS — Z00.00 HEALTHCARE MAINTENANCE: ICD-10-CM

## 2022-07-07 PROCEDURE — 1160F RVW MEDS BY RX/DR IN RCRD: CPT | Performed by: FAMILY MEDICINE

## 2022-07-07 PROCEDURE — 99213 OFFICE O/P EST LOW 20 MIN: CPT | Performed by: FAMILY MEDICINE

## 2022-07-07 RX ORDER — MONTELUKAST SODIUM 4 MG/1
1 TABLET, CHEWABLE ORAL 2 TIMES DAILY
Qty: 60 TABLET | Refills: 1 | Status: SHIPPED | OUTPATIENT
Start: 2022-07-07

## 2022-07-07 NOTE — PROGRESS NOTES
[facility] - Outpatient Follow-up Note  Stefanie Castañeda 68 y o  female MRN: [id number]  Encounter: [id number]          ASSESSMENT AND PLAN:      1  Diarrhea, unspecified type  Patient with persistent diarrhea since February 2022, described as multiple postprandial loose to watery bowel movements every morning shortly after breakfast  Workup including pancreatic elastase, fecal calprotectin, TSH, celiac disease antibody profile, Giardia antigen, Cryptosporidium smear, stool enteric bacterial panel, and C diff unremarkable  Colonoscopy on 06/27 notable for normal appearing colonic mucosa s/p biopsy to r/o microscopic colitis; small internal hemorrhoids  Unfortunately, biopsies are in process at the time of this appointment  Etiology currently unclear, but differential includes post-cholecystectomy diarrhea, microscopic colitis, and IBS-D  She has tried Questran, but cannot tolerate texture  Recommended a trial of colestipol to begin with 1 g twice daily  Discussed that if biopsies were to reflect microscopic colitis, would adjust treatment to include bismuth and/or budesonide  If biopsies are unremarkable, and no improvement with colestipol can consider trial of rifaximin for IBS-D   - colestipol (COLESTID) 1 g tablet; Take 1 tablet (1 g total) by mouth 2 (two) times a day  Dispense: 60 tablet; Refill: 1    2  Healthcare maintenance  CRC screening colonoscopy up-to-date  Colonoscopy on 06/27 notable for normal appearing colonic mucosa s/p biopsy to rule out microscopic colitis; small internal hemorrhoids  Biopsies are process at the time of this appointment  No further screening colonoscopy is recommended due to age  Follow-up in 3 months or sooner if necessary       ______________________________________________________________________    SUBJECTIVE: Patient is a 68 y o  female with PMH significant for anxiety depression, DM2, hyperlipidemia, hypertension, osteoporosis, and remote cholecystectomy who presents today for follow-up regarding diarrhea  Patient was previously seen by Dr Annel Marin  Patient was recently seen by Dr Annel Marin on 06/02/2022 for evaluation of acute onset of diarrhea x5 months, described as 5-6 loose but small-volume bowel movements in the morning shortly after eating breakfast  Workup including pancreatic elastase, fecal calprotectin, TSH, celiac disease antibody profile, Giardia antigen, Cryptosporidium spare, stool enteric bacterial panel, and C diff was enitrely negative/within normal limits  Colonoscopy on 06/27 notable for normal appearing colonic mucosa s/p biopsy to rule out microscopic colitis; small internal hemorrhoids  Biopsies are process at the time of this appointment  She was recommended to try Questran, which she has tried taking but cannot tolerate the texture  She continues to have upwards of eight loose to watery bowel movement shortly after eating breakfast  Denies having had a solid bowel movement since February 2022  Denies dietary changes or new medications around the onset of her symptoms  Denies fevers/chills, nausea/vomiting, abdominal pain, constipation, noticing blood in her stool or black/tarry looking stools or unintentional weight loss  REVIEW OF SYSTEMS IS OTHERWISE NEGATIVE  Historical Information   Past Medical History:   Diagnosis Date    Allergic     Anxiety the passed 2 yrs    Arthritis     Colon polyp     Depression     Diabetes ([address]     Diabetes mellitus ([address] )     Hernia 2006    Rightside    HL (hearing loss)     Hx of benign breast biopsy 10/31/2018    Left breast mammogram and ultrasound 10/10/2018      Hyperlipidemia     Hypertension     Obesity     Osteoporosis the passed 8 yrs    Visual impairment      Past Surgical History:   Procedure Laterality Date    BREAST BIOPSY Left 1990    benign per patient report    BREAST BIOPSY Left 11/13/2018    benign   NO CLIP    BREAST SURGERY Left 1990 biopsy on left breast    CHOLECYSTECTOMY      EYE SURGERY  right 6/13/2018 and left 5/16/2018    HERNIA REPAIR      HYSTERECTOMY  04/15/2016    US GUIDED BREAST BIOPSY LEFT COMPLETE Left 11/13/2018     Social History   Social History     Substance and Sexual Activity   Alcohol Use No     Social History     Substance and Sexual Activity   Drug Use No     Social History     Tobacco Use   Smoking Status Never Smoker   Smokeless Tobacco Never Used     Family History   Problem Relation Age of Onset    Heart failure Mother     Arthritis Mother     Hearing loss Mother     Heart disease Mother     Hypertension Mother     Hypothyroidism Mother     Diabetes Father     Alcohol abuse Father     Dementia Father     No Known Problems Maternal Grandmother     No Known Problems Maternal Grandfather     No Known Problems Paternal Grandmother     No Known Problems Paternal Grandfather     No Known Problems Maternal Aunt     No Known Problems Maternal Aunt     No Known Problems Paternal Aunt     No Known Problems Paternal Aunt     No Known Problems Paternal Aunt     No Known Problems Paternal Aunt        Meds/Allergies       Current Outpatient Medications:     alendronate (FOSAMAX) 70 mg tablet    azelastine (OPTIVAR) 0 05 % ophthalmic solution    buPROPion (WELLBUTRIN XL) 150 mg 24 hr tablet    colestipol (COLESTID) 1 g tablet    EQ Loratadine 10 MG tablet    irbesartan (AVAPRO) 150 mg tablet    loperamide (IMODIUM) 2 mg capsule    metoprolol tartrate (LOPRESSOR) 25 mg tablet    simvastatin (ZOCOR) 40 mg tablet    sodium chloride (OCEAN) 0 65 % nasal spray    traZODone (DESYREL) 50 mg tablet    No Known Allergies        Objective     Blood pressure 118/62, pulse 84, temperature 98 6 °F (37 °C), temperature source Tympanic, resp  rate 18, height 4' 11" (1 499 m), weight 57 8 kg (127 lb 6 4 oz), SpO2 97 %  Body mass index is 25 73 kg/m²        PHYSICAL EXAM:      General Appearance:   Alert, cooperative, no distress   HEENT:   Normocephalic, atraumatic, anicteric  Neck:  Supple, symmetrical, trachea midline   Lungs:   Clear to auscultation bilaterally; no rales, rhonchi or wheezing; respirations unlabored    Heart[de-identified]   Regular rate and rhythm; no murmur, rub, or gallop  Abdomen:   Soft, non-tender, non-distended; normal bowel sounds; no masses, no organomegaly    Genitalia:   Deferred    Rectal:   Deferred    Extremities:  No cyanosis, clubbing or edema    Pulses:  2+ and symmetric    Skin:  No jaundice, rashes, or lesions    Lymph nodes:  No palpable cervical lymphadenopathy        Lab Results:   No visits with results within 1 Day(s) from this visit  Latest known visit with results is:   Hospital Outpatient Visit on 06/27/2022   Component Date Value    POC Glucose 06/27/2022 172 (A)         Radiology Results:   Colonoscopy    Result Date: 6/27/2022  Narrative: [address] [facility] [place] [id number] [phone] DATE OF SERVICE: 6/27/22 PHYSICIAN(S): Attending: Pola Longoria DO Fellow: No Staff Documented INDICATION: Diarrhea, unspecified type POST-OP DIAGNOSIS: See the impression below  HISTORY: Prior colonoscopy: 10 years ago  BOWEL PREPARATION: Miralax/Dulcolax PREPROCEDURE: Informed consent was obtained for the procedure, including sedation  Risks including but not limited to bleeding, infection, perforation, adverse drug reaction and aspiration were explained in detail  Also explained about less than 100% sensitivity with the exam and other alternatives  The patient was placed in the left lateral decubitus position  DETAILS OF PROCEDURE: Patient was taken to the procedure room where a time out was performed to confirm correct patient and correct procedure   The patient underwent monitored anesthesia care, which was administered by an anesthesia professional  The patient's blood pressure, heart rate, level of consciousness, oxygen and respirations were monitored throughout the procedure  A digital rectal exam was performed  The scope was introduced through the anus and advanced to the cecum  Retroflexion was performed in the rectum  The quality of bowel preparation was evaluated using the [facility] Bowel Preparation Scale with scores of: right colon = 2, transverse colon = 2, left colon = 2  The total BBPS score was 6  Bowel prep was adequate  The patient experienced no blood loss  The procedure was not difficult  The patient tolerated the procedure well  There were no apparent complications  ANESTHESIA INFORMATION: ASA: ASA status not filed in the log  Anesthesia Type: Anesthesia type not filed in the log  MEDICATIONS: No administrations occurring from 0727 to 0745 on 06/27/22 FINDINGS: All observed locations appeared normal, including the entire colon  Performed 12 random biopsies using biopsy forceps  Small, internal hemorrhoids EVENTS: Procedure Events Event Event Time ENDO CECUM REACHED 6/27/2022  7:35 AM ENDO SCOPE OUT TIME 6/27/2022  7:42 AM SPECIMENS: ID Type Source Tests Collected by Time Destination 1 : randome bx r/o microscopic colitis Tissue Colon TISSUE EXAM Chani Zhao DO 6/27/2022  7:38 AM  EQUIPMENT: Colonoscope -     Impression: Normal appearing colonic mucosa   Biopsies obtained to rule out microscopic colitis Small internal hemorrhoids RECOMMENDATION: No further screening colonoscopies necessary due to age (age = 77 or greater) Await pathology results Call GI office if you experience abdominal pain, fevers, rectal bleeding  Aguilar Zhao DO

## 2022-07-08 ENCOUNTER — TELEPHONE (OUTPATIENT)
Dept: FAMILY MEDICINE CLINIC | Facility: CLINIC | Age: 77
End: 2022-07-08

## 2022-07-08 NOTE — TELEPHONE ENCOUNTER
PT called and stated the nasal spray and prescription for claritin are not helping  She is having a lot of post nasal drip  It is causing her stomach to hurt   Wanted to know what medication will be best to help dry it up

## 2022-07-27 ENCOUNTER — APPOINTMENT (OUTPATIENT)
Dept: LAB | Facility: CLINIC | Age: 77
End: 2022-07-27
Payer: COMMERCIAL

## 2022-07-27 DIAGNOSIS — E11.9 TYPE 2 DIABETES MELLITUS WITHOUT COMPLICATION, WITHOUT LONG-TERM CURRENT USE OF INSULIN (HCC): ICD-10-CM

## 2022-07-27 LAB
EST. AVERAGE GLUCOSE BLD GHB EST-MCNC: 146 MG/DL
HBA1C MFR BLD: 6.7 %

## 2022-07-27 PROCEDURE — 36415 COLL VENOUS BLD VENIPUNCTURE: CPT

## 2022-07-27 PROCEDURE — 83036 HEMOGLOBIN GLYCOSYLATED A1C: CPT

## 2022-08-01 ENCOUNTER — TELEPHONE (OUTPATIENT)
Dept: FAMILY MEDICINE CLINIC | Facility: CLINIC | Age: 77
End: 2022-08-01

## 2022-08-01 NOTE — TELEPHONE ENCOUNTER
Pt called in and wanted her metformin 500 mg refilled but that medication is not currently on her medication list  If this medication gets refilled pt would like it sent to [address] [place] [address] in [place] [id number]

## 2022-08-03 NOTE — PROGRESS NOTES
Assessment/Plan:      Diagnoses and all orders for this visit:    Type 2 diabetes mellitus without complication, without long-term current use of insulin ([facility])  Comments:  hba1c worsened slightly from 6 4 to 6 7  discussed w/ pt, she will stop metformin completed  denies doing this despite plan at last visit  eat healthier    Insomnia, unspecified type  Comments:  going well  continue trazodone 25 mg prn    Essential hypertension  Comments:  patient on irbesartan and metoprolol 25  BP well controlled 120/58 today  no changes to medication  Return in about 4 weeks (around 9/1/2022)  The following portions of the patient's history were reviewed and updated as appropriate: allergies, current medications, past family history, past medical history, past social history, past surgical history, and problem list      Subjective:     Patient ID: Alexus Yap is a 68 y o  female  HPI    Patient here for 3 month follow up  In short: She reports she was fine up until January without diarrhea on the medicines she has taken chronically  Patient reported normal colonoscopy 7-8 years ago but only records indicate 04/30/2013 with internal hemorrhoids with recall 04/2023  She did see GI and had normal colonoscopy per patient 06/22  She was started on a new medication BID colestipol but denies any improvement  Unfortunately she was confused and thought plan was to decreased metformin to once daily instead of stopping but previous plan was to switch to extended release once daily metformin but then last visit the plan was to stop completely  Will stop it completely for one month to check for improvement  Her hba1c did worsen to 6 7 despite her reporting continued use of the medication  Still having diarrhea mostly in the morning  Every day since January  Waterry  Small amounts  Goes 6-8 times after breakfast  Reports no coffee   Reports tea in the morning and another cup around 3-330 but no diarrhea after the second cup  Reports has no other episodes  Reports no nsaid use  Prior to the diarrhea she had normal soft stools daily  Her stool cultures for chronic and acute infectious causes were also negative  GI ordered additional testing for celiacs etc which were negative as well  Not associated with specific foods  Denies abdominal pain with BM  Denies bloody stools  No fevers  No associated weight loss  Weight again essentially stable since last visit  Lab Results   Component Value Date    HGBA1C 6 7 (H) 2022    HGBA1C 6 3 (H) 2022    HGBA1C 6 4 (H) 2022     Lab Results   Component Value Date    GLUF 147 (H) 2021    LDLCALC 49 2020    CREATININE 0 85 2021     Wt Readings from Last 3 Encounters:   22 58 2 kg (128 lb 6 4 oz)   22 57 8 kg (127 lb 6 4 oz)   22 57 6 kg (127 lb)         PHQ-9 Depression Screening    Little interest or pleasure in doing things: 0 - not at all  Feeling down, depressed, or hopeless: 3 - nearly every day  Trouble falling or staying asleep, or sleeping too much: 0 - not at all  Feeling tired or having little energy: 1 - several days  Poor appetite or overeatin - not at all  Feeling bad about yourself - or that you are a failure or have let yourself or your family down: 0 - not at all  Trouble concentrating on things, such as reading the newspaper or watching television: 0 - not at all  Moving or speaking so slowly that other people could have noticed   Or the opposite - being so fidgety or restless that you have been moving around a lot more than usual: 0 - not at all  Thoughts that you would be better off dead, or of hurting yourself in some way: 0 - not at all  PHQ-9 Score: 4  Score Interpretation: No or Minimal depression          Current Outpatient Medications on File Prior to Visit   Medication Sig Dispense Refill    alendronate (FOSAMAX) 70 mg tablet Take 1 tablet (70 mg total) by mouth once a week 12 tablet 3    azelastine (OPTIVAR) 0 05 % ophthalmic solution Administer 1 drop to both eyes 2 (two) times a day 6 mL 1    buPROPion (WELLBUTRIN XL) 150 mg 24 hr tablet Take 1 tablet by mouth once daily 90 tablet 0    colestipol (COLESTID) 1 g tablet Take 1 tablet (1 g total) by mouth 2 (two) times a day 60 tablet 1    EQ Loratadine 10 MG tablet Take 1 tablet by mouth once daily 90 tablet 0    irbesartan (AVAPRO) 150 mg tablet Take 1 tablet by mouth once daily 90 tablet 0    metoprolol tartrate (LOPRESSOR) 25 mg tablet Take 1 tablet by mouth once daily 90 tablet 1    simvastatin (ZOCOR) 40 mg tablet Take 1 tablet by mouth once daily 90 tablet 0    sodium chloride (OCEAN) 0 65 % nasal spray 1 spray into each nostril as needed for congestion or rhinitis 88 mL 1    traZODone (DESYREL) 50 mg tablet TAKE 1/2 (ONE-HALF) TABLET BY MOUTH ONCE DAILY AT BEDTIME AS NEEDED FOR SLEEP 30 tablet 0    [DISCONTINUED] loperamide (IMODIUM) 2 mg capsule Take 1 capsule (2 mg total) by mouth 4 (four) times a day as needed for diarrhea 30 capsule 0     No current facility-administered medications on file prior to visit  Review of Systems      Objective:    Vitals:    08/04/22 1030   BP: 120/58   BP Location: Left arm   Patient Position: Sitting   Pulse: 74   Temp: 98 4 °F (36 9 °C)   TempSrc: Tympanic   SpO2: 99%   Weight: 58 2 kg (128 lb 6 4 oz)   Height: 4' 11" (1 499 m)         Physical Exam  Vitals and nursing note reviewed  Constitutional:       General: She is not in acute distress  Appearance: Normal appearance  She is not ill-appearing or toxic-appearing  HENT:      Head: Normocephalic and atraumatic  Right Ear: External ear normal       Left Ear: External ear normal    Eyes:      General: No scleral icterus  Right eye: No discharge  Left eye: No discharge  Extraocular Movements: Extraocular movements intact        Conjunctiva/sclera: Conjunctivae normal    Cardiovascular:      Rate and Rhythm: Normal rate and regular rhythm  Heart sounds: Normal heart sounds  No murmur heard  No friction rub  No gallop  Pulmonary:      Effort: Pulmonary effort is normal  No respiratory distress  Breath sounds: Normal breath sounds  No wheezing or rales  Abdominal:      General: Bowel sounds are normal  There is no distension  Palpations: Abdomen is soft  There is no mass  Tenderness: There is no abdominal tenderness  There is no guarding or rebound  Neurological:      Mental Status: She is alert

## 2022-08-04 ENCOUNTER — OFFICE VISIT (OUTPATIENT)
Dept: FAMILY MEDICINE CLINIC | Facility: CLINIC | Age: 77
End: 2022-08-04
Payer: COMMERCIAL

## 2022-08-04 VITALS
HEART RATE: 74 BPM | DIASTOLIC BLOOD PRESSURE: 58 MMHG | WEIGHT: 128.4 LBS | OXYGEN SATURATION: 99 % | BODY MASS INDEX: 25.88 KG/M2 | SYSTOLIC BLOOD PRESSURE: 120 MMHG | TEMPERATURE: 98.4 F | HEIGHT: 59 IN

## 2022-08-04 DIAGNOSIS — I10 ESSENTIAL HYPERTENSION: ICD-10-CM

## 2022-08-04 DIAGNOSIS — G47.00 INSOMNIA, UNSPECIFIED TYPE: ICD-10-CM

## 2022-08-04 DIAGNOSIS — E11.9 TYPE 2 DIABETES MELLITUS WITHOUT COMPLICATION, WITHOUT LONG-TERM CURRENT USE OF INSULIN (HCC): Primary | ICD-10-CM

## 2022-08-04 PROCEDURE — 1160F RVW MEDS BY RX/DR IN RCRD: CPT | Performed by: FAMILY MEDICINE

## 2022-08-04 PROCEDURE — 3725F SCREEN DEPRESSION PERFORMED: CPT | Performed by: FAMILY MEDICINE

## 2022-08-04 PROCEDURE — 99213 OFFICE O/P EST LOW 20 MIN: CPT | Performed by: FAMILY MEDICINE

## 2022-08-04 NOTE — PATIENT INSTRUCTIONS
Call the gastroenterology office to let them know the diarrhea has not improved on the new medications and see if they have a recommendation or if they want to see you earlier  Stop the metformin completely  No doses at all throughout the entire day  - we will decide if you need to restart at your next visit

## 2022-08-05 ENCOUNTER — TELEPHONE (OUTPATIENT)
Dept: OTHER | Facility: OTHER | Age: 77
End: 2022-08-05

## 2022-08-05 ENCOUNTER — RA CDI HCC (OUTPATIENT)
Dept: OTHER | Facility: HOSPITAL | Age: 77
End: 2022-08-05

## 2022-08-05 NOTE — TELEPHONE ENCOUNTER
Spoke with pt reporting symptoms of continued diarrhea in the AM when she wakes up having approx 6-8 episodes, then she is better throughout the rest of the day  Since starting on the colestipol on 7/7, symptoms are the same  Denies any other concerning symptoms  pt would like to try something else

## 2022-08-05 NOTE — TELEPHONE ENCOUNTER
Patient is calling to report that medication colestipol (COLESTID) 1 g tablet is not working and she would like to try something else

## 2022-08-05 NOTE — PROGRESS NOTES
[address]  coding opportunities       Chart reviewed, no opportunity found: CHART REVIEWED, NO OPPORTUNITY FOUND        Patients Insurance     Medicare Insurance: Medicare

## 2022-08-10 NOTE — TELEPHONE ENCOUNTER
Patient called back again about same medication and continues to have diarrhea and would like a phone call back

## 2022-08-10 NOTE — TELEPHONE ENCOUNTER
Patient has tried both Questran and Colestid without improvement  Has patient tried a daily fiber supplement such as Metamucil/Benefiber or over-the-counter Imodium? If not, would recommend patient to begin taking a daily fiber supplement to start with 1-2 tsp daily and titrate up to 1-2 tbsp daily  If her diarrhea is primarily in the morning, she can also try taking fiber later in the day (such as with dinner) for better control  In addition to a daily fiber supplement, she may also try over-the-counter Imodium as needed  Also appears her PCP has recommended she stop her metformin, which may help improve her diarrhea  If patient's diarrhea persists despite recommendations, would recommend sooner follow-up to discuss further evaluation vs alternative antidiarrheals

## 2022-08-11 NOTE — TELEPHONE ENCOUNTER
Spoke with pt, relayed recommendations from [employer]  Pt has not ever tried the fiber supplements  Was taking imodium prior to the prescriptions  Advised fiber supplement as per Isra & Imodium 2 tablets in the AM then 1 with each episode of diarrhea up to 8 tablets max  Instructed to call if symptoms persist/ don't improve after a week  Pt has follow up appt with Dr Braeden Aguilar 10/7/22   Pt agreeable and verbalized understanding of all information

## 2022-08-14 DIAGNOSIS — G47.00 INSOMNIA, UNSPECIFIED TYPE: ICD-10-CM

## 2022-08-14 RX ORDER — TRAZODONE HYDROCHLORIDE 50 MG/1
TABLET ORAL
Qty: 30 TABLET | Refills: 0 | Status: SHIPPED | OUTPATIENT
Start: 2022-08-14 | End: 2022-10-09

## 2022-08-28 DIAGNOSIS — M81.0 OSTEOPOROSIS, UNSPECIFIED OSTEOPOROSIS TYPE, UNSPECIFIED PATHOLOGICAL FRACTURE PRESENCE: ICD-10-CM

## 2022-08-30 RX ORDER — ALENDRONATE SODIUM 70 MG/1
TABLET ORAL
Qty: 12 TABLET | Refills: 0 | Status: SHIPPED | OUTPATIENT
Start: 2022-08-30

## 2022-08-30 NOTE — TELEPHONE ENCOUNTER
Pt states that last time she got it was april 28th  She gets a 3 month supply and has been taking it appropriately   Please send this medication to the pharmacy

## 2022-09-01 ENCOUNTER — OFFICE VISIT (OUTPATIENT)
Dept: FAMILY MEDICINE CLINIC | Facility: CLINIC | Age: 77
End: 2022-09-01
Payer: COMMERCIAL

## 2022-09-01 VITALS
TEMPERATURE: 99.2 F | HEIGHT: 59 IN | BODY MASS INDEX: 25.08 KG/M2 | SYSTOLIC BLOOD PRESSURE: 122 MMHG | HEART RATE: 74 BPM | DIASTOLIC BLOOD PRESSURE: 60 MMHG | WEIGHT: 124.38 LBS | OXYGEN SATURATION: 96 %

## 2022-09-01 DIAGNOSIS — R19.7 DIARRHEA, UNSPECIFIED TYPE: Primary | ICD-10-CM

## 2022-09-01 DIAGNOSIS — E11.9 TYPE 2 DIABETES MELLITUS WITHOUT COMPLICATION, WITHOUT LONG-TERM CURRENT USE OF INSULIN (HCC): ICD-10-CM

## 2022-09-01 PROCEDURE — 3725F SCREEN DEPRESSION PERFORMED: CPT | Performed by: FAMILY MEDICINE

## 2022-09-01 PROCEDURE — 1160F RVW MEDS BY RX/DR IN RCRD: CPT | Performed by: FAMILY MEDICINE

## 2022-09-01 PROCEDURE — 99213 OFFICE O/P EST LOW 20 MIN: CPT | Performed by: FAMILY MEDICINE

## 2022-09-01 NOTE — PROGRESS NOTES
Assessment/Plan:      Diagnoses and all orders for this visit:    Diarrhea, unspecified type  Comments:  improved since stopping the metformin but still continues  also seeing GI and most workup has been neg thus far  hold metformin    Type 2 diabetes mellitus without complication, without long-term current use of insulin ([facility])  Comments:  continue to hold metformin, advised to improve diet and exercise  Repeat hba1c 10/30/2022 and determine need of addit meds  Orders:  -     HEMOGLOBIN A1C W/ EAG ESTIMATION; Future            Return in about 2 months (around 11/1/2022) for diarrhea  The following portions of the patient's history were reviewed and updated as appropriate: allergies, current medications, past family history, past medical history, past social history, past surgical history, and problem list      Subjective:     Patient ID: Coco Timmons is a 68 y o  female  HPI    Patient here for 1 month follow up        Reports diarrhea 3 times since stopping the metformin  Reports previously was having daily  She has lost 4 pounds since the last visit  Denies blood with the stool  Reports still loose despite having been taking the metamucil, eating more protein and taking the colestipol per GI  Given no improvement with GI meds and reported improvement once holding metformin, advise to continue to hold and see if further improvement  Odd to continue having symptoms despite 1 month of no medication  Checking sugars every day: reports 140s-150s usually  Before breakfast  Higher than they were when she was taking the metformin       PHQ-9 Depression Screening    Little interest or pleasure in doing things: 0 - not at all  Feeling down, depressed, or hopeless: 0 - not at all          Current Outpatient Medications on File Prior to Visit   Medication Sig Dispense Refill    alendronate (FOSAMAX) 70 mg tablet Take 1 tablet by mouth once a week 12 tablet 0    azelastine (OPTIVAR) 0 05 % ophthalmic solution Administer 1 drop to both eyes 2 (two) times a day 6 mL 1    buPROPion (WELLBUTRIN XL) 150 mg 24 hr tablet Take 1 tablet by mouth once daily 90 tablet 0    colestipol (COLESTID) 1 g tablet Take 1 tablet (1 g total) by mouth 2 (two) times a day 60 tablet 1    EQ Loratadine 10 MG tablet Take 1 tablet by mouth once daily 90 tablet 0    irbesartan (AVAPRO) 150 mg tablet Take 1 tablet by mouth once daily 90 tablet 0    metoprolol tartrate (LOPRESSOR) 25 mg tablet Take 1 tablet by mouth once daily 90 tablet 1    simvastatin (ZOCOR) 40 mg tablet Take 1 tablet by mouth once daily 90 tablet 0    sodium chloride (OCEAN) 0 65 % nasal spray 1 spray into each nostril as needed for congestion or rhinitis 88 mL 1    traZODone (DESYREL) 50 mg tablet TAKE 1/2 (ONE-HALF) TABLET BY MOUTH ONCE DAILY AT BEDTIME AS NEEDED FOR SLEEP 30 tablet 0     No current facility-administered medications on file prior to visit  Review of Systems      Objective:    Vitals:    09/01/22 1032   BP: 122/60   BP Location: Left arm   Patient Position: Sitting   Cuff Size: Standard   Pulse: 74   Temp: 99 2 °F (37 3 °C)   TempSrc: Tympanic   SpO2: 96%   Weight: 56 4 kg (124 lb 6 oz)   Height: 4' 11" (1 499 m)         Physical Exam  Vitals and nursing note reviewed  Constitutional:       General: She is not in acute distress  Appearance: Normal appearance  She is not ill-appearing or toxic-appearing  HENT:      Head: Normocephalic and atraumatic  Right Ear: External ear normal       Left Ear: External ear normal    Eyes:      General: No scleral icterus  Right eye: No discharge  Left eye: No discharge  Extraocular Movements: Extraocular movements intact  Conjunctiva/sclera: Conjunctivae normal    Cardiovascular:      Rate and Rhythm: Normal rate and regular rhythm  Heart sounds: Normal heart sounds  No murmur heard  No friction rub  No gallop     Pulmonary:      Effort: Pulmonary effort is normal  No respiratory distress  Breath sounds: Normal breath sounds  No wheezing or rales  Abdominal:      General: Bowel sounds are normal  There is no distension  Palpations: Abdomen is soft  There is no mass  Tenderness: There is no abdominal tenderness  There is no guarding or rebound  Neurological:      Mental Status: She is alert

## 2022-09-01 NOTE — PATIENT INSTRUCTIONS
Stay off the metformin for now    Repeat the diabetes test the hemoglobin A1c the end of October or first week of November atleast 2 days before your next visit

## 2022-09-11 DIAGNOSIS — I10 ESSENTIAL HYPERTENSION: ICD-10-CM

## 2022-09-17 DIAGNOSIS — I10 ESSENTIAL HYPERTENSION: ICD-10-CM

## 2022-09-17 RX ORDER — IRBESARTAN 150 MG/1
TABLET ORAL
Qty: 90 TABLET | Refills: 0 | Status: SHIPPED | OUTPATIENT
Start: 2022-09-17

## 2022-10-07 ENCOUNTER — OFFICE VISIT (OUTPATIENT)
Dept: GASTROENTEROLOGY | Facility: CLINIC | Age: 77
End: 2022-10-07
Payer: COMMERCIAL

## 2022-10-07 VITALS
DIASTOLIC BLOOD PRESSURE: 76 MMHG | HEART RATE: 74 BPM | SYSTOLIC BLOOD PRESSURE: 122 MMHG | RESPIRATION RATE: 20 BRPM | OXYGEN SATURATION: 94 % | WEIGHT: 126.6 LBS | TEMPERATURE: 98.1 F | HEIGHT: 59 IN | BODY MASS INDEX: 25.52 KG/M2

## 2022-10-07 DIAGNOSIS — E11.9 TYPE 2 DIABETES MELLITUS WITHOUT COMPLICATION, WITHOUT LONG-TERM CURRENT USE OF INSULIN (HCC): ICD-10-CM

## 2022-10-07 DIAGNOSIS — K64.9 HEMORRHOIDS, UNSPECIFIED HEMORRHOID TYPE: ICD-10-CM

## 2022-10-07 DIAGNOSIS — R19.7 DIARRHEA, UNSPECIFIED TYPE: Primary | ICD-10-CM

## 2022-10-07 PROCEDURE — 99214 OFFICE O/P EST MOD 30 MIN: CPT | Performed by: INTERNAL MEDICINE

## 2022-10-07 NOTE — PROGRESS NOTES
[facility] - Outpatient Consultation  Hossein Gonzalez 68 y o  female MRN: [id number]  Encounter: [id number]          ASSESSMENT AND PLAN:      1  Diarrhea, unspecified type  2  Type 2 diabetes mellitus without complication, without long-term current use of insulin ([address] )  3  Hemorrhoids, unspecified hemorrhoid type    72-year-old female with anxiety, depression, type 2 diabetes, hypertension, remote cholecystectomy presenting for follow-up regarding diarrhea  Fortunately diarrhea is overall resolved with 1-2 soft stools per day, no nocturnal complaints  This is likely from both hold metformin as well as starting fiber supplementation  She is doing well with regular Metamucil use and tolerating this without difficulty  She is happy with this plan and will continue this moving forward  Screening colonoscopy is up-to-date and given her advanced age she is not plan for repeat colonoscopy unless clinically indicated  I recommend that she follow up as needed if her diarrhea returns in which further medication trials or lifestyle modifications will be discussed  ______________________________________________________________________    HPI:  Patient is a 68 y o  female with anxiety depression, DM2, hyperlipidemia, hypertension, osteoporosis, and remote cholecystectomy who presents today for follow-up regarding diarrhea  Since her colonoscopy she had a follow-up with Faye and recommended to trial fiber  In addition metformin was discontinued  Fortunately with these changes her diarrhea is now completely resolved  She is having 1-2 soft stools per day, significantly improved since her initial presentation      Summary of HPI:   Patient was seen 06/02/2022 for evaluation of acute onset of diarrhea x5 months, described as 5-6 loose but small-volume bowel movements in the morning shortly after eating breakfast  Workup including pancreatic elastase, fecal calprotectin, TSH, celiac disease antibody profile, Giardia antigen, Cryptosporidium spare, stool enteric bacterial panel, and C diff was enitrely negative/within normal limits  Colonoscopy on 06/27 notable for normal appearing colonic mucosa s/p biopsy to rule out microscopic colitis; small internal hemorrhoids  biopsies were negative for microscopic colitis              REVIEW OF SYSTEMS:    CONSTITUTIONAL: Denies any fever, chills, rigors, and weight loss  HEENT: Denies odynophagia, tinnitus  CARDIOVASCULAR: No chest pain or palpitations  RESPIRATORY: Denies any cough, hemoptysis, shortness of breath or dyspnea on exertion  GASTROINTESTINAL: As noted in the History of Present Illness  GENITOURINARY: No problems with urination  Denies any hematuria or dysuria  NEUROLOGIC: No dizziness or vertigo, denies headaches  MUSCULOSKELETAL: Denies any muscle or joint pain  SKIN: Denies skin rashes or itching  ENDOCRINE:  Denies intolerance to heat or cold  PSYCHOSOCIAL: Denies depression or anxiety  Denies any recent memory loss  Historical Information   Past Medical History:   Diagnosis Date    Allergic     Anxiety the passed 2 yrs    Arthritis     Colon polyp     Depression     Diabetes ([address] )     Diabetes mellitus ([address] )     Hernia 2006    Rightside    HL (hearing loss)     Hx of benign breast biopsy 10/31/2018    Left breast mammogram and ultrasound 10/10/2018      Hyperlipidemia     Hypertension     Obesity     Osteoporosis the passed 8 yrs    Visual impairment      Past Surgical History:   Procedure Laterality Date    BREAST BIOPSY Left 1990    benign per patient report    BREAST BIOPSY Left 11/13/2018    benign   NO CLIP    BREAST SURGERY Left 1990    biopsy on left breast    CHOLECYSTECTOMY      EYE SURGERY  right 6/13/2018 and left 5/16/2018    HERNIA REPAIR      HYSTERECTOMY  04/15/2016    US GUIDED BREAST BIOPSY LEFT COMPLETE Left 11/13/2018     Social History   Social History     Substance and Sexual Activity   Alcohol Use No     Social History     Substance and Sexual Activity   Drug Use No     Social History     Tobacco Use   Smoking Status Never Smoker   Smokeless Tobacco Never Used     Family History   Problem Relation Age of Onset    Heart failure Mother     Arthritis Mother     Hearing loss Mother     Heart disease Mother     Hypertension Mother     Hypothyroidism Mother     Diabetes Father     Alcohol abuse Father     Dementia Father     No Known Problems Maternal Grandmother     No Known Problems Maternal Grandfather     No Known Problems Paternal Grandmother     No Known Problems Paternal Grandfather     No Known Problems Maternal Aunt     No Known Problems Maternal Aunt     No Known Problems Paternal Aunt     No Known Problems Paternal Aunt     No Known Problems Paternal Aunt     No Known Problems Paternal Aunt        Meds/Allergies       Current Outpatient Medications:     alendronate (FOSAMAX) 70 mg tablet    buPROPion (WELLBUTRIN XL) 150 mg 24 hr tablet    colestipol (COLESTID) 1 g tablet    EQ Loratadine 10 MG tablet    irbesartan (AVAPRO) 150 mg tablet    metoprolol tartrate (LOPRESSOR) 25 mg tablet    simvastatin (ZOCOR) 40 mg tablet    sodium chloride (OCEAN) 0 65 % nasal spray    traZODone (DESYREL) 50 mg tablet    azelastine (OPTIVAR) 0 05 % ophthalmic solution    No Known Allergies        Objective     Blood pressure 122/76, pulse 74, temperature 98 1 °F (36 7 °C), resp  rate 20, height 4' 11" (1 499 m), weight 57 4 kg (126 lb 9 6 oz), SpO2 94 %  Body mass index is 25 57 kg/m²  PHYSICAL EXAM:      General Appearance:   Alert, cooperative, no distress   HEENT:   Normocephalic, atraumatic, anicteric  Neck:  Supple, symmetrical, trachea midline   Lungs:   Clear to auscultation bilaterally; no rales, rhonchi or wheezing; respirations unlabored    Heart[de-identified]   Regular rate and rhythm; no murmur, rub, or gallop     Abdomen:   Soft, non-tender, non-distended; normal bowel sounds; no masses, no organomegaly    Genitalia:   Deferred    Rectal:   Deferred    Extremities:  No cyanosis, clubbing or edema    Pulses:  2+ and symmetric    Skin:  No jaundice, rashes, or lesions          Lab Results:   No visits with results within 1 Day(s) from this visit  Latest known visit with results is:   Appointment on 07/27/2022   Component Date Value    Hemoglobin A1C 07/27/2022 6 7 (A)    EAG 07/27/2022 146          Radiology Results:   No results found  Answers for HPI/ROS submitted by the patient on 9/30/2022  Progression since onset: resolved  Pain - numeric: 0/10  Radiates to: does not radiate  anorexia: No  arthralgias: No  belching: No  constipation: No  diarrhea: Yes  dysuria: No  fever: No  flatus: No  frequency: Yes  headaches: No  hematochezia: No  hematuria: No  melena: No  myalgias: No  nausea:  No  weight loss: No  vomiting: No  Aggravated by: nothing  Relieved by: nothing  Diagnostic workup: lower endoscopy

## 2022-10-09 DIAGNOSIS — G47.00 INSOMNIA, UNSPECIFIED TYPE: ICD-10-CM

## 2022-10-09 RX ORDER — TRAZODONE HYDROCHLORIDE 50 MG/1
TABLET ORAL
Qty: 30 TABLET | Refills: 0 | Status: SHIPPED | OUTPATIENT
Start: 2022-10-09 | End: 2022-12-09

## 2022-10-11 NOTE — TELEPHONE ENCOUNTER
Patient called stating that she is still having loose stools and going 6 or 7 times in the morning, she has been taken off the metformin and her BP medication and still having this issue 
Patient notified
Verified with patient she is still taking the imodium as prescribed
11-Oct-2022 13:58

## 2022-10-12 PROBLEM — Z00.00 MEDICARE ANNUAL WELLNESS VISIT, SUBSEQUENT: Status: RESOLVED | Noted: 2021-01-22 | Resolved: 2022-10-12

## 2022-10-25 ENCOUNTER — APPOINTMENT (OUTPATIENT)
Dept: LAB | Facility: CLINIC | Age: 77
End: 2022-10-25
Payer: COMMERCIAL

## 2022-10-25 DIAGNOSIS — E11.9 TYPE 2 DIABETES MELLITUS WITHOUT COMPLICATION, WITHOUT LONG-TERM CURRENT USE OF INSULIN (HCC): ICD-10-CM

## 2022-10-25 LAB
EST. AVERAGE GLUCOSE BLD GHB EST-MCNC: 134 MG/DL
HBA1C MFR BLD: 6.3 %

## 2022-10-25 PROCEDURE — 83036 HEMOGLOBIN GLYCOSYLATED A1C: CPT

## 2022-10-25 PROCEDURE — 36415 COLL VENOUS BLD VENIPUNCTURE: CPT

## 2022-10-26 ENCOUNTER — HOSPITAL ENCOUNTER (OUTPATIENT)
Dept: MAMMOGRAPHY | Facility: HOSPITAL | Age: 77
Discharge: HOME | End: 2022-10-26
Attending: SPECIALIST

## 2022-10-26 VITALS — HEIGHT: 59 IN | WEIGHT: 117 LBS | BODY MASS INDEX: 23.59 KG/M2

## 2022-10-26 DIAGNOSIS — Z12.31 SCREENING MAMMOGRAM, ENCOUNTER FOR: ICD-10-CM

## 2022-10-30 DIAGNOSIS — F32.A DEPRESSION, UNSPECIFIED DEPRESSION TYPE: ICD-10-CM

## 2022-11-02 ENCOUNTER — OFFICE VISIT (OUTPATIENT)
Dept: FAMILY MEDICINE CLINIC | Facility: CLINIC | Age: 77
End: 2022-11-02

## 2022-11-02 VITALS
HEART RATE: 68 BPM | OXYGEN SATURATION: 100 % | DIASTOLIC BLOOD PRESSURE: 76 MMHG | BODY MASS INDEX: 24.39 KG/M2 | HEIGHT: 59 IN | SYSTOLIC BLOOD PRESSURE: 124 MMHG | TEMPERATURE: 96.5 F | WEIGHT: 121 LBS

## 2022-11-02 DIAGNOSIS — R19.7 DIARRHEA, UNSPECIFIED TYPE: Primary | ICD-10-CM

## 2022-11-02 DIAGNOSIS — E11.9 TYPE 2 DIABETES MELLITUS WITHOUT COMPLICATION, WITHOUT LONG-TERM CURRENT USE OF INSULIN (HCC): ICD-10-CM

## 2022-11-02 RX ORDER — BUPROPION HYDROCHLORIDE 150 MG/1
TABLET ORAL
Qty: 90 TABLET | Refills: 0 | Status: SHIPPED | OUTPATIENT
Start: 2022-11-02

## 2022-11-02 NOTE — PROGRESS NOTES
Assessment/Plan:      Diagnoses and all orders for this visit:    Diarrhea, unspecified type  Comments:  resolved since stopping the metformin  added as an allergy    Type 2 diabetes mellitus without complication, without long-term current use of insulin ([facility] )  Comments:  pending ophtho 12/22 w/[facility] [place] office  stopped metformin  rpt hba1c in 3 months  addressed starting alternative but she wants to wait  knows to monitor FS  Orders:  -     HEMOGLOBIN A1C W/ EAG ESTIMATION; Future            Return in about 3 months (around 2/2/2023) for medicare well visit with PCP  The following portions of the patient's history were reviewed and updated as appropriate: allergies, current medications, past family history, past medical history, past social history, past surgical history, and problem list      Subjective:     Patient ID: Narcisa Jones is a 68 y o  female  HPI    Patient here for 1 month follow up after stopping metformin      Reports diarrhea 3 times since stopping the metformin  Reports previously was having daily  She has lost 4 pounds since the last visit  Denies blood with the stool  Reports still loose despite having been taking the metamucil, eating more protein and taking the colestipol per GI  Given no improvement with GI meds and reported improvement once holding metformin, advise to continue to hold and see if further improvement  Odd to continue having symptoms despite 1 month of no medication  Checking sugars every day: reports 140s-150s usually  Before breakfast  Higher than they were when she was taking the metformin         Lab Results   Component Value Date    HGBA1C 6 3 (H) 10/25/2022         PHQ-9 Depression Screening    Little interest or pleasure in doing things: 0 - not at all  Feeling down, depressed, or hopeless: 0 - not at all          Current Outpatient Medications on File Prior to Visit   Medication Sig Dispense Refill   • alendronate (FOSAMAX) 70 mg tablet Take 1 tablet by mouth once a week 12 tablet 0   • azelastine (OPTIVAR) 0 05 % ophthalmic solution Administer 1 drop to both eyes 2 (two) times a day 6 mL 1   • buPROPion (WELLBUTRIN XL) 150 mg 24 hr tablet Take 1 tablet by mouth once daily 90 tablet 0   • EQ Loratadine 10 MG tablet Take 1 tablet by mouth once daily 90 tablet 0   • irbesartan (AVAPRO) 150 mg tablet Take 1 tablet by mouth once daily 90 tablet 0   • metoprolol tartrate (LOPRESSOR) 25 mg tablet Take 1 tablet by mouth once daily 90 tablet 0   • simvastatin (ZOCOR) 40 mg tablet Take 1 tablet (40 mg total) by mouth daily 90 tablet 0   • sodium chloride (OCEAN) 0 65 % nasal spray 1 spray into each nostril as needed for congestion or rhinitis 88 mL 1   • traZODone (DESYREL) 50 mg tablet TAKE 1/2 (ONE-HALF) TABLET BY MOUTH ONCE DAILY AT BEDTIME AS NEEDED FOR SLEEP 30 tablet 0   • [DISCONTINUED] colestipol (COLESTID) 1 g tablet Take 1 tablet (1 g total) by mouth 2 (two) times a day (Patient not taking: Reported on 11/2/2022) 60 tablet 1     No current facility-administered medications on file prior to visit  Review of Systems      Objective:    Vitals:    11/02/22 1029   BP: 124/76   Pulse: 68   Temp: (!) 96 5 °F (35 8 °C)   SpO2: 100%   Weight: 54 9 kg (121 lb)   Height: 4' 11" (1 499 m)         Physical Exam  Vitals and nursing note reviewed  Constitutional:       General: She is not in acute distress  Appearance: Normal appearance  She is not ill-appearing or toxic-appearing  HENT:      Head: Normocephalic and atraumatic  Right Ear: External ear normal       Left Ear: External ear normal    Eyes:      General: No scleral icterus  Right eye: No discharge  Left eye: No discharge  Extraocular Movements: Extraocular movements intact  Conjunctiva/sclera: Conjunctivae normal    Cardiovascular:      Rate and Rhythm: Normal rate and regular rhythm  Heart sounds: Normal heart sounds  No murmur heard  No friction rub   No gallop  Pulmonary:      Effort: Pulmonary effort is normal  No respiratory distress  Breath sounds: Normal breath sounds  No wheezing or rales  Abdominal:      General: Bowel sounds are normal  There is no distension  Palpations: Abdomen is soft  Tenderness: There is no abdominal tenderness  There is no right CVA tenderness, left CVA tenderness, guarding or rebound  Neurological:      Mental Status: She is alert

## 2022-11-29 DIAGNOSIS — M81.0 OSTEOPOROSIS, UNSPECIFIED OSTEOPOROSIS TYPE, UNSPECIFIED PATHOLOGICAL FRACTURE PRESENCE: ICD-10-CM

## 2022-11-30 RX ORDER — ALENDRONATE SODIUM 70 MG/1
TABLET ORAL
Qty: 12 TABLET | Refills: 0 | Status: SHIPPED | OUTPATIENT
Start: 2022-11-30

## 2022-12-11 DIAGNOSIS — G47.00 INSOMNIA, UNSPECIFIED TYPE: ICD-10-CM

## 2022-12-11 RX ORDER — TRAZODONE HYDROCHLORIDE 50 MG/1
TABLET ORAL
Qty: 30 TABLET | Refills: 0 | Status: SHIPPED | OUTPATIENT
Start: 2022-12-11 | End: 2023-02-10

## 2022-12-18 DIAGNOSIS — I10 ESSENTIAL HYPERTENSION: ICD-10-CM

## 2022-12-22 LAB
LEFT EYE DIABETIC RETINOPATHY: NORMAL
RIGHT EYE DIABETIC RETINOPATHY: NORMAL

## 2022-12-28 RX ORDER — IRBESARTAN 150 MG/1
TABLET ORAL
Qty: 30 TABLET | Refills: 0 | Status: SHIPPED | OUTPATIENT
Start: 2022-12-28

## 2023-01-10 ENCOUNTER — OFFICE VISIT (OUTPATIENT)
Dept: FAMILY MEDICINE CLINIC | Facility: CLINIC | Age: 78
End: 2023-01-10

## 2023-01-10 VITALS
WEIGHT: 121 LBS | HEART RATE: 63 BPM | DIASTOLIC BLOOD PRESSURE: 66 MMHG | BODY MASS INDEX: 24.39 KG/M2 | OXYGEN SATURATION: 99 % | SYSTOLIC BLOOD PRESSURE: 140 MMHG | TEMPERATURE: 97.4 F | HEIGHT: 59 IN

## 2023-01-10 DIAGNOSIS — E11.9 TYPE 2 DIABETES MELLITUS WITHOUT COMPLICATION, WITHOUT LONG-TERM CURRENT USE OF INSULIN (HCC): ICD-10-CM

## 2023-01-10 DIAGNOSIS — R09.82 PND (POST-NASAL DRIP): Primary | ICD-10-CM

## 2023-01-10 RX ORDER — FLUTICASONE PROPIONATE 50 MCG
1 SPRAY, SUSPENSION (ML) NASAL 2 TIMES DAILY
Qty: 16 G | Refills: 0 | Status: SHIPPED | OUTPATIENT
Start: 2023-01-10

## 2023-01-10 NOTE — PROGRESS NOTES
Assessment/Plan:      Diagnoses and all orders for this visit:    PND (post-nasal drip)  -     fluticasone (FLONASE) 50 mcg/act nasal spray; 1 spray into each nostril 2 (two) times a day    Type 2 diabetes mellitus without complication, without long-term current use of insulin ([facility])  -     Urine Microalbumin/creatinine ratio; Future  -     Basic metabolic panel; Future          Return for Next scheduled follow up with pcp  The following portions of the patient's history were reviewed and updated as appropriate: allergies, current medications, past family history, past medical history, past social history, past surgical history, and problem list      Subjective:     Patient ID: Nancy Smith is a 68 y o  female  Sinus Problem  This is a new problem  The current episode started in the past 7 days (started friday)  The problem has been gradually improving since onset  There has been no fever  The pain is mild  Associated symptoms include ear pain (chronically off and on- has chronic perf of right ear) and sinus pressure  Pertinent negatives include no congestion, coughing or sore throat (PND)  Past treatments include saline sprays (using TID)  The treatment provided mild relief             PHQ-9 Depression Screening    Little interest or pleasure in doing things: 0 - not at all  Feeling down, depressed, or hopeless: 0 - not at all          Current Outpatient Medications on File Prior to Visit   Medication Sig Dispense Refill   • alendronate (FOSAMAX) 70 mg tablet Take 1 tablet by mouth once a week 12 tablet 0   • azelastine (OPTIVAR) 0 05 % ophthalmic solution Administer 1 drop to both eyes 2 (two) times a day 6 mL 1   • buPROPion (WELLBUTRIN XL) 150 mg 24 hr tablet Take 1 tablet by mouth once daily 90 tablet 0   • EQ Loratadine 10 MG tablet Take 1 tablet by mouth once daily 90 tablet 0   • irbesartan (AVAPRO) 150 mg tablet Take 1 tablet by mouth once daily 30 tablet 0   • metoprolol tartrate (LOPRESSOR) 25 mg tablet Take 1 tablet by mouth once daily 90 tablet 0   • simvastatin (ZOCOR) 40 mg tablet Take 1 tablet (40 mg total) by mouth daily 90 tablet 0   • sodium chloride (OCEAN) 0 65 % nasal spray 1 spray into each nostril as needed for congestion or rhinitis 88 mL 1   • traZODone (DESYREL) 50 mg tablet TAKE 1/2 (ONE-HALF) TABLET BY MOUTH ONCE DAILY AT BEDTIME AS NEEDED FOR SLEEP 30 tablet 0     No current facility-administered medications on file prior to visit  Review of Systems   HENT: Positive for ear pain (chronically off and on- has chronic perf of right ear) and sinus pressure  Negative for congestion and sore throat (PND)  Respiratory: Negative for cough  Objective:    Vitals:    01/10/23 0946   BP: 140/66   Pulse: 63   Temp: (!) 97 4 °F (36 3 °C)   TempSrc: Tympanic   SpO2: 99%   Weight: 54 9 kg (121 lb)   Height: 4' 11" (1 499 m)         Physical Exam  Vitals and nursing note reviewed  Constitutional:       General: She is not in acute distress  Appearance: Normal appearance  She is not ill-appearing or toxic-appearing  HENT:      Head: Normocephalic and atraumatic  Right Ear: Hearing, ear canal and external ear normal  Tympanic membrane is scarred and perforated  Left Ear: Hearing, ear canal and external ear normal  A middle ear effusion is present  Tympanic membrane is scarred  Nose: No congestion  Left Nostril: Occlusion present  Right Turbinates: Swollen  Right Sinus: No maxillary sinus tenderness or frontal sinus tenderness  Left Sinus: No maxillary sinus tenderness or frontal sinus tenderness  Mouth/Throat:      Mouth: Mucous membranes are moist       Pharynx: Oropharynx is clear  No oropharyngeal exudate or posterior oropharyngeal erythema  Comments: PND  Eyes:      General: No scleral icterus  Right eye: No discharge  Left eye: No discharge  Extraocular Movements: Extraocular movements intact  Conjunctiva/sclera: Conjunctivae normal       Pupils: Pupils are equal, round, and reactive to light  Cardiovascular:      Rate and Rhythm: Normal rate and regular rhythm  Heart sounds: Normal heart sounds  No murmur heard  No friction rub  No gallop  Pulmonary:      Effort: Pulmonary effort is normal  No respiratory distress  Breath sounds: Normal breath sounds  No wheezing or rales  Musculoskeletal:      Cervical back: No tenderness  Lymphadenopathy:      Cervical: No cervical adenopathy  Right cervical: No superficial or deep cervical adenopathy  Left cervical: No superficial or deep cervical adenopathy  Neurological:      Mental Status: She is alert

## 2023-01-24 DIAGNOSIS — I10 ESSENTIAL HYPERTENSION: ICD-10-CM

## 2023-01-24 DIAGNOSIS — E11.9 TYPE 2 DIABETES MELLITUS WITHOUT COMPLICATION, WITHOUT LONG-TERM CURRENT USE OF INSULIN (HCC): ICD-10-CM

## 2023-01-24 DIAGNOSIS — E78.1 HYPERTRIGLYCERIDEMIA: Primary | ICD-10-CM

## 2023-01-31 ENCOUNTER — RA CDI HCC (OUTPATIENT)
Dept: OTHER | Facility: HOSPITAL | Age: 78
End: 2023-01-31

## 2023-02-01 ENCOUNTER — APPOINTMENT (OUTPATIENT)
Dept: LAB | Facility: CLINIC | Age: 78
End: 2023-02-01

## 2023-02-01 DIAGNOSIS — E11.9 TYPE 2 DIABETES MELLITUS WITHOUT COMPLICATION, WITHOUT LONG-TERM CURRENT USE OF INSULIN (HCC): ICD-10-CM

## 2023-02-01 DIAGNOSIS — E78.1 HYPERTRIGLYCERIDEMIA: ICD-10-CM

## 2023-02-01 DIAGNOSIS — I10 ESSENTIAL HYPERTENSION: ICD-10-CM

## 2023-02-01 LAB
ALBUMIN SERPL BCP-MCNC: 3.5 G/DL (ref 3.5–5)
ALP SERPL-CCNC: 59 U/L (ref 46–116)
ALT SERPL W P-5'-P-CCNC: 35 U/L (ref 12–78)
ANION GAP SERPL CALCULATED.3IONS-SCNC: 5 MMOL/L (ref 4–13)
AST SERPL W P-5'-P-CCNC: 19 U/L (ref 5–45)
BILIRUB SERPL-MCNC: 0.54 MG/DL (ref 0.2–1)
BUN SERPL-MCNC: 18 MG/DL (ref 5–25)
CALCIUM SERPL-MCNC: 9.6 MG/DL (ref 8.3–10.1)
CHLORIDE SERPL-SCNC: 110 MMOL/L (ref 96–108)
CHOLEST SERPL-MCNC: 128 MG/DL
CO2 SERPL-SCNC: 27 MMOL/L (ref 21–32)
CREAT SERPL-MCNC: 0.84 MG/DL (ref 0.6–1.3)
CREAT UR-MCNC: 118 MG/DL
EST. AVERAGE GLUCOSE BLD GHB EST-MCNC: 128 MG/DL
GFR SERPL CREATININE-BSD FRML MDRD: 67 ML/MIN/1.73SQ M
GLUCOSE P FAST SERPL-MCNC: 137 MG/DL (ref 65–99)
HBA1C MFR BLD: 6.1 %
HDLC SERPL-MCNC: 55 MG/DL
LDLC SERPL CALC-MCNC: 36 MG/DL (ref 0–100)
MICROALBUMIN UR-MCNC: 35.2 MG/L (ref 0–20)
MICROALBUMIN/CREAT 24H UR: 30 MG/G CREATININE (ref 0–30)
NONHDLC SERPL-MCNC: 73 MG/DL
POTASSIUM SERPL-SCNC: 4.3 MMOL/L (ref 3.5–5.3)
PROT SERPL-MCNC: 6.7 G/DL (ref 6.4–8.4)
SODIUM SERPL-SCNC: 142 MMOL/L (ref 135–147)
TRIGL SERPL-MCNC: 184 MG/DL

## 2023-02-05 DIAGNOSIS — I10 ESSENTIAL HYPERTENSION: ICD-10-CM

## 2023-02-05 DIAGNOSIS — F32.A DEPRESSION, UNSPECIFIED DEPRESSION TYPE: ICD-10-CM

## 2023-02-05 RX ORDER — BUPROPION HYDROCHLORIDE 150 MG/1
TABLET ORAL
Qty: 90 TABLET | Refills: 0 | Status: SHIPPED | OUTPATIENT
Start: 2023-02-05

## 2023-02-05 RX ORDER — IRBESARTAN 150 MG/1
TABLET ORAL
Qty: 30 TABLET | Refills: 0 | Status: SHIPPED | OUTPATIENT
Start: 2023-02-05

## 2023-02-07 ENCOUNTER — OFFICE VISIT (OUTPATIENT)
Dept: FAMILY MEDICINE CLINIC | Facility: CLINIC | Age: 78
End: 2023-02-07

## 2023-02-07 VITALS
OXYGEN SATURATION: 99 % | RESPIRATION RATE: 16 BRPM | HEART RATE: 65 BPM | WEIGHT: 124.4 LBS | SYSTOLIC BLOOD PRESSURE: 120 MMHG | DIASTOLIC BLOOD PRESSURE: 74 MMHG | TEMPERATURE: 98.4 F | HEIGHT: 59 IN | BODY MASS INDEX: 25.08 KG/M2

## 2023-02-07 DIAGNOSIS — I10 ESSENTIAL HYPERTENSION: ICD-10-CM

## 2023-02-07 DIAGNOSIS — E11.9 TYPE 2 DIABETES MELLITUS WITHOUT COMPLICATION, WITHOUT LONG-TERM CURRENT USE OF INSULIN (HCC): ICD-10-CM

## 2023-02-07 DIAGNOSIS — Z71.89 ADVANCED CARE PLANNING/COUNSELING DISCUSSION: ICD-10-CM

## 2023-02-07 DIAGNOSIS — Z00.00 MEDICARE ANNUAL WELLNESS VISIT, SUBSEQUENT: Primary | ICD-10-CM

## 2023-02-07 DIAGNOSIS — M81.0 OSTEOPOROSIS, UNSPECIFIED OSTEOPOROSIS TYPE, UNSPECIFIED PATHOLOGICAL FRACTURE PRESENCE: ICD-10-CM

## 2023-02-07 DIAGNOSIS — E78.1 HYPERTRIGLYCERIDEMIA: ICD-10-CM

## 2023-02-07 RX ORDER — ALENDRONATE SODIUM 70 MG/1
70 TABLET ORAL WEEKLY
Qty: 12 TABLET | Refills: 0 | Status: SHIPPED | OUTPATIENT
Start: 2023-02-07

## 2023-02-07 NOTE — PATIENT INSTRUCTIONS
Medicare Preventive Visit Patient Instructions  Thank you for completing your Welcome to Medicare Visit or Medicare Annual Wellness Visit today  Your next wellness visit will be due in one year (2/8/2024)  The screening/preventive services that you may require over the next 5-10 years are detailed below  Some tests may not apply to you based off risk factors and/or age  Screening tests ordered at today's visit but not completed yet may show as past due  Also, please note that scanned in results may not display below  Preventive Screenings:  Service Recommendations Previous Testing/Comments   Colorectal Cancer Screening  * Colonoscopy    * Fecal Occult Blood Test (FOBT)/Fecal Immunochemical Test (FIT)  * Fecal DNA/Cologuard Test  * Flexible Sigmoidoscopy Age: 39-70 years old   Colonoscopy: every 10 years (may be performed more frequently if at higher risk)  OR  FOBT/FIT: every 1 year  OR  Cologuard: every 3 years  OR  Sigmoidoscopy: every 5 years  Screening may be recommended earlier than age 39 if at higher risk for colorectal cancer  Also, an individualized decision between you and your healthcare provider will decide whether screening between the ages of 74-80 would be appropriate  Colonoscopy: 06/27/2022  FOBT/FIT: Not on file  Cologuard: Not on file  Sigmoidoscopy: Not on file          Breast Cancer Screening Age: 36 years old  Frequency: every 1-2 years  Not required if history of left and right mastectomy Mammogram: 10/26/2022    Screening Current   Cervical Cancer Screening Between the ages of 21-29, pap smear recommended once every 3 years  Between the ages of 33-67, can perform pap smear with HPV co-testing every 5 years     Recommendations may differ for women with a history of total hysterectomy, cervical cancer, or abnormal pap smears in past  Pap Smear: Not on file    Screening Not Indicated   Hepatitis C Screening Once for adults born between 1945 and 1965  More frequently in patients at high risk for Hepatitis C Hep C Antibody: 08/27/2019    Screening Current   Diabetes Screening 1-2 times per year if you're at risk for diabetes or have pre-diabetes Fasting glucose: 137 mg/dL (2/1/2023)  A1C: 6 1 % (2/1/2023)  Screening Not Indicated  History Diabetes   Cholesterol Screening Once every 5 years if you don't have a lipid disorder  May order more often based on risk factors  Lipid panel: 02/01/2023    Screening Not Indicated  History Lipid Disorder     Other Preventive Screenings Covered by Medicare:  1  Abdominal Aortic Aneurysm (AAA) Screening: covered once if your at risk  You're considered to be at risk if you have a family history of AAA  2  Lung Cancer Screening: covers low dose CT scan once per year if you meet all of the following conditions: (1) Age 50-69; (2) No signs or symptoms of lung cancer; (3) Current smoker or have quit smoking within the last 15 years; (4) You have a tobacco smoking history of at least 20 pack years (packs per day multiplied by number of years you smoked); (5) You get a written order from a healthcare provider  3  Glaucoma Screening: covered annually if you're considered high risk: (1) You have diabetes OR (2) Family history of glaucoma OR (3)  aged 48 and older OR (3)  American aged 72 and older  3  Osteoporosis Screening: covered every 2 years if you meet one of the following conditions: (1) You're estrogen deficient and at risk for osteoporosis based off medical history and other findings; (2) Have a vertebral abnormality; (3) On glucocorticoid therapy for more than 3 months; (4) Have primary hyperparathyroidism; (5) On osteoporosis medications and need to assess response to drug therapy  · Last bone density test (DXA Scan): 02/03/2022   5  HIV Screening: covered annually if you're between the age of 15-65  Also covered annually if you are younger than 13 and older than 72 with risk factors for HIV infection   For pregnant patients, it is covered up to 3 times per pregnancy  Immunizations:  Immunization Recommendations   Influenza Vaccine Annual influenza vaccination during flu season is recommended for all persons aged >= 6 months who do not have contraindications   Pneumococcal Vaccine   * Pneumococcal conjugate vaccine = PCV13 (Prevnar 13), PCV15 (Vaxneuvance), PCV20 (Prevnar 20)  * Pneumococcal polysaccharide vaccine = PPSV23 (Pneumovax) Adults 25-60 years old: 1-3 doses may be recommended based on certain risk factors  Adults 72 years old: 1-2 doses may be recommended based off what pneumonia vaccine you previously received   Hepatitis B Vaccine 3 dose series if at intermediate or high risk (ex: diabetes, end stage renal disease, liver disease)   Tetanus (Td) Vaccine - COST NOT COVERED BY MEDICARE PART B Following completion of primary series, a booster dose should be given every 10 years to maintain immunity against tetanus  Td may also be given as tetanus wound prophylaxis  Tdap Vaccine - COST NOT COVERED BY MEDICARE PART B Recommended at least once for all adults  For pregnant patients, recommended with each pregnancy  Shingles Vaccine (Shingrix) - COST NOT COVERED BY MEDICARE PART B  2 shot series recommended in those aged 48 and above     Health Maintenance Due:      Topic Date Due   • Breast Cancer Screening: Mammogram  10/26/2024   • Hepatitis C Screening  Completed   • Colorectal Cancer Screening  Discontinued     Immunizations Due:      Topic Date Due   • COVID-19 Vaccine (5 - Booster for Rodriguez Reap series) 12/24/2022     Advance Directives   What are advance directives? Advance directives are legal documents that state your wishes and plans for medical care  These plans are made ahead of time in case you lose your ability to make decisions for yourself  Advance directives can apply to any medical decision, such as the treatments you want, and if you want to donate organs  What are the types of advance directives?   There are many types of advance directives, and each state has rules about how to use them  You may choose a combination of any of the following:  · Living will: This is a written record of the treatment you want  You can also choose which treatments you do not want, which to limit, and which to stop at a certain time  This includes surgery, medicine, IV fluid, and tube feedings  · Durable power of  for healthcare [facility]): This is a written record that states who you want to make healthcare choices for you when you are unable to make them for yourself  This person, called a proxy, is usually a family member or a friend  You may choose more than 1 proxy  · Do not resuscitate (DNR) order:  A DNR order is used in case your heart stops beating or you stop breathing  It is a request not to have certain forms of treatment, such as CPR  A DNR order may be included in other types of advance directives  · Medical directive: This covers the care that you want if you are in a coma, near death, or unable to make decisions for yourself  You can list the treatments you want for each condition  Treatment may include pain medicine, surgery, blood transfusions, dialysis, IV or tube feedings, and a ventilator (breathing machine)  · Values history: This document has questions about your views, beliefs, and how you feel and think about life  This information can help others choose the care that you would choose  Why are advance directives important? An advance directive helps you control your care  Although spoken wishes may be used, it is better to have your wishes written down  Spoken wishes can be misunderstood, or not followed  Treatments may be given even if you do not want them  An advance directive may make it easier for your family to make difficult choices about your care     Weight Management   Why it is important to manage your weight:  Being overweight increases your risk of health conditions such as heart disease, high blood pressure, type 2 diabetes, and certain types of cancer  It can also increase your risk for osteoarthritis, sleep apnea, and other respiratory problems  Aim for a slow, steady weight loss  Even a small amount of weight loss can lower your risk of health problems  How to lose weight safely:  A safe and healthy way to lose weight is to eat fewer calories and get regular exercise  You can lose up about 1 pound a week by decreasing the number of calories you eat by 500 calories each day  Healthy meal plan for weight management:  A healthy meal plan includes a variety of foods, contains fewer calories, and helps you stay healthy  A healthy meal plan includes the following:  · Eat whole-grain foods more often  A healthy meal plan should contain fiber  Fiber is the part of grains, fruits, and vegetables that is not broken down by your body  Whole-grain foods are healthy and provide extra fiber in your diet  Some examples of whole-grain foods are whole-wheat breads and pastas, oatmeal, brown rice, and bulgur  · Eat a variety of vegetables every day  Include dark, leafy greens such as spinach, kale, carolyn greens, and mustard greens  Eat yellow and orange vegetables such as carrots, sweet potatoes, and winter squash  · Eat a variety of fruits every day  Choose fresh or canned fruit (canned in its own juice or light syrup) instead of juice  Fruit juice has very little or no fiber  · Eat low-fat dairy foods  Drink fat-free (skim) milk or 1% milk  Eat fat-free yogurt and low-fat cottage cheese  Try low-fat cheeses such as mozzarella and other reduced-fat cheeses  · Choose meat and other protein foods that are low in fat  Choose beans or other legumes such as split peas or lentils  Choose fish, skinless poultry (chicken or turkey), or lean cuts of red meat (beef or pork)  Before you cook meat or poultry, cut off any visible fat  · Use less fat and oil  Try baking foods instead of frying them   Add less fat, such as margarine, sour cream, regular salad dressing and mayonnaise to foods  Eat fewer high-fat foods  Some examples of high-fat foods include french fries, doughnuts, ice cream, and cakes  · Eat fewer sweets  Limit foods and drinks that are high in sugar  This includes candy, cookies, regular soda, and sweetened drinks  Exercise:  Exercise at least 30 minutes per day on most days of the week  Some examples of exercise include walking, biking, dancing, and swimming  You can also fit in more physical activity by taking the stairs instead of the elevator or parking farther away from stores  Ask your healthcare provider about the best exercise plan for you  © Copyright [employer] 2018 Information is for End User's use only and may not be sold, redistributed or otherwise used for commercial purposes   All illustrations and images included in CareNotes® are the copyrighted property of [employer]  or [address] [place] [address]

## 2023-02-07 NOTE — PROGRESS NOTES
Assessment and Plan:     Arturo Crane was seen today for medicare wellness visit  Diagnoses and all orders for this visit:    Medicare annual wellness visit, subsequent    Advanced care planning/counseling discussion  Comments:  2 sons are POA  Reports children are aware of wishes  Reports would want to die naturally  Does not want intubation, cpr, feeding tube  Lives with one her sons  Type 2 diabetes mellitus without complication, without long-term current use of insulin ([facility])  Comments:  hba1c improved from 6 3 to 6  1  not currently on medication as the metformin gave her diarrhea  on ARB but micoralbumin creat ratio normal but increasing   Orders:  -     Diabetic foot exam; Future  -     HEMOGLOBIN A1C W/ EAG ESTIMATION; Future  -     Microalbumin / creatinine urine ratio; Future    Hypertriglyceridemia  Comments:  TG worsened from 176 to 184  patient is on simvastatin 40 mg  Advised to start low dose fenofibrate but patient would like to wait  rpt 1 yr  Orders:  -     Lipid panel; Future    Essential hypertension  Comments:  CMP largely normal  Orders:  -     Comprehensive metabolic panel; Future    Osteoporosis, unspecified osteoporosis type, unspecified pathological fracture presence  -     alendronate (FOSAMAX) 70 mg tablet; Take 1 tablet (70 mg total) by mouth once a week         Preventive health issues were discussed with patient, and age appropriate screening tests were ordered as noted in patient's After Visit Summary  Personalized health advice and appropriate referrals for health education or preventive services given if needed, as noted in patient's After Visit Summary       History of Present Illness:     Patient presents for a Medicare Wellness Visit    HPI   Patient Care Team:  Tawny Hitchcock MD as PCP - General (Family Medicine)  DO Brandy Acosta DO (Gastroenterology)     Review of Systems:     Review of Systems     Problem List:     Patient Active Problem List Diagnosis   • Type 2 diabetes mellitus without complication, without long-term current use of insulin (HCC)   • Overweight (BMI 25 0-29  9)   • Essential hypertension   • Osteoporosis   • Negative depression screening   • Hypertriglyceridemia   • Insomnia      Past Medical and Surgical History:     Past Medical History:   Diagnosis Date   • Allergic    • Anxiety the passed 2 yrs   • Arthritis    • Colon polyp    • Depression    • Diabetes ([address] )    • Diabetes mellitus ([address] )    • Hernia 2006    Rightside   • HL (hearing loss)    • Hx of benign breast biopsy 10/31/2018    Left breast mammogram and ultrasound 10/10/2018  • Hyperlipidemia    • Hypertension    • Obesity    • Osteoporosis the passed 8 yrs   • Visual impairment      Past Surgical History:   Procedure Laterality Date   • BREAST BIOPSY Left 1990    benign per patient report   • BREAST BIOPSY Left 11/13/2018    benign   NO CLIP   • BREAST SURGERY Left 1990    biopsy on left breast   • CHOLECYSTECTOMY     • EYE SURGERY  right 6/13/2018 and left 5/16/2018   • HERNIA REPAIR     • HYSTERECTOMY  04/15/2016   • US GUIDED BREAST BIOPSY LEFT COMPLETE Left 11/13/2018      Family History:     Family History   Problem Relation Age of Onset   • Heart failure Mother    • Arthritis Mother    • Hearing loss Mother    • Heart disease Mother    • Hypertension Mother    • Hypothyroidism Mother    • Diabetes Father    • Alcohol abuse Father    • Dementia Father    • Stomach cancer Maternal Grandmother    • No Known Problems Maternal Grandfather    • No Known Problems Paternal Grandmother    • No Known Problems Paternal Grandfather    • No Known Problems Maternal Aunt    • No Known Problems Maternal Aunt    • No Known Problems Paternal Aunt    • No Known Problems Paternal Aunt    • No Known Problems Paternal Aunt    • No Known Problems Paternal Aunt       Social History:     Social History     Socioeconomic History   • Marital status:       Spouse name: None   • Number of children: 2   • Years of education: None   • Highest education level: None   Occupational History   • Occupation: retired   Tobacco Use   • Smoking status: Never   • Smokeless tobacco: Never   Vaping Use   • Vaping Use: Never used   Substance and Sexual Activity   • Alcohol use: No   • Drug use: No   • Sexual activity: Not Currently   Other Topics Concern   • None   Social History Narrative   • None     Social Determinants of Health     Financial Resource Strain: Low Risk    • Difficulty of Paying Living Expenses: Not very hard   Food Insecurity: Not on file   Transportation Needs: No Transportation Needs   • Lack of Transportation (Medical): No   • Lack of Transportation (Non-Medical):  No   Physical Activity: Not on file   Stress: Not on file   Social Connections: Not on file   Intimate Partner Violence: Not on file   Housing Stability: Not on file      Medications and Allergies:     Current Outpatient Medications   Medication Sig Dispense Refill   • alendronate (FOSAMAX) 70 mg tablet Take 1 tablet (70 mg total) by mouth once a week 12 tablet 0   • azelastine (OPTIVAR) 0 05 % ophthalmic solution Administer 1 drop to both eyes 2 (two) times a day 6 mL 1   • buPROPion (WELLBUTRIN XL) 150 mg 24 hr tablet Take 1 tablet by mouth once daily 90 tablet 0   • EQ Loratadine 10 MG tablet Take 1 tablet by mouth once daily 90 tablet 0   • fluticasone (FLONASE) 50 mcg/act nasal spray 1 spray into each nostril 2 (two) times a day 16 g 0   • irbesartan (AVAPRO) 150 mg tablet Take 1 tablet by mouth once daily 30 tablet 0   • metoprolol tartrate (LOPRESSOR) 25 mg tablet Take 1 tablet by mouth once daily 90 tablet 0   • simvastatin (ZOCOR) 40 mg tablet Take 1 tablet by mouth once daily 30 tablet 0   • sodium chloride (OCEAN) 0 65 % nasal spray 1 spray into each nostril as needed for congestion or rhinitis 88 mL 1   • traZODone (DESYREL) 50 mg tablet TAKE 1/2 (ONE-HALF) TABLET BY MOUTH ONCE DAILY AT BEDTIME AS NEEDED FOR SLEEP 30 tablet 0     No current facility-administered medications for this visit  Allergies   Allergen Reactions   • Metformin Diarrhea      Immunizations:     Immunization History   Administered Date(s) Administered   • COVID-19 MODERNA VACC 0 25 ML IM BOOSTER 11/01/2021   • COVID-19 MODERNA VACC 0 5 ML IM 01/28/2021, 03/08/2021, 11/01/2021, 05/18/2022   • COVID-19 Moderna Vac BIVALENT 12 Yr+ IM (BOOSTER ONLY) 0 5 ML 10/29/2022   • INFLUENZA 09/25/2013, 09/07/2018, 09/15/2021, 10/26/2022   • Influenza Split High Dose Preservative Free IM 11/09/2017   • Influenza, high dose seasonal 0 7 mL 09/07/2018, 10/24/2019, 10/07/2020   • Influenza, seasonal, injectable 10/20/2010, 01/20/2012, 09/18/2012, 10/28/2014, 09/16/2016   • Pneumococcal Conjugate 13-Valent 11/09/2017   • Pneumococcal Polysaccharide PPV23 08/23/2010   • Tdap 06/04/2020   • Zoster Vaccine Recombinant 02/08/2022, 04/27/2022      Health Maintenance:         Topic Date Due   • Breast Cancer Screening: Mammogram  10/26/2024   • Hepatitis C Screening  Completed   • Colorectal Cancer Screening  Discontinued         Topic Date Due   • COVID-19 Vaccine (5 - Booster for Moderna series) 12/24/2022      Medicare Screening Tests and Risk Assessments:         Health Risk Assessment:   Patient rates overall health as good  Patient feels that their physical health rating is same  Patient is satisfied with their life  Eyesight was rated as slightly worse  Hearing was rated as same  Patient feels that their emotional and mental health rating is same  Patients states they are sometimes angry  Patient states they are often unusually tired/fatigued  Pain experienced in the last 7 days has been none  Patient states that she has experienced no weight loss or gain in last 6 months  Depression Screening:   PHQ-2 Score: 2      Fall Risk Screening:    In the past year, patient has experienced: no history of falling in past year      Urinary Incontinence Screening:   Patient has not leaked urine accidently in the last six months  Home Safety:  Patient does not have trouble with stairs inside or outside of their home  Patient has working smoke alarms and has working carbon monoxide detector  Home safety hazards include: none  Nutrition:   Current diet is Diabetic  Medications:   Patient is currently taking over-the-counter supplements  OTC medications include: Alive Vitamins  Patient is able to manage medications  Activities of Daily Living (ADLs)/Instrumental Activities of Daily Living (IADLs):   Walk and transfer into and out of bed and chair?: Yes  Dress and groom yourself?: Yes    Bathe or shower yourself?: Yes    Feed yourself? Yes  Do your laundry/housekeeping?: Yes  Manage your money, pay your bills and track your expenses?: Yes  Make your own meals?: Yes    Do your own shopping?: Yes    Durable Medical Equipment Suppliers  none    Previous Hospitalizations:   Any hospitalizations or ED visits within the last 12 months?: No      Advance Care Planning:   Living will: Yes    Durable POA for healthcare: Yes    Advanced directive: Yes      PREVENTIVE SCREENINGS      Cardiovascular Screening:    General: Screening Not Indicated and History Lipid Disorder      Diabetes Screening:     General: Screening Not Indicated and History Diabetes      Breast Cancer Screening:     General: Screening Current      Cervical Cancer Screening:    General: Screening Not Indicated      Osteoporosis Screening:    General: Screening Not Indicated and History Osteoporosis      Lung Cancer Screening:     General: Screening Not Indicated      Hepatitis C Screening:    General: Screening Current    Screening, Brief Intervention, and Referral to Treatment (SBIRT)    Screening  Typical number of drinks in a day: 0  Typical number of drinks in a week: 0  Interpretation: Low risk drinking behavior      AUDIT-C Screenin) How often did you have a drink containing alcohol in the past year? never  2) How many drinks did you have on a typical day when you were drinking in the past year? 0  3) How often did you have 6 or more drinks on one occasion in the past year? never    AUDIT-C Score: 0  Interpretation: Score 0-2 (female): Negative screen for alcohol misuse    Single Item Drug Screening:  How often have you used an illegal drug (including marijuana) or a prescription medication for non-medical reasons in the past year? never    Single Item Drug Screen Score: 0  Interpretation: Negative screen for possible drug use disorder    No results found  Physical Exam:     /74   Pulse 65   Temp 98 4 °F (36 9 °C) (Tympanic)   Resp 16   Ht 4' 11" (1 499 m)   Wt 56 4 kg (124 lb 6 4 oz)   SpO2 99%   BMI 25 13 kg/m²     Physical Exam  Vitals and nursing note reviewed  Constitutional:       General: She is not in acute distress  Appearance: Normal appearance  She is not ill-appearing, toxic-appearing or diaphoretic  HENT:      Head: Normocephalic and atraumatic  Right Ear: External ear normal       Left Ear: External ear normal    Eyes:      Conjunctiva/sclera: Conjunctivae normal    Cardiovascular:      Rate and Rhythm: Normal rate and regular rhythm  Pulses: Normal pulses  no weak pulses          Dorsalis pedis pulses are 2+ on the right side and 2+ on the left side  Posterior tibial pulses are 2+ on the right side and 2+ on the left side  Heart sounds: No murmur heard  No gallop  Pulmonary:      Effort: Pulmonary effort is normal  No respiratory distress  Breath sounds: Normal breath sounds  No wheezing or rales  Feet:      Right foot:      Skin integrity: No ulcer, skin breakdown, erythema, warmth, callus or dry skin  Left foot:      Skin integrity: No ulcer, skin breakdown, erythema, warmth, callus or dry skin  Neurological:      Mental Status: She is alert  Diabetic Foot Exam    Patient's shoes and socks removed      Right Foot/Ankle   Right Foot Inspection  Skin Exam: skin normal and skin intact  No dry skin, no warmth, no callus, no erythema, no maceration, no abnormal color, no pre-ulcer, no ulcer and no callus  Sensory   Monofilament testing: intact    Vascular  The right DP pulse is 2+  The right PT pulse is 2+  Left Foot/Ankle  Left Foot Inspection  Skin Exam: skin normal and skin intact  No dry skin, no warmth, no erythema, no maceration, normal color, no pre-ulcer, no ulcer and no callus  Sensory   Monofilament testing: intact    Vascular  The left DP pulse is 2+  The left PT pulse is 2+  Assign Risk Category  No deformity present  No loss of protective sensation  No weak pulses  Risk: 0    Elisabeth Virk MD     Serious Illness Conversation    1  What is your understanding now of where you are with your illness? Prognostic Understanding: no understanding of prognosis  SANDRITA sutton whom she lives with and Darian Harden is in [place]     2  How much information about what is likely to be ahead with your illness would you like to have? Information: patient wants to be fully informed     3  What did you (clinician) communicate to the patient? Prognostic Communication: Function - I hope that this is not the case, but I’m worried that this may be as strong as you will feel, and things are likely to get more difficult  4  If your health situation worsens, what are your most important goals? 5  What are the biggest fears and worries about the future and your health? 6  What abilities are so critical to your life that you cannot imagine living without them? 7  What gives you strength as you think about the future with your illness? 8  If you become sicker, how much are you willing to go through for the possibility of gaining more time? 9  How much does your proxy and family know about your priorities and wishes? Discussion Discussion: extensive discussion with family about goals and wishes        How does this plan sound to you?  I will do everything I can to help you through this       I have spent 4 minutes speaking with my patient on advanced care planning today or during this visit     Advanced directives

## 2023-02-12 DIAGNOSIS — G47.00 INSOMNIA, UNSPECIFIED TYPE: ICD-10-CM

## 2023-02-12 RX ORDER — TRAZODONE HYDROCHLORIDE 50 MG/1
TABLET ORAL
Qty: 30 TABLET | Refills: 0 | Status: SHIPPED | OUTPATIENT
Start: 2023-02-12 | End: 2023-04-14

## 2023-02-19 DIAGNOSIS — E78.00 HYPERCHOLESTEROLEMIA: ICD-10-CM

## 2023-02-19 RX ORDER — SIMVASTATIN 40 MG
TABLET ORAL
Qty: 30 TABLET | Refills: 0 | Status: SHIPPED | OUTPATIENT
Start: 2023-02-19

## 2023-02-22 DIAGNOSIS — M81.0 OSTEOPOROSIS, UNSPECIFIED OSTEOPOROSIS TYPE, UNSPECIFIED PATHOLOGICAL FRACTURE PRESENCE: ICD-10-CM

## 2023-03-03 RX ORDER — ALENDRONATE SODIUM 70 MG/1
70 TABLET ORAL WEEKLY
Qty: 12 TABLET | Refills: 1 | Status: SHIPPED | OUTPATIENT
Start: 2023-03-03 | End: 2023-08-18

## 2023-03-10 DIAGNOSIS — E78.00 HYPERCHOLESTEROLEMIA: ICD-10-CM

## 2023-03-10 DIAGNOSIS — G47.00 INSOMNIA, UNSPECIFIED TYPE: ICD-10-CM

## 2023-03-10 DIAGNOSIS — I10 ESSENTIAL HYPERTENSION: ICD-10-CM

## 2023-03-14 RX ORDER — SIMVASTATIN 40 MG
40 TABLET ORAL DAILY
Qty: 90 TABLET | Refills: 0 | Status: SHIPPED | OUTPATIENT
Start: 2023-03-14 | End: 2023-06-12

## 2023-03-14 RX ORDER — IRBESARTAN 150 MG/1
150 TABLET ORAL DAILY
Qty: 90 TABLET | Refills: 0 | Status: SHIPPED | OUTPATIENT
Start: 2023-03-14 | End: 2023-06-12

## 2023-03-14 RX ORDER — TRAZODONE HYDROCHLORIDE 50 MG/1
50 TABLET ORAL
Qty: 30 TABLET | Refills: 0 | Status: SHIPPED | OUTPATIENT
Start: 2023-03-14 | End: 2023-06-12

## 2023-03-14 NOTE — TELEPHONE ENCOUNTER
Patients insurance called requesting patients irbesartan and simvastatin to be 90 day supplies please

## 2023-04-02 DIAGNOSIS — I10 ESSENTIAL HYPERTENSION: ICD-10-CM

## 2023-05-07 DIAGNOSIS — F32.A DEPRESSION, UNSPECIFIED DEPRESSION TYPE: ICD-10-CM

## 2023-05-07 RX ORDER — BUPROPION HYDROCHLORIDE 150 MG/1
TABLET ORAL
Qty: 90 TABLET | Refills: 0 | Status: SHIPPED | OUTPATIENT
Start: 2023-05-07

## 2023-06-08 DIAGNOSIS — G47.00 INSOMNIA, UNSPECIFIED TYPE: ICD-10-CM

## 2023-06-09 RX ORDER — TRAZODONE HYDROCHLORIDE 50 MG/1
TABLET ORAL
Qty: 30 TABLET | Refills: 0 | Status: SHIPPED | OUTPATIENT
Start: 2023-06-09

## 2023-06-11 DIAGNOSIS — I10 ESSENTIAL HYPERTENSION: ICD-10-CM

## 2023-06-11 RX ORDER — IRBESARTAN 150 MG/1
TABLET ORAL
Qty: 90 TABLET | Refills: 0 | Status: SHIPPED | OUTPATIENT
Start: 2023-06-11

## 2023-06-18 DIAGNOSIS — E78.00 HYPERCHOLESTEROLEMIA: ICD-10-CM

## 2023-06-18 RX ORDER — SIMVASTATIN 40 MG
TABLET ORAL
Qty: 90 TABLET | Refills: 0 | Status: SHIPPED | OUTPATIENT
Start: 2023-06-18

## 2023-07-02 DIAGNOSIS — I10 ESSENTIAL HYPERTENSION: ICD-10-CM

## 2023-07-09 DIAGNOSIS — G47.00 INSOMNIA, UNSPECIFIED TYPE: ICD-10-CM

## 2023-07-09 RX ORDER — TRAZODONE HYDROCHLORIDE 50 MG/1
TABLET ORAL
Qty: 30 TABLET | Refills: 0 | Status: SHIPPED | OUTPATIENT
Start: 2023-07-09

## 2023-08-03 ENCOUNTER — APPOINTMENT (OUTPATIENT)
Age: 78
End: 2023-08-03
Payer: COMMERCIAL

## 2023-08-03 DIAGNOSIS — I10 ESSENTIAL HYPERTENSION: ICD-10-CM

## 2023-08-03 DIAGNOSIS — E78.1 HYPERTRIGLYCERIDEMIA: ICD-10-CM

## 2023-08-03 DIAGNOSIS — E11.9 TYPE 2 DIABETES MELLITUS WITHOUT COMPLICATION, WITHOUT LONG-TERM CURRENT USE OF INSULIN (HCC): ICD-10-CM

## 2023-08-03 LAB
ALBUMIN SERPL BCP-MCNC: 3.4 G/DL (ref 3.5–5)
ALP SERPL-CCNC: 45 U/L (ref 46–116)
ALT SERPL W P-5'-P-CCNC: 32 U/L (ref 12–78)
ANION GAP SERPL CALCULATED.3IONS-SCNC: 4 MMOL/L
AST SERPL W P-5'-P-CCNC: 15 U/L (ref 5–45)
BILIRUB SERPL-MCNC: 0.71 MG/DL (ref 0.2–1)
BUN SERPL-MCNC: 18 MG/DL (ref 5–25)
CALCIUM ALBUM COR SERPL-MCNC: 9.6 MG/DL (ref 8.3–10.1)
CALCIUM SERPL-MCNC: 9.1 MG/DL (ref 8.3–10.1)
CHLORIDE SERPL-SCNC: 111 MMOL/L (ref 96–108)
CHOLEST SERPL-MCNC: 110 MG/DL
CO2 SERPL-SCNC: 28 MMOL/L (ref 21–32)
CREAT SERPL-MCNC: 0.98 MG/DL (ref 0.6–1.3)
EST. AVERAGE GLUCOSE BLD GHB EST-MCNC: 131 MG/DL
GFR SERPL CREATININE-BSD FRML MDRD: 55 ML/MIN/1.73SQ M
GLUCOSE P FAST SERPL-MCNC: 122 MG/DL (ref 65–99)
HBA1C MFR BLD: 6.2 %
HDLC SERPL-MCNC: 49 MG/DL
LDLC SERPL CALC-MCNC: 37 MG/DL (ref 0–100)
NONHDLC SERPL-MCNC: 61 MG/DL
POTASSIUM SERPL-SCNC: 3.8 MMOL/L (ref 3.5–5.3)
PROT SERPL-MCNC: 6.7 G/DL (ref 6.4–8.4)
SODIUM SERPL-SCNC: 143 MMOL/L (ref 135–147)
TRIGL SERPL-MCNC: 120 MG/DL

## 2023-08-03 PROCEDURE — 83036 HEMOGLOBIN GLYCOSYLATED A1C: CPT

## 2023-08-03 PROCEDURE — 36415 COLL VENOUS BLD VENIPUNCTURE: CPT

## 2023-08-03 PROCEDURE — 80061 LIPID PANEL: CPT

## 2023-08-03 PROCEDURE — 80053 COMPREHEN METABOLIC PANEL: CPT

## 2023-08-10 ENCOUNTER — OFFICE VISIT (OUTPATIENT)
Dept: FAMILY MEDICINE CLINIC | Facility: CLINIC | Age: 78
End: 2023-08-10
Payer: COMMERCIAL

## 2023-08-10 VITALS
TEMPERATURE: 97.4 F | HEIGHT: 59 IN | SYSTOLIC BLOOD PRESSURE: 122 MMHG | OXYGEN SATURATION: 97 % | WEIGHT: 121 LBS | DIASTOLIC BLOOD PRESSURE: 70 MMHG | BODY MASS INDEX: 24.39 KG/M2 | HEART RATE: 75 BPM

## 2023-08-10 DIAGNOSIS — I10 ESSENTIAL HYPERTENSION: ICD-10-CM

## 2023-08-10 DIAGNOSIS — E11.9 TYPE 2 DIABETES MELLITUS WITHOUT COMPLICATION, WITHOUT LONG-TERM CURRENT USE OF INSULIN (HCC): ICD-10-CM

## 2023-08-10 DIAGNOSIS — M81.0 OSTEOPOROSIS, UNSPECIFIED OSTEOPOROSIS TYPE, UNSPECIFIED PATHOLOGICAL FRACTURE PRESENCE: ICD-10-CM

## 2023-08-10 DIAGNOSIS — N18.31 STAGE 3A CHRONIC KIDNEY DISEASE (HCC): ICD-10-CM

## 2023-08-10 DIAGNOSIS — G47.00 INSOMNIA, UNSPECIFIED TYPE: ICD-10-CM

## 2023-08-10 DIAGNOSIS — Z76.89 ESTABLISHING CARE WITH NEW DOCTOR, ENCOUNTER FOR: Primary | ICD-10-CM

## 2023-08-10 PROBLEM — Z13.31 NEGATIVE DEPRESSION SCREENING: Status: RESOLVED | Noted: 2021-01-22 | Resolved: 2023-08-10

## 2023-08-10 PROBLEM — R73.03 PREDIABETES: Status: ACTIVE | Noted: 2023-08-10

## 2023-08-10 PROCEDURE — 99214 OFFICE O/P EST MOD 30 MIN: CPT | Performed by: STUDENT IN AN ORGANIZED HEALTH CARE EDUCATION/TRAINING PROGRAM

## 2023-08-10 RX ORDER — MELATONIN
1000 DAILY
COMMUNITY

## 2023-08-10 RX ORDER — TRAZODONE HYDROCHLORIDE 50 MG/1
50 TABLET ORAL
Qty: 30 TABLET | Refills: 3 | Status: SHIPPED | OUTPATIENT
Start: 2023-08-10

## 2023-08-10 RX ORDER — DIPHENOXYLATE HYDROCHLORIDE AND ATROPINE SULFATE 2.5; .025 MG/1; MG/1
1 TABLET ORAL DAILY
COMMUNITY

## 2023-08-10 RX ORDER — OMEGA-3 FATTY ACIDS/FISH OIL 300-1000MG
1000 CAPSULE ORAL 2 TIMES DAILY
COMMUNITY

## 2023-08-10 NOTE — PROGRESS NOTES
Assessment/Plan/Follow up information       Diagnosis ICD-10-CM Associated Orders   1. Establishing care with new doctor, encounter for  Z76.89       2. Type 2 diabetes mellitus without complication, without long-term current use of insulin ([facility])  E11.9     Prediabetic last A1c 6.2      3. Essential hypertension  I10     Well-controlled continue current regimen      4. Osteoporosis, unspecified osteoporosis type, unspecified pathological fracture presence  M81.0     Most recent DEXA reviewed, continue Fosamax      5. Stage 3a chronic kidney disease (HCC)  N18.31       6. Insomnia, unspecified type  G47.00 traZODone (DESYREL) 50 mg tablet    Trazodone as needed             Patient in agreement with the plan, all questions and concerns were answered/addressed. Advised to contact me or the office with any concerns or questions. In the event of an emergency, and unable to contact a provider they are to go to the emergency room. Subjective    HPI: Is a very pleasant 19-year-old woman who presents to the office today for establishment of care and also routine checkup of her chronic conditions. She is without any concerns or issues at today's appointment denies any significant interval history and overall states that she feels well      Review of Systems   Constitutional: Negative for activity change, appetite change, chills, fatigue and fever. HENT: Negative for congestion, dental problem, drooling, ear discharge, ear pain, facial swelling, postnasal drip, rhinorrhea and sinus pain. Eyes: Negative for photophobia, pain, discharge and itching. Respiratory: Negative for apnea, cough, chest tightness and shortness of breath. Cardiovascular: Negative for chest pain and leg swelling. Gastrointestinal: Negative for abdominal distention, abdominal pain, anal bleeding, constipation, diarrhea and nausea. Endocrine: Negative for cold intolerance, heat intolerance and polydipsia.    Genitourinary: Negative for difficulty urinating. Musculoskeletal: Negative for arthralgias, gait problem, joint swelling and myalgias. Skin: Negative for color change and pallor. Allergic/Immunologic: Negative for immunocompromised state. Neurological: Negative for dizziness, seizures, facial asymmetry, weakness, light-headedness, numbness and headaches. Psychiatric/Behavioral: Negative for agitation, behavioral problems, confusion, decreased concentration and dysphoric mood. All other systems reviewed and are negative. Objective    Vitals:    08/10/23 1003   BP: 122/70   Pulse: 75   Temp: (!) 97.4 °F (36.3 °C)   SpO2: 97%         Physical Exam  Vitals and nursing note reviewed. Constitutional:       General: She is not in acute distress. Appearance: She is well-developed. HENT:      Head: Normocephalic and atraumatic. Eyes:      Conjunctiva/sclera: Conjunctivae normal.      Pupils: Pupils are equal, round, and reactive to light. Cardiovascular:      Rate and Rhythm: Normal rate and regular rhythm. Heart sounds: Normal heart sounds. No murmur heard. No friction rub. Pulmonary:      Effort: Pulmonary effort is normal.      Breath sounds: Normal breath sounds. Abdominal:      General: Bowel sounds are normal.      Palpations: Abdomen is soft. Musculoskeletal:         General: Normal range of motion. Cervical back: Normal range of motion and neck supple. Skin:     General: Skin is warm. Capillary Refill: Capillary refill takes less than 2 seconds. Neurological:      Mental Status: She is alert and oriented to person, place, and time. Motor: No abnormal muscle tone. Coordination: Coordination normal.   Psychiatric:         Behavior: Behavior normal.         Thought Content: Thought content normal.            Portions of the record may have been created with voice recognition software.  Occasional wrong word or "sound a like" substitutions may have occurred due to the inherent limitations of voice recognition software. Read the chart carefully and recognize, using context, where substitutions have occurred. Contact me with any questions.        Haleigh Celaya MD 08/10/23

## 2023-08-20 DIAGNOSIS — F32.A DEPRESSION, UNSPECIFIED DEPRESSION TYPE: ICD-10-CM

## 2023-08-20 RX ORDER — BUPROPION HYDROCHLORIDE 150 MG/1
TABLET ORAL
Qty: 90 TABLET | Refills: 0 | Status: SHIPPED | OUTPATIENT
Start: 2023-08-20

## 2023-09-12 DIAGNOSIS — E78.00 HYPERCHOLESTEROLEMIA: ICD-10-CM

## 2023-09-12 RX ORDER — SIMVASTATIN 40 MG
TABLET ORAL
Qty: 90 TABLET | Refills: 0 | Status: SHIPPED | OUTPATIENT
Start: 2023-09-12

## 2023-09-18 ENCOUNTER — TELEPHONE (OUTPATIENT)
Dept: FAMILY MEDICINE CLINIC | Facility: CLINIC | Age: 78
End: 2023-09-18

## 2023-09-18 DIAGNOSIS — I10 ESSENTIAL HYPERTENSION: ICD-10-CM

## 2023-09-18 RX ORDER — IRBESARTAN 150 MG/1
150 TABLET ORAL DAILY
Qty: 90 TABLET | Refills: 0 | Status: SHIPPED | OUTPATIENT
Start: 2023-09-18

## 2023-09-18 NOTE — TELEPHONE ENCOUNTER
Nadeen Velez left a voicemail that she has questions about her [place].  She can be reached at [phone]

## 2023-09-18 NOTE — TELEPHONE ENCOUNTER
Spoke to patient she just needs a refill on her Irbesartan 150 mg 1 daily #90 to [facility] in [facility]. However patient also questioned why stage 3 chronic kidney disease is listed on her problem list. She reports no one ever told her she has this.

## 2023-10-01 DIAGNOSIS — I10 ESSENTIAL HYPERTENSION: ICD-10-CM

## 2023-10-24 ENCOUNTER — HOSPITAL ENCOUNTER (OUTPATIENT)
Dept: CT IMAGING | Facility: HOSPITAL | Age: 78
Discharge: HOME | End: 2023-10-24
Attending: OTOLARYNGOLOGY
Payer: COMMERCIAL

## 2023-10-24 DIAGNOSIS — J34.89 SINUS PRESSURE: ICD-10-CM

## 2023-10-24 PROCEDURE — G1004 CDSM NDSC: HCPCS

## 2023-10-24 PROCEDURE — 70486 CT MAXILLOFACIAL W/O DYE: CPT

## 2023-11-07 DIAGNOSIS — M81.0 OSTEOPOROSIS, UNSPECIFIED OSTEOPOROSIS TYPE, UNSPECIFIED PATHOLOGICAL FRACTURE PRESENCE: ICD-10-CM

## 2023-11-08 ENCOUNTER — HOSPITAL ENCOUNTER (OUTPATIENT)
Dept: MAMMOGRAPHY | Facility: HOSPITAL | Age: 78
Discharge: HOME | End: 2023-11-08
Attending: SPECIALIST
Payer: COMMERCIAL

## 2023-11-08 VITALS — HEIGHT: 59 IN | WEIGHT: 121 LBS | BODY MASS INDEX: 24.39 KG/M2

## 2023-11-08 DIAGNOSIS — Z12.31 ENCOUNTER FOR SCREENING MAMMOGRAM FOR MALIGNANT NEOPLASM OF BREAST: ICD-10-CM

## 2023-11-08 PROCEDURE — 77067 SCR MAMMO BI INCL CAD: CPT

## 2023-11-08 PROCEDURE — 77063 BREAST TOMOSYNTHESIS BI: CPT

## 2023-11-08 RX ORDER — ALENDRONATE SODIUM 70 MG/1
70 TABLET ORAL WEEKLY
Qty: 12 TABLET | Refills: 0 | Status: SHIPPED | OUTPATIENT
Start: 2023-11-08

## 2023-12-10 DIAGNOSIS — G47.00 INSOMNIA, UNSPECIFIED TYPE: ICD-10-CM

## 2023-12-11 ENCOUNTER — TELEPHONE (OUTPATIENT)
Dept: FAMILY MEDICINE CLINIC | Facility: CLINIC | Age: 78
End: 2023-12-11

## 2023-12-11 RX ORDER — TRAZODONE HYDROCHLORIDE 50 MG/1
50 TABLET ORAL
Qty: 30 TABLET | Refills: 0 | Status: SHIPPED | OUTPATIENT
Start: 2023-12-11

## 2023-12-11 NOTE — TELEPHONE ENCOUNTER
Patient last seen in August, well overdue for 3-month checkup can you please make her appointment and I can address the blood pressure issues with her

## 2023-12-11 NOTE — TELEPHONE ENCOUNTER
This is Christine Peña. I'm calling about my blood pressure medicine because my blood pressure keeps going low and I was wondering if I had to stop my irbesartan My phone number's [phone].  Thank you

## 2023-12-14 ENCOUNTER — OFFICE VISIT (OUTPATIENT)
Dept: FAMILY MEDICINE CLINIC | Facility: CLINIC | Age: 78
End: 2023-12-14
Payer: COMMERCIAL

## 2023-12-14 ENCOUNTER — APPOINTMENT (OUTPATIENT)
Age: 78
End: 2023-12-14
Payer: COMMERCIAL

## 2023-12-14 VITALS
WEIGHT: 118 LBS | SYSTOLIC BLOOD PRESSURE: 118 MMHG | HEART RATE: 71 BPM | OXYGEN SATURATION: 93 % | TEMPERATURE: 96.8 F | HEIGHT: 59 IN | DIASTOLIC BLOOD PRESSURE: 64 MMHG | BODY MASS INDEX: 23.79 KG/M2

## 2023-12-14 DIAGNOSIS — I10 ESSENTIAL HYPERTENSION: Primary | ICD-10-CM

## 2023-12-14 DIAGNOSIS — R73.03 PREDIABETES: ICD-10-CM

## 2023-12-14 DIAGNOSIS — I10 ESSENTIAL HYPERTENSION: ICD-10-CM

## 2023-12-14 LAB
ALBUMIN SERPL BCP-MCNC: 4.2 G/DL (ref 3.5–5)
ALP SERPL-CCNC: 63 U/L (ref 34–104)
ALT SERPL W P-5'-P-CCNC: 25 U/L (ref 7–52)
ANION GAP SERPL CALCULATED.3IONS-SCNC: 8 MMOL/L
AST SERPL W P-5'-P-CCNC: 22 U/L (ref 13–39)
BASOPHILS # BLD AUTO: 0.04 THOUSANDS/ÂΜL (ref 0–0.1)
BASOPHILS NFR BLD AUTO: 1 % (ref 0–1)
BILIRUB SERPL-MCNC: 0.55 MG/DL (ref 0.2–1)
BUN SERPL-MCNC: 14 MG/DL (ref 5–25)
CALCIUM SERPL-MCNC: 9.6 MG/DL (ref 8.4–10.2)
CHLORIDE SERPL-SCNC: 99 MMOL/L (ref 96–108)
CO2 SERPL-SCNC: 28 MMOL/L (ref 21–32)
CREAT SERPL-MCNC: 0.85 MG/DL (ref 0.6–1.3)
CREAT UR-MCNC: 27.7 MG/DL
EOSINOPHIL # BLD AUTO: 0.25 THOUSAND/ÂΜL (ref 0–0.61)
EOSINOPHIL NFR BLD AUTO: 4 % (ref 0–6)
ERYTHROCYTE [DISTWIDTH] IN BLOOD BY AUTOMATED COUNT: 13.4 % (ref 11.6–15.1)
EST. AVERAGE GLUCOSE BLD GHB EST-MCNC: 128 MG/DL
GFR SERPL CREATININE-BSD FRML MDRD: 65 ML/MIN/1.73SQ M
GLUCOSE SERPL-MCNC: 195 MG/DL (ref 65–140)
HBA1C MFR BLD: 6.1 %
HCT VFR BLD AUTO: 45.2 % (ref 34.8–46.1)
HGB BLD-MCNC: 14.8 G/DL (ref 11.5–15.4)
IMM GRANULOCYTES # BLD AUTO: 0.02 THOUSAND/UL (ref 0–0.2)
IMM GRANULOCYTES NFR BLD AUTO: 0 % (ref 0–2)
LYMPHOCYTES # BLD AUTO: 1.59 THOUSANDS/ÂΜL (ref 0.6–4.47)
LYMPHOCYTES NFR BLD AUTO: 26 % (ref 14–44)
MCH RBC QN AUTO: 31.6 PG (ref 26.8–34.3)
MCHC RBC AUTO-ENTMCNC: 32.7 G/DL (ref 31.4–37.4)
MCV RBC AUTO: 97 FL (ref 82–98)
MICROALBUMIN UR-MCNC: <7 MG/L
MICROALBUMIN/CREAT 24H UR: <25 MG/G CREATININE (ref 0–30)
MONOCYTES # BLD AUTO: 0.62 THOUSAND/ÂΜL (ref 0.17–1.22)
MONOCYTES NFR BLD AUTO: 10 % (ref 4–12)
NEUTROPHILS # BLD AUTO: 3.51 THOUSANDS/ÂΜL (ref 1.85–7.62)
NEUTS SEG NFR BLD AUTO: 59 % (ref 43–75)
NRBC BLD AUTO-RTO: 0 /100 WBCS
PLATELET # BLD AUTO: 211 THOUSANDS/UL (ref 149–390)
PMV BLD AUTO: 10 FL (ref 8.9–12.7)
POTASSIUM SERPL-SCNC: 3.7 MMOL/L (ref 3.5–5.3)
PROT SERPL-MCNC: 6.5 G/DL (ref 6.4–8.4)
RBC # BLD AUTO: 4.68 MILLION/UL (ref 3.81–5.12)
SODIUM SERPL-SCNC: 135 MMOL/L (ref 135–147)
TSH SERPL DL<=0.05 MIU/L-ACNC: 1.52 UIU/ML (ref 0.45–4.5)
WBC # BLD AUTO: 6.03 THOUSAND/UL (ref 4.31–10.16)

## 2023-12-14 PROCEDURE — 83036 HEMOGLOBIN GLYCOSYLATED A1C: CPT

## 2023-12-14 PROCEDURE — 82043 UR ALBUMIN QUANTITATIVE: CPT

## 2023-12-14 PROCEDURE — 80053 COMPREHEN METABOLIC PANEL: CPT

## 2023-12-14 PROCEDURE — 85025 COMPLETE CBC W/AUTO DIFF WBC: CPT

## 2023-12-14 PROCEDURE — 82570 ASSAY OF URINE CREATININE: CPT

## 2023-12-14 PROCEDURE — 99213 OFFICE O/P EST LOW 20 MIN: CPT | Performed by: STUDENT IN AN ORGANIZED HEALTH CARE EDUCATION/TRAINING PROGRAM

## 2023-12-14 PROCEDURE — 84443 ASSAY THYROID STIM HORMONE: CPT

## 2023-12-14 PROCEDURE — 36415 COLL VENOUS BLD VENIPUNCTURE: CPT

## 2023-12-14 NOTE — PROGRESS NOTES
Name: Elgin Lagunas      : 1945      MRN: [id number]  Encounter Provider: Dea Galloway MD  Encounter Date: 2023   Encounter department: [facility]     1. Essential hypertension  -     HEMOGLOBIN A1C W/ EAG ESTIMATION; Future  -     TSH, 3rd generation; Future  -     CBC and differential; Future  -     Comprehensive metabolic panel; Future  -     Albumin / creatinine urine ratio; Future    2. Prediabetes  -     HEMOGLOBIN A1C W/ EAG ESTIMATION; Future    At this time we will discontinue her metoprolol we will repeat lab work to ensure there is no underlying DESHAWN or electrolyte abnormalities. Subjective      HPI            This is a pleasant 54-year-old female who presents to the office today for concerns of low blood pressure. Patient states for last few days she has been feeling weak checked her blood pressure and notes her systolics are in the 40U. She states she has been taking her medication as prescribed no changes no recent illness baseline p.o. intake. Review of Systems   Constitutional:  Negative for chills and fever. HENT:  Negative for ear pain and sore throat. Eyes:  Negative for pain and visual disturbance. Respiratory:  Negative for cough and shortness of breath. Cardiovascular:  Negative for chest pain and palpitations. Gastrointestinal:  Negative for abdominal pain and vomiting. Genitourinary:  Negative for dysuria and hematuria. Musculoskeletal:  Negative for arthralgias and back pain. Skin:  Negative for color change and rash. Neurological:  Negative for seizures and syncope. All other systems reviewed and are negative.       Current Outpatient Medications on File Prior to Visit   Medication Sig    alendronate (FOSAMAX) 70 mg tablet Take 1 tablet by mouth once a week    buPROPion (WELLBUTRIN XL) 150 mg 24 hr tablet Take 1 tablet by mouth once daily    cholecalciferol (VITAMIN D3) 1,000 units tablet Take 1,000 Units by mouth daily    ipratropium (ATROVENT) 0.06 % nasal spray 2 sprays into each nostril 2 (two) times a day    irbesartan (AVAPRO) 150 mg tablet Take 1 tablet (150 mg total) by mouth daily    multivitamin (THERAGRAN) TABS Take 1 tablet by mouth daily    Omega 3 1000 MG CAPS Take 1,000 mg by mouth 2 (two) times a day    simvastatin (ZOCOR) 40 mg tablet Take 1 tablet by mouth once daily    traZODone (DESYREL) 50 mg tablet TAKE 1 TABLET BY MOUTH ONCE DAILY AT BEDTIME AS NEEDED FOR SLEEP    [DISCONTINUED] metoprolol tartrate (LOPRESSOR) 25 mg tablet Take 1 tablet by mouth once daily    [DISCONTINUED] azelastine (OPTIVAR) 0.05 % ophthalmic solution Administer 1 drop to both eyes 2 (two) times a day       Objective     /64 (BP Location: Left arm, Patient Position: Sitting, Cuff Size: Standard)   Pulse 71   Temp (!) 96.8 °F (36 °C) (Tympanic)   Ht 4' 11" (1.499 m)   Wt 53.5 kg (118 lb)   SpO2 93%   BMI 23.83 kg/m²     Physical Exam  Vitals and nursing note reviewed. HENT:      Head: Normocephalic and atraumatic. Right Ear: Tympanic membrane normal.      Left Ear: Tympanic membrane normal.      Nose: Nose normal.      Mouth/Throat:      Mouth: Mucous membranes are moist.   Eyes:      Pupils: Pupils are equal, round, and reactive to light. Cardiovascular:      Rate and Rhythm: Normal rate and regular rhythm. Pulmonary:      Effort: Pulmonary effort is normal.   Abdominal:      General: Abdomen is flat. Musculoskeletal:         General: Normal range of motion. Cervical back: Normal range of motion. Skin:     General: Skin is warm. Capillary Refill: Capillary refill takes less than 2 seconds. Neurological:      General: No focal deficit present. Mental Status: She is alert and oriented to person, place, and time.    Psychiatric:         Mood and Affect: Mood normal.       Lia Gee MD

## 2023-12-17 DIAGNOSIS — I10 ESSENTIAL HYPERTENSION: ICD-10-CM

## 2023-12-17 DIAGNOSIS — E78.00 HYPERCHOLESTEROLEMIA: ICD-10-CM

## 2023-12-18 RX ORDER — SIMVASTATIN 40 MG
TABLET ORAL
Qty: 90 TABLET | Refills: 0 | Status: SHIPPED | OUTPATIENT
Start: 2023-12-18

## 2023-12-18 RX ORDER — IRBESARTAN 150 MG/1
150 TABLET ORAL DAILY
Qty: 90 TABLET | Refills: 0 | Status: SHIPPED | OUTPATIENT
Start: 2023-12-18

## 2024-01-07 DIAGNOSIS — G47.00 INSOMNIA, UNSPECIFIED TYPE: ICD-10-CM

## 2024-01-08 RX ORDER — TRAZODONE HYDROCHLORIDE 50 MG/1
50 TABLET ORAL
Qty: 30 TABLET | Refills: 0 | Status: SHIPPED | OUTPATIENT
Start: 2024-01-08

## 2024-01-30 DIAGNOSIS — M81.0 OSTEOPOROSIS, UNSPECIFIED OSTEOPOROSIS TYPE, UNSPECIFIED PATHOLOGICAL FRACTURE PRESENCE: ICD-10-CM

## 2024-01-31 RX ORDER — ALENDRONATE SODIUM 70 MG/1
70 TABLET ORAL WEEKLY
Qty: 12 TABLET | Refills: 0 | Status: SHIPPED | OUTPATIENT
Start: 2024-01-31

## 2024-02-04 DIAGNOSIS — G47.00 INSOMNIA, UNSPECIFIED TYPE: ICD-10-CM

## 2024-02-05 RX ORDER — TRAZODONE HYDROCHLORIDE 50 MG/1
50 TABLET ORAL
Qty: 30 TABLET | Refills: 0 | Status: SHIPPED | OUTPATIENT
Start: 2024-02-05

## 2024-02-11 DIAGNOSIS — F32.A DEPRESSION, UNSPECIFIED DEPRESSION TYPE: ICD-10-CM

## 2024-02-12 ENCOUNTER — OFFICE VISIT (OUTPATIENT)
Dept: FAMILY MEDICINE CLINIC | Facility: CLINIC | Age: 79
End: 2024-02-12
Payer: COMMERCIAL

## 2024-02-12 VITALS
BODY MASS INDEX: 23.39 KG/M2 | OXYGEN SATURATION: 97 % | DIASTOLIC BLOOD PRESSURE: 68 MMHG | HEART RATE: 96 BPM | WEIGHT: 116 LBS | SYSTOLIC BLOOD PRESSURE: 136 MMHG | HEIGHT: 59 IN

## 2024-02-12 DIAGNOSIS — Z66 DNR (DO NOT RESUSCITATE): ICD-10-CM

## 2024-02-12 DIAGNOSIS — M81.0 OSTEOPOROSIS, UNSPECIFIED OSTEOPOROSIS TYPE, UNSPECIFIED PATHOLOGICAL FRACTURE PRESENCE: ICD-10-CM

## 2024-02-12 DIAGNOSIS — Z00.00 MEDICARE ANNUAL WELLNESS VISIT, SUBSEQUENT: Primary | ICD-10-CM

## 2024-02-12 DIAGNOSIS — Z78.9 DNI (DO NOT INTUBATE): ICD-10-CM

## 2024-02-12 DIAGNOSIS — R00.0 TACHYCARDIA: ICD-10-CM

## 2024-02-12 DIAGNOSIS — N18.2 CKD (CHRONIC KIDNEY DISEASE) STAGE 2, GFR 60-89 ML/MIN: ICD-10-CM

## 2024-02-12 PROCEDURE — G0439 PPPS, SUBSEQ VISIT: HCPCS | Performed by: STUDENT IN AN ORGANIZED HEALTH CARE EDUCATION/TRAINING PROGRAM

## 2024-02-12 PROCEDURE — 99497 ADVNCD CARE PLAN 30 MIN: CPT | Performed by: STUDENT IN AN ORGANIZED HEALTH CARE EDUCATION/TRAINING PROGRAM

## 2024-02-12 RX ORDER — BUPROPION HYDROCHLORIDE 150 MG/1
TABLET ORAL
Qty: 90 TABLET | Refills: 0 | Status: SHIPPED | OUTPATIENT
Start: 2024-02-12

## 2024-02-12 RX ORDER — METOPROLOL SUCCINATE 25 MG/1
12.5 TABLET, EXTENDED RELEASE ORAL DAILY
Qty: 60 TABLET | Refills: 0 | Status: SHIPPED | OUTPATIENT
Start: 2024-02-12

## 2024-02-12 NOTE — PROGRESS NOTES
Assessment and Plan:     Problem List Items Addressed This Visit       Osteoporosis    Relevant Orders    DXA bone density spine hip and pelvis    Stage 3a chronic kidney disease (HCC)     Other Visit Diagnoses       Medicare annual wellness visit, subsequent    -  Primary    Tachycardia        Relevant Medications    metoprolol succinate (TOPROL-XL) 25 mg 24 hr tablet            Depression Screening and Follow-up Plan: Patient's depression screening was positive with a PHQ-2 score of 5. Their PHQ-9 score was 9. Clincally patient does not have depression. No treatment is required.       Preventive health issues were discussed with patient, and age appropriate screening tests were ordered as noted in patient's After Visit Summary.  Personalized health advice and appropriate referrals for health education or preventive services given if needed, as noted in patient's After Visit Summary.     History of Present Illness:     Patient presents for a Medicare Wellness Visit    HPI   Patient Care Team:  Trey Pérez MD as PCP - General (Family Medicine)  DO Chani Austin DO (Gastroenterology)     Review of Systems:     Review of Systems   Constitutional:  Negative for chills and fever.   HENT:  Negative for ear pain and sore throat.    Eyes:  Negative for pain and visual disturbance.   Respiratory:  Negative for cough and shortness of breath.    Cardiovascular:  Negative for chest pain and palpitations.   Gastrointestinal:  Negative for abdominal pain and vomiting.   Genitourinary:  Negative for dysuria and hematuria.   Musculoskeletal:  Negative for arthralgias and back pain.   Skin:  Negative for color change and rash.   Neurological:  Negative for seizures and syncope.   All other systems reviewed and are negative.       Problem List:     Patient Active Problem List   Diagnosis    Overweight (BMI 25.0-29.9)    Essential hypertension    Osteoporosis    Hypertriglyceridemia    Insomnia     Prediabetes    Stage 3a chronic kidney disease (HCC)      Past Medical and Surgical History:     Past Medical History:   Diagnosis Date    Allergic     Anxiety the passed 2 yrs    Arthritis     Colon polyp     Depression     Diabetes (HCC)     Diabetes mellitus (HCC)     Hernia 2006    Rightside    HL (hearing loss)     Hx of benign breast biopsy 10/31/2018    Left breast mammogram and ultrasound 10/10/2018.    Hyperlipidemia     Hypertension     Obesity     Osteoporosis the passed 8 yrs    Visual impairment      Past Surgical History:   Procedure Laterality Date    BREAST BIOPSY Left 1990    benign per patient report    BREAST BIOPSY Left 11/13/2018    benign   NO CLIP    BREAST SURGERY Left 1990    biopsy on left breast    CHOLECYSTECTOMY      EYE SURGERY  right 6/13/2018 and left 5/16/2018    HERNIA REPAIR      HYSTERECTOMY  04/15/2016    US GUIDED BREAST BIOPSY LEFT COMPLETE Left 11/13/2018      Family History:     Family History   Problem Relation Age of Onset    Heart failure Mother     Arthritis Mother     Hearing loss Mother     Heart disease Mother     Hypertension Mother     Hypothyroidism Mother     Diabetes Father     Alcohol abuse Father     Dementia Father     Stomach cancer Maternal Grandmother     No Known Problems Maternal Grandfather     No Known Problems Paternal Grandmother     No Known Problems Paternal Grandfather     No Known Problems Maternal Aunt     No Known Problems Maternal Aunt     No Known Problems Paternal Aunt     No Known Problems Paternal Aunt     No Known Problems Paternal Aunt     No Known Problems Paternal Aunt       Social History:     Social History     Socioeconomic History    Marital status:      Spouse name: None    Number of children: 2    Years of education: None    Highest education level: None   Occupational History    Occupation: retired   Tobacco Use    Smoking status: Never    Smokeless tobacco: Never   Vaping Use    Vaping status: Never Used   Substance and  Sexual Activity    Alcohol use: No    Drug use: No    Sexual activity: Not Currently   Other Topics Concern    None   Social History Narrative    None     Social Determinants of Health     Financial Resource Strain: Low Risk  (2/5/2024)    Overall Financial Resource Strain (CARDIA)     Difficulty of Paying Living Expenses: Not hard at all   Food Insecurity: Not on file   Transportation Needs: No Transportation Needs (2/5/2024)    PRAPARE - Transportation     Lack of Transportation (Medical): No     Lack of Transportation (Non-Medical): No   Physical Activity: Not on file   Stress: Not on file   Social Connections: Not on file   Intimate Partner Violence: Not on file   Housing Stability: Not on file      Medications and Allergies:     Current Outpatient Medications   Medication Sig Dispense Refill    alendronate (FOSAMAX) 70 mg tablet Take 1 tablet by mouth once a week 12 tablet 0    buPROPion (WELLBUTRIN XL) 150 mg 24 hr tablet Take 1 tablet by mouth once daily 90 tablet 0    cholecalciferol (VITAMIN D3) 1,000 units tablet Take 1,000 Units by mouth daily      ipratropium (ATROVENT) 0.06 % nasal spray 2 sprays into each nostril 2 (two) times a day 15 mL 11    irbesartan (AVAPRO) 150 mg tablet Take 1 tablet by mouth once daily 90 tablet 0    metoprolol succinate (TOPROL-XL) 25 mg 24 hr tablet Take 0.5 tablets (12.5 mg total) by mouth daily 60 tablet 0    multivitamin (THERAGRAN) TABS Take 1 tablet by mouth daily      Omega 3 1000 MG CAPS Take 1,000 mg by mouth 2 (two) times a day      simvastatin (ZOCOR) 40 mg tablet Take 1 tablet by mouth once daily 90 tablet 0    traZODone (DESYREL) 50 mg tablet TAKE 1 TABLET BY MOUTH ONCE DAILY AT BEDTIME AS NEEDED FOR SLEEP 30 tablet 0     No current facility-administered medications for this visit.     Allergies   Allergen Reactions    Metformin Diarrhea      Immunizations:     Immunization History   Administered Date(s) Administered    COVID-19 MODERNA VACC 0.25 ML IM BOOSTER  11/01/2021    COVID-19 MODERNA VACC 0.5 ML IM 01/28/2021, 03/08/2021, 11/01/2021, 05/18/2022    COVID-19 Moderna Vac BIVALENT 12 Yr+ IM 0.5 ML 10/29/2022    INFLUENZA 09/25/2013, 09/07/2018, 09/15/2021, 10/26/2022, 10/31/2023    Influenza Split High Dose Preservative Free IM 11/09/2017    Influenza, high dose seasonal 0.7 mL 09/07/2018, 10/24/2019, 10/07/2020    Influenza, seasonal, injectable 10/20/2010, 01/20/2012, 09/18/2012, 10/28/2014, 09/16/2016    Pneumococcal Conjugate 13-Valent 11/09/2017    Pneumococcal Polysaccharide PPV23 08/23/2010    Tdap 06/04/2020    Zoster Vaccine Recombinant 02/08/2022, 04/27/2022      Health Maintenance:         Topic Date Due    Breast Cancer Screening: Mammogram  11/08/2025    Hepatitis C Screening  Completed    Colorectal Cancer Screening  Discontinued         Topic Date Due    COVID-19 Vaccine (7 - 2023-24 season) 09/01/2023      Medicare Screening Tests and Risk Assessments:     Elza is here for her Subsequent Wellness visit.     Health Risk Assessment:   Patient rates overall health as good. Patient feels that their physical health rating is same. Patient is satisfied with their life. Eyesight was rated as slightly worse. Hearing was rated as slightly worse. Patient feels that their emotional and mental health rating is same. Patients states they are never, rarely angry. Patient states they are often unusually tired/fatigued. Pain experienced in the last 7 days has been none. Patient states that she has experienced no weight loss or gain in last 6 months.     Depression Screening:   PHQ-2 Score: 5  PHQ-9 Score: 9      Fall Risk Screening:   In the past year, patient has experienced: no history of falling in past year      Urinary Incontinence Screening:   Patient has not leaked urine accidently in the last six months.     Home Safety:  Patient does not have trouble with stairs inside or outside of their home. Patient has working smoke alarms and has working carbon  monoxide detector. Home safety hazards include: none.     Nutrition:   Current diet is Regular.     Medications:   Patient is currently taking over-the-counter supplements. OTC medications include: see medication list. Patient is able to manage medications.     Activities of Daily Living (ADLs)/Instrumental Activities of Daily Living (IADLs):   Walk and transfer into and out of bed and chair?: Yes  Dress and groom yourself?: Yes    Bathe or shower yourself?: Yes    Feed yourself? Yes  Do your laundry/housekeeping?: Yes  Manage your money, pay your bills and track your expenses?: Yes  Make your own meals?: Yes    Do your own shopping?: Yes    Durable Medical Equipment Suppliers  none    Previous Hospitalizations:   Any hospitalizations or ED visits within the last 12 months?: No      Advance Care Planning:   Living will: Yes    Durable POA for healthcare: Yes    Advanced directive: Yes    Advanced directive counseling given: Yes    ACP document given: Yes    End of Life Decisions reviewed with patient: Yes    Provider agrees with end of life decisions: Yes      PREVENTIVE SCREENINGS      Cardiovascular Screening:    General: Screening Not Indicated and History Lipid Disorder      Diabetes Screening:     General: Screening Not Indicated and History Diabetes      Breast Cancer Screening:     General: Screening Current      Cervical Cancer Screening:    General: Screening Not Indicated      Osteoporosis Screening:    General: Screening Not Indicated and History Osteoporosis      Lung Cancer Screening:     General: Screening Not Indicated      Hepatitis C Screening:    General: Screening Current    Screening, Brief Intervention, and Referral to Treatment (SBIRT)    Screening  Typical number of drinks in a day: 0  Typical number of drinks in a week: 0  Interpretation: Low risk drinking behavior.    Single Item Drug Screening:  How often have you used an illegal drug (including marijuana) or a prescription medication for  "non-medical reasons in the past year? never    Single Item Drug Screen Score: 0  Interpretation: Negative screen for possible drug use disorder    No results found.     Physical Exam:     /68 (BP Location: Left arm, Patient Position: Sitting, Cuff Size: Adult)   Pulse 96   Ht 4' 11\" (1.499 m)   Wt 52.6 kg (116 lb)   SpO2 97%   BMI 23.43 kg/m²     Physical Exam  Vitals and nursing note reviewed.   Constitutional:       General: She is not in acute distress.     Appearance: She is well-developed.   HENT:      Head: Normocephalic and atraumatic.   Eyes:      Conjunctiva/sclera: Conjunctivae normal.      Pupils: Pupils are equal, round, and reactive to light.   Cardiovascular:      Rate and Rhythm: Normal rate and regular rhythm.      Heart sounds: Normal heart sounds. No murmur heard.     No friction rub.   Pulmonary:      Effort: Pulmonary effort is normal.      Breath sounds: Normal breath sounds.   Abdominal:      General: Bowel sounds are normal.      Palpations: Abdomen is soft.   Musculoskeletal:         General: Normal range of motion.      Cervical back: Normal range of motion and neck supple.   Skin:     General: Skin is warm.      Capillary Refill: Capillary refill takes less than 2 seconds.   Neurological:      Mental Status: She is alert and oriented to person, place, and time.      Motor: No abnormal muscle tone.      Coordination: Coordination normal.   Psychiatric:         Behavior: Behavior normal.         Thought Content: Thought content normal.          Trey Pérez MD  "

## 2024-02-12 NOTE — PATIENT INSTRUCTIONS
Medicare Preventive Visit Patient Instructions  Thank you for completing your Welcome to Medicare Visit or Medicare Annual Wellness Visit today. Your next wellness visit will be due in one year (2/12/2025).  The screening/preventive services that you may require over the next 5-10 years are detailed below. Some tests may not apply to you based off risk factors and/or age. Screening tests ordered at today's visit but not completed yet may show as past due. Also, please note that scanned in results may not display below.  Preventive Screenings:  Service Recommendations Previous Testing/Comments   Colorectal Cancer Screening  * Colonoscopy    * Fecal Occult Blood Test (FOBT)/Fecal Immunochemical Test (FIT)  * Fecal DNA/Cologuard Test  * Flexible Sigmoidoscopy Age: 45-75 years old   Colonoscopy: every 10 years (may be performed more frequently if at higher risk)  OR  FOBT/FIT: every 1 year  OR  Cologuard: every 3 years  OR  Sigmoidoscopy: every 5 years  Screening may be recommended earlier than age 45 if at higher risk for colorectal cancer. Also, an individualized decision between you and your healthcare provider will decide whether screening between the ages of 76-85 would be appropriate. Colonoscopy: 06/27/2022  FOBT/FIT: Not on file  Cologuard: Not on file  Sigmoidoscopy: Not on file          Breast Cancer Screening Age: 40+ years old  Frequency: every 1-2 years  Not required if history of left and right mastectomy Mammogram: 11/08/2023        Cervical Cancer Screening Between the ages of 21-29, pap smear recommended once every 3 years.   Between the ages of 30-65, can perform pap smear with HPV co-testing every 5 years.   Recommendations may differ for women with a history of total hysterectomy, cervical cancer, or abnormal pap smears in past. Pap Smear: Not on file        Hepatitis C Screening Once for adults born between 1945 and 1965  More frequently in patients at high risk for Hepatitis C Hep C Antibody:  08/27/2019        Diabetes Screening 1-2 times per year if you're at risk for diabetes or have pre-diabetes Fasting glucose: 122 mg/dL (8/3/2023)  A1C: 6.1 % (12/14/2023)      Cholesterol Screening Once every 5 years if you don't have a lipid disorder. May order more often based on risk factors. Lipid panel: 08/03/2023          Other Preventive Screenings Covered by Medicare:  Abdominal Aortic Aneurysm (AAA) Screening: covered once if your at risk. You're considered to be at risk if you have a family history of AAA.  Lung Cancer Screening: covers low dose CT scan once per year if you meet all of the following conditions: (1) Age 55-77; (2) No signs or symptoms of lung cancer; (3) Current smoker or have quit smoking within the last 15 years; (4) You have a tobacco smoking history of at least 20 pack years (packs per day multiplied by number of years you smoked); (5) You get a written order from a healthcare provider.  Glaucoma Screening: covered annually if you're considered high risk: (1) You have diabetes OR (2) Family history of glaucoma OR (3)  aged 50 and older OR (4)  American aged 65 and older  Osteoporosis Screening: covered every 2 years if you meet one of the following conditions: (1) You're estrogen deficient and at risk for osteoporosis based off medical history and other findings; (2) Have a vertebral abnormality; (3) On glucocorticoid therapy for more than 3 months; (4) Have primary hyperparathyroidism; (5) On osteoporosis medications and need to assess response to drug therapy.   Last bone density test (DXA Scan): 02/03/2022.  HIV Screening: covered annually if you're between the age of 15-65. Also covered annually if you are younger than 15 and older than 65 with risk factors for HIV infection. For pregnant patients, it is covered up to 3 times per pregnancy.    Immunizations:  Immunization Recommendations   Influenza Vaccine Annual influenza vaccination during flu season is  recommended for all persons aged >= 6 months who do not have contraindications   Pneumococcal Vaccine   * Pneumococcal conjugate vaccine = PCV13 (Prevnar 13), PCV15 (Vaxneuvance), PCV20 (Prevnar 20)  * Pneumococcal polysaccharide vaccine = PPSV23 (Pneumovax) Adults 19-65 yo with certain risk factors or if 65+ yo  If never received any pneumonia vaccine: recommend Prevnar 20 (PCV20)  Give PCV20 if previously received 1 dose of PCV13 or PPSV23   Hepatitis B Vaccine 3 dose series if at intermediate or high risk (ex: diabetes, end stage renal disease, liver disease)   Respiratory syncytial virus (RSV) Vaccine - COVERED BY MEDICARE PART D  * RSVPreF3 (Arexvy) CDC recommends that adults 60 years of age and older may receive a single dose of RSV vaccine using shared clinical decision-making (SCDM)   Tetanus (Td) Vaccine - COST NOT COVERED BY MEDICARE PART B Following completion of primary series, a booster dose should be given every 10 years to maintain immunity against tetanus. Td may also be given as tetanus wound prophylaxis.   Tdap Vaccine - COST NOT COVERED BY MEDICARE PART B Recommended at least once for all adults. For pregnant patients, recommended with each pregnancy.   Shingles Vaccine (Shingrix) - COST NOT COVERED BY MEDICARE PART B  2 shot series recommended in those 19 years and older who have or will have weakened immune systems or those 50 years and older     Health Maintenance Due:      Topic Date Due   • Breast Cancer Screening: Mammogram  11/08/2025   • Hepatitis C Screening  Completed   • Colorectal Cancer Screening  Discontinued     Immunizations Due:      Topic Date Due   • COVID-19 Vaccine (7 - 2023-24 season) 09/01/2023     Advance Directives   What are advance directives?  Advance directives are legal documents that state your wishes and plans for medical care. These plans are made ahead of time in case you lose your ability to make decisions for yourself. Advance directives can apply to any medical  decision, such as the treatments you want, and if you want to donate organs.   What are the types of advance directives?  There are many types of advance directives, and each state has rules about how to use them. You may choose a combination of any of the following:  Living will:  This is a written record of the treatment you want. You can also choose which treatments you do not want, which to limit, and which to stop at a certain time. This includes surgery, medicine, IV fluid, and tube feedings.   Durable power of  for healthcare (DPAHC):  This is a written record that states who you want to make healthcare choices for you when you are unable to make them for yourself. This person, called a proxy, is usually a family member or a friend. You may choose more than 1 proxy.  Do not resuscitate (DNR) order:  A DNR order is used in case your heart stops beating or you stop breathing. It is a request not to have certain forms of treatment, such as CPR. A DNR order may be included in other types of advance directives.  Medical directive:  This covers the care that you want if you are in a coma, near death, or unable to make decisions for yourself. You can list the treatments you want for each condition. Treatment may include pain medicine, surgery, blood transfusions, dialysis, IV or tube feedings, and a ventilator (breathing machine).  Values history:  This document has questions about your views, beliefs, and how you feel and think about life. This information can help others choose the care that you would choose.  Why are advance directives important?  An advance directive helps you control your care. Although spoken wishes may be used, it is better to have your wishes written down. Spoken wishes can be misunderstood, or not followed. Treatments may be given even if you do not want them. An advance directive may make it easier for your family to make difficult choices about your care.       © Copyright [employer] 2018 Information is for End User's use only and may not be sold, redistributed or otherwise used for commercial purposes. All illustrations and images included in CareNotes® are the copyrighted property of A.D.A[employer]M., Inc[employer] or [employer]

## 2024-02-12 NOTE — ACP (ADVANCE CARE PLANNING)
Advanced Care Planning Progress Note    POLST form complete signed DNR/DNI    Serious Illness Conversation    1. What is your understanding now of where you are with your illness?  Prognostic Understanding: appropriate understanding of prognosis     2. How much information about what is likely to be ahead with your illness would you like to have?  Information: patient wants to be fully informed     3. What did you (clinician) communicate to the patient?  Prognostic Communication: Uncertain - It can be difficult to predict what will happen with your illness. I hope you will continue to live well for a long time but I’m worried that you could get sick quickly, and I think it is important to prepare for that possibility.     4. If your health situation worsens, what are your most important goals?     5. What are the biggest fears and worries about the future and your health?     6. What abilities are so critical to your life that you cannot imagine living without them?     7. What gives you strength as you think about the future with your illness?     8. If you become sicker, how much are you willing to go through for the possibility of gaining more time?  Be in the hospital: Yes Have a feeding tube: No   Be in the ICU: No Live in a nursing home: No   Be on a ventilator: No Be uncomfortable: No   Be on dialysis: No Undergo aggressive test and/or procedures: No   9. How much does your proxy and family know about your priorities and wishes?  Discussion Discussion: extensive discussion with family about goals and wishes        How does this plan sound to you? I will do everything I can to help you through this.  Patient verbalized understanding of the plan     I have spent 20minutes speaking with my patient on advanced care planning today or during this visit     Advanced directives  Five Wishes: Patient has Five WIshes in chart         Trey Pérez MD

## 2024-02-21 ENCOUNTER — HOSPITAL ENCOUNTER (OUTPATIENT)
Dept: BONE DENSITY | Facility: HOSPITAL | Age: 79
Discharge: HOME | End: 2024-02-21
Payer: COMMERCIAL

## 2024-02-21 VITALS — HEIGHT: 59 IN | WEIGHT: 114 LBS | BODY MASS INDEX: 22.98 KG/M2

## 2024-02-21 DIAGNOSIS — M81.0 OSTEOPOROSIS, UNSPECIFIED OSTEOPOROSIS TYPE, UNSPECIFIED PATHOLOGICAL FRACTURE PRESENCE: ICD-10-CM

## 2024-02-21 PROCEDURE — 77080 DXA BONE DENSITY AXIAL: CPT

## 2024-03-03 DIAGNOSIS — G47.00 INSOMNIA, UNSPECIFIED TYPE: ICD-10-CM

## 2024-03-04 RX ORDER — TRAZODONE HYDROCHLORIDE 50 MG/1
50 TABLET ORAL
Qty: 30 TABLET | Refills: 0 | Status: SHIPPED | OUTPATIENT
Start: 2024-03-04

## 2024-03-17 DIAGNOSIS — E78.00 HYPERCHOLESTEROLEMIA: ICD-10-CM

## 2024-03-18 RX ORDER — SIMVASTATIN 40 MG
TABLET ORAL
Qty: 90 TABLET | Refills: 0 | Status: SHIPPED | OUTPATIENT
Start: 2024-03-18

## 2024-03-24 DIAGNOSIS — I10 ESSENTIAL HYPERTENSION: ICD-10-CM

## 2024-03-25 RX ORDER — IRBESARTAN 150 MG/1
150 TABLET ORAL DAILY
Qty: 90 TABLET | Refills: 0 | Status: SHIPPED | OUTPATIENT
Start: 2024-03-25

## 2024-04-01 DIAGNOSIS — G47.00 INSOMNIA, UNSPECIFIED TYPE: ICD-10-CM

## 2024-04-01 RX ORDER — TRAZODONE HYDROCHLORIDE 50 MG/1
50 TABLET ORAL
Qty: 30 TABLET | Refills: 0 | Status: SHIPPED | OUTPATIENT
Start: 2024-04-01

## 2024-04-26 PROBLEM — I12.9 HYPERTENSIVE KIDNEY DISEASE: Status: ACTIVE | Noted: 2024-04-26

## 2024-05-05 DIAGNOSIS — G47.00 INSOMNIA, UNSPECIFIED TYPE: ICD-10-CM

## 2024-05-05 RX ORDER — TRAZODONE HYDROCHLORIDE 50 MG/1
50 TABLET ORAL
Qty: 30 TABLET | Refills: 0 | Status: SHIPPED | OUTPATIENT
Start: 2024-05-05

## 2024-05-07 DIAGNOSIS — R00.0 TACHYCARDIA: ICD-10-CM

## 2024-05-07 DIAGNOSIS — M81.0 OSTEOPOROSIS, UNSPECIFIED OSTEOPOROSIS TYPE, UNSPECIFIED PATHOLOGICAL FRACTURE PRESENCE: ICD-10-CM

## 2024-05-07 RX ORDER — METOPROLOL SUCCINATE 25 MG/1
12.5 TABLET, EXTENDED RELEASE ORAL DAILY
Qty: 45 TABLET | Refills: 1 | Status: SHIPPED | OUTPATIENT
Start: 2024-05-07

## 2024-05-08 RX ORDER — ALENDRONATE SODIUM 70 MG/1
70 TABLET ORAL WEEKLY
Qty: 4 TABLET | Refills: 0 | Status: SHIPPED | OUTPATIENT
Start: 2024-05-08

## 2024-05-08 NOTE — TELEPHONE ENCOUNTER
Hey,     We are all new to PODs and I'm trying to figure out your protocol here..... so you placed a courtesy refill because patient did not have a recent vit d level?  Is this a protocol? Because USPTSF as well as the AAFP do not recommend serial or even initial evaluation of Vit D levels if there is low suspicion for secondary causes of osteoporosis.... based on medical guidelines, treatment should not be withheld pending repeat or even initial vit d levels.     Thanks  Dr. Pérez

## 2024-05-19 DIAGNOSIS — F32.A DEPRESSION, UNSPECIFIED DEPRESSION TYPE: ICD-10-CM

## 2024-05-19 RX ORDER — BUPROPION HYDROCHLORIDE 150 MG/1
TABLET ORAL
Qty: 90 TABLET | Refills: 0 | Status: SHIPPED | OUTPATIENT
Start: 2024-05-19

## 2024-05-31 DIAGNOSIS — M81.0 OSTEOPOROSIS, UNSPECIFIED OSTEOPOROSIS TYPE, UNSPECIFIED PATHOLOGICAL FRACTURE PRESENCE: ICD-10-CM

## 2024-06-01 RX ORDER — ALENDRONATE SODIUM 70 MG/1
70 TABLET ORAL WEEKLY
Qty: 4 TABLET | Refills: 0 | Status: SHIPPED | OUTPATIENT
Start: 2024-06-01

## 2024-06-02 DIAGNOSIS — G47.00 INSOMNIA, UNSPECIFIED TYPE: ICD-10-CM

## 2024-06-02 RX ORDER — TRAZODONE HYDROCHLORIDE 50 MG/1
50 TABLET ORAL
Qty: 30 TABLET | Refills: 5 | Status: SHIPPED | OUTPATIENT
Start: 2024-06-02

## 2024-06-16 DIAGNOSIS — F32.A DEPRESSION, UNSPECIFIED DEPRESSION TYPE: ICD-10-CM

## 2024-06-16 DIAGNOSIS — E78.00 HYPERCHOLESTEROLEMIA: ICD-10-CM

## 2024-06-16 RX ORDER — SIMVASTATIN 40 MG
TABLET ORAL
Qty: 90 TABLET | Refills: 1 | Status: SHIPPED | OUTPATIENT
Start: 2024-06-16

## 2024-06-16 RX ORDER — BUPROPION HYDROCHLORIDE 150 MG/1
TABLET ORAL
Qty: 90 TABLET | Refills: 1 | Status: SHIPPED | OUTPATIENT
Start: 2024-06-16

## 2024-07-01 DIAGNOSIS — M81.0 OSTEOPOROSIS, UNSPECIFIED OSTEOPOROSIS TYPE, UNSPECIFIED PATHOLOGICAL FRACTURE PRESENCE: ICD-10-CM

## 2024-07-01 RX ORDER — ALENDRONATE SODIUM 70 MG/1
70 TABLET ORAL WEEKLY
Qty: 4 TABLET | Refills: 0 | Status: SHIPPED | OUTPATIENT
Start: 2024-07-01

## 2024-07-01 NOTE — TELEPHONE ENCOUNTER
Patient needs updated blood work. Please place orders. A courtesy refill was provided.     Vitamin D

## 2024-07-09 DIAGNOSIS — I10 ESSENTIAL HYPERTENSION: ICD-10-CM

## 2024-07-09 RX ORDER — IRBESARTAN 150 MG/1
150 TABLET ORAL DAILY
Qty: 100 TABLET | Refills: 1 | Status: SHIPPED | OUTPATIENT
Start: 2024-07-09

## 2024-07-23 ENCOUNTER — APPOINTMENT (OUTPATIENT)
Dept: RADIOLOGY | Facility: CLINIC | Age: 79
End: 2024-07-23
Payer: COMMERCIAL

## 2024-07-23 ENCOUNTER — OFFICE VISIT (OUTPATIENT)
Dept: FAMILY MEDICINE CLINIC | Facility: CLINIC | Age: 79
End: 2024-07-23
Payer: COMMERCIAL

## 2024-07-23 VITALS
HEART RATE: 101 BPM | SYSTOLIC BLOOD PRESSURE: 128 MMHG | BODY MASS INDEX: 24.8 KG/M2 | OXYGEN SATURATION: 99 % | TEMPERATURE: 97.6 F | DIASTOLIC BLOOD PRESSURE: 62 MMHG | HEIGHT: 59 IN | WEIGHT: 123 LBS

## 2024-07-23 DIAGNOSIS — J06.9 UPPER RESPIRATORY TRACT INFECTION, UNSPECIFIED TYPE: Primary | ICD-10-CM

## 2024-07-23 DIAGNOSIS — J06.9 UPPER RESPIRATORY TRACT INFECTION, UNSPECIFIED TYPE: ICD-10-CM

## 2024-07-23 LAB
SARS-COV-2 AG UPPER RESP QL IA: NEGATIVE
VALID CONTROL: NORMAL

## 2024-07-23 PROCEDURE — 71046 X-RAY EXAM CHEST 2 VIEWS: CPT

## 2024-07-23 PROCEDURE — G2211 COMPLEX E/M VISIT ADD ON: HCPCS | Performed by: STUDENT IN AN ORGANIZED HEALTH CARE EDUCATION/TRAINING PROGRAM

## 2024-07-23 PROCEDURE — 87811 SARS-COV-2 COVID19 W/OPTIC: CPT | Performed by: STUDENT IN AN ORGANIZED HEALTH CARE EDUCATION/TRAINING PROGRAM

## 2024-07-23 PROCEDURE — 99215 OFFICE O/P EST HI 40 MIN: CPT | Performed by: STUDENT IN AN ORGANIZED HEALTH CARE EDUCATION/TRAINING PROGRAM

## 2024-07-23 RX ORDER — DOXYCYCLINE HYCLATE 100 MG/1
100 CAPSULE ORAL EVERY 12 HOURS SCHEDULED
Qty: 14 CAPSULE | Refills: 0 | Status: SHIPPED | OUTPATIENT
Start: 2024-07-23 | End: 2024-07-30

## 2024-07-23 RX ORDER — PREDNISONE 20 MG/1
40 TABLET ORAL DAILY
Qty: 10 TABLET | Refills: 0 | Status: SHIPPED | OUTPATIENT
Start: 2024-07-23 | End: 2024-07-28

## 2024-07-23 NOTE — PROGRESS NOTES
Ambulatory Visit  Name: Elza Kaplan      : 1945      MRN: [id number]  Encounter Provider: Trey Pérez MD  Encounter Date: 2024   Encounter department: [facility] PRIMARY CARE    Assessment & Plan   1. Upper respiratory tract infection, unspecified type  -     POCT Rapid Covid Ag  -     XR chest pa & lateral; Future; Expected date: 2024  -     predniSONE 20 mg tablet; Take 2 tablets (40 mg total) by mouth daily for 5 days  -     doxycycline hyclate (VIBRAMYCIN) 100 mg capsule; Take 1 capsule (100 mg total) by mouth every 12 (twelve) hours for 7 days      Although patient denies subjective shortness of breath she appears to be tachypneic on exam.  Fortunately hemodynamically stable saturations of 99%.  I did walk the patient approximately 200 feet and she was maintaining adequate saturations on room air.  I discussed with her given her age she is at high risk for respiratory complications and at this point she is teetering on requiring emergency department referral.  Given her comorbid history and her appearance, I generally err on the side of caution and recommended she go to the emergency department for evaluation however  Patient would very much prefer not to go to the emergency room and would like to try treating as an outpatient.  Although I have no obvious source of infection given her age and her appearance we will start her on a steroid and also antibiotic for presumptive pneumonia.  Discussed with her that she should proceed to get an x-ray today which I will review.  We discussed risk benefit of proceeding to the emergency department for higher level of care and more immediate treatment, knowledge and was deemed competent.  I advised any changes in her symptoms or if she is not feeling improved in 1 to 2 days she is to go to the emergency department.  I did ask that she follow-up here in the office in 2 days.  Unfortunately I will not be in the office in 2 days by I  "will have her see the nurse practitioner for reevaluation, who I discussed the case with.       History of Present Illness     HPI    This a pleasant 79-year-old female presents the office with approximately 5 days of URI-like symptoms consisting of rhinorrhea, postnasal drip, dry nonproductive cough.  Her main complaint this time is severe fatigue, exhaustion.  She denies no shortness of breath, chest pain, fevers, chills.  Denies any sick contacts.  Has not taken at home testing.    Review of Systems   Constitutional:  Positive for fatigue. Negative for activity change, appetite change, chills and fever.   HENT:  Positive for ear pain, postnasal drip, rhinorrhea and sinus pain. Negative for congestion, dental problem, drooling, ear discharge and facial swelling.    Eyes:  Negative for photophobia, pain, discharge and itching.   Respiratory:  Positive for cough. Negative for apnea, chest tightness and shortness of breath.    Cardiovascular:  Negative for chest pain and leg swelling.   Gastrointestinal:  Negative for abdominal distention, abdominal pain, anal bleeding, constipation, diarrhea and nausea.   Endocrine: Negative for cold intolerance, heat intolerance and polydipsia.   Genitourinary:  Negative for difficulty urinating.   Musculoskeletal:  Negative for arthralgias, gait problem, joint swelling and myalgias.   Skin:  Negative for color change and pallor.   Allergic/Immunologic: Negative for immunocompromised state.   Neurological:  Negative for dizziness, seizures, facial asymmetry, weakness, light-headedness, numbness and headaches.   Psychiatric/Behavioral:  Negative for agitation, behavioral problems, confusion, decreased concentration and dysphoric mood.    All other systems reviewed and are negative.      Objective     /62 (BP Location: Left arm, Patient Position: Sitting, Cuff Size: Large)   Pulse 101   Temp 97.6 °F (36.4 °C) (Tympanic)   Ht 4' 11\" (1.499 m)   Wt 55.8 kg (123 lb)   SpO2 " 99%   BMI 24.84 kg/m²     Physical Exam  Vitals and nursing note reviewed.   Constitutional:       General: She is not in acute distress.     Appearance: She is well-developed. She is ill-appearing.   HENT:      Head: Normocephalic and atraumatic.      Right Ear: Tympanic membrane normal.      Left Ear: Tympanic membrane normal.      Nose: Congestion and rhinorrhea present.   Eyes:      Conjunctiva/sclera: Conjunctivae normal.      Pupils: Pupils are equal, round, and reactive to light.   Cardiovascular:      Rate and Rhythm: Normal rate and regular rhythm.      Heart sounds: Normal heart sounds. No murmur heard.     No friction rub.   Pulmonary:      Effort: Pulmonary effort is normal.      Breath sounds: Normal breath sounds.      Comments: Tachypnea  Abdominal:      General: Bowel sounds are normal.      Palpations: Abdomen is soft.   Musculoskeletal:         General: No swelling, tenderness, deformity or signs of injury. Normal range of motion.      Cervical back: Normal range of motion and neck supple.   Skin:     General: Skin is warm.      Capillary Refill: Capillary refill takes less than 2 seconds.      Coloration: Skin is pale.   Neurological:      Mental Status: She is alert and oriented to person, place, and time.      Motor: No abnormal muscle tone.      Coordination: Coordination normal.   Psychiatric:         Behavior: Behavior normal.         Thought Content: Thought content normal.       Administrative Statements

## 2024-07-25 ENCOUNTER — APPOINTMENT (EMERGENCY)
Dept: RADIOLOGY | Facility: HOSPITAL | Age: 79
End: 2024-07-25
Payer: COMMERCIAL

## 2024-07-25 ENCOUNTER — OFFICE VISIT (OUTPATIENT)
Dept: FAMILY MEDICINE CLINIC | Facility: CLINIC | Age: 79
End: 2024-07-25
Payer: COMMERCIAL

## 2024-07-25 ENCOUNTER — HOSPITAL ENCOUNTER (EMERGENCY)
Facility: HOSPITAL | Age: 79
Discharge: HOME | End: 2024-07-25
Attending: EMERGENCY MEDICINE
Payer: COMMERCIAL

## 2024-07-25 ENCOUNTER — APPOINTMENT (EMERGENCY)
Dept: CT IMAGING | Facility: HOSPITAL | Age: 79
End: 2024-07-25
Payer: COMMERCIAL

## 2024-07-25 VITALS
OXYGEN SATURATION: 99 % | WEIGHT: 122 LBS | HEART RATE: 75 BPM | TEMPERATURE: 98 F | SYSTOLIC BLOOD PRESSURE: 106 MMHG | HEIGHT: 59 IN | BODY MASS INDEX: 24.6 KG/M2 | DIASTOLIC BLOOD PRESSURE: 53 MMHG | RESPIRATION RATE: 26 BRPM

## 2024-07-25 VITALS
DIASTOLIC BLOOD PRESSURE: 70 MMHG | BODY MASS INDEX: 24.6 KG/M2 | WEIGHT: 122 LBS | HEIGHT: 59 IN | HEART RATE: 80 BPM | TEMPERATURE: 98.6 F | OXYGEN SATURATION: 98 % | SYSTOLIC BLOOD PRESSURE: 122 MMHG

## 2024-07-25 DIAGNOSIS — R06.02 SOB (SHORTNESS OF BREATH): Primary | ICD-10-CM

## 2024-07-25 DIAGNOSIS — J84.112 UIP (USUAL INTERSTITIAL PNEUMONITIS) (HCC): Primary | ICD-10-CM

## 2024-07-25 LAB
2HR DELTA HS TROPONIN: 0 NG/L
ANION GAP SERPL CALCULATED.3IONS-SCNC: 11 MMOL/L (ref 4–13)
ATRIAL RATE: 66 BPM
ATRIAL RATE: 68 BPM
ATRIAL RATE: 93 BPM
BASOPHILS # BLD AUTO: 0.03 THOUSANDS/ÂΜL (ref 0–0.1)
BASOPHILS NFR BLD AUTO: 0 % (ref 0–1)
BNP SERPL-MCNC: 18 PG/ML (ref 0–100)
BUN SERPL-MCNC: 18 MG/DL (ref 5–25)
CALCIUM SERPL-MCNC: 9.5 MG/DL (ref 8.4–10.2)
CARDIAC TROPONIN I PNL SERPL HS: 3 NG/L
CARDIAC TROPONIN I PNL SERPL HS: 3 NG/L
CHLORIDE SERPL-SCNC: 104 MMOL/L (ref 96–108)
CO2 SERPL-SCNC: 23 MMOL/L (ref 21–32)
CREAT SERPL-MCNC: 0.91 MG/DL (ref 0.6–1.3)
D DIMER PPP FEU-MCNC: <0.27 UG/ML FEU
EOSINOPHIL # BLD AUTO: 0.02 THOUSAND/ÂΜL (ref 0–0.61)
EOSINOPHIL NFR BLD AUTO: 0 % (ref 0–6)
ERYTHROCYTE [DISTWIDTH] IN BLOOD BY AUTOMATED COUNT: 13.9 % (ref 11.6–15.1)
FLUAV RNA RESP QL NAA+PROBE: NEGATIVE
FLUBV RNA RESP QL NAA+PROBE: NEGATIVE
GFR SERPL CREATININE-BSD FRML MDRD: 60 ML/MIN/1.73SQ M
GLUCOSE SERPL-MCNC: 244 MG/DL (ref 65–140)
HCT VFR BLD AUTO: 46.8 % (ref 34.8–46.1)
HGB BLD-MCNC: 15.3 G/DL (ref 11.5–15.4)
IMM GRANULOCYTES # BLD AUTO: 0.04 THOUSAND/UL (ref 0–0.2)
IMM GRANULOCYTES NFR BLD AUTO: 0 % (ref 0–2)
LYMPHOCYTES # BLD AUTO: 1.11 THOUSANDS/ÂΜL (ref 0.6–4.47)
LYMPHOCYTES NFR BLD AUTO: 12 % (ref 14–44)
MCH RBC QN AUTO: 31 PG (ref 26.8–34.3)
MCHC RBC AUTO-ENTMCNC: 32.7 G/DL (ref 31.4–37.4)
MCV RBC AUTO: 95 FL (ref 82–98)
MONOCYTES # BLD AUTO: 0.56 THOUSAND/ÂΜL (ref 0.17–1.22)
MONOCYTES NFR BLD AUTO: 6 % (ref 4–12)
NEUTROPHILS # BLD AUTO: 7.36 THOUSANDS/ÂΜL (ref 1.85–7.62)
NEUTS SEG NFR BLD AUTO: 82 % (ref 43–75)
NRBC BLD AUTO-RTO: 0 /100 WBCS
P AXIS: 57 DEGREES
P AXIS: 63 DEGREES
P AXIS: 68 DEGREES
PLATELET # BLD AUTO: 197 THOUSANDS/UL (ref 149–390)
PMV BLD AUTO: 9.8 FL (ref 8.9–12.7)
POTASSIUM SERPL-SCNC: 3.5 MMOL/L (ref 3.5–5.3)
PR INTERVAL: 210 MS
PR INTERVAL: 222 MS
PR INTERVAL: 222 MS
QRS AXIS: -66 DEGREES
QRS AXIS: -69 DEGREES
QRS AXIS: -71 DEGREES
QRSD INTERVAL: 80 MS
QRSD INTERVAL: 86 MS
QRSD INTERVAL: 90 MS
QT INTERVAL: 392 MS
QT INTERVAL: 432 MS
QT INTERVAL: 436 MS
QTC INTERVAL: 457 MS
QTC INTERVAL: 459 MS
QTC INTERVAL: 487 MS
RBC # BLD AUTO: 4.93 MILLION/UL (ref 3.81–5.12)
RSV RNA RESP QL NAA+PROBE: NEGATIVE
SARS-COV-2 RNA RESP QL NAA+PROBE: NEGATIVE
SODIUM SERPL-SCNC: 138 MMOL/L (ref 135–147)
T WAVE AXIS: 68 DEGREES
T WAVE AXIS: 73 DEGREES
T WAVE AXIS: 74 DEGREES
VENTRICULAR RATE: 66 BPM
VENTRICULAR RATE: 68 BPM
VENTRICULAR RATE: 93 BPM
WBC # BLD AUTO: 9.12 THOUSAND/UL (ref 4.31–10.16)

## 2024-07-25 PROCEDURE — 99215 OFFICE O/P EST HI 40 MIN: CPT | Performed by: NURSE PRACTITIONER

## 2024-07-25 PROCEDURE — G2211 COMPLEX E/M VISIT ADD ON: HCPCS | Performed by: NURSE PRACTITIONER

## 2024-07-25 PROCEDURE — 84484 ASSAY OF TROPONIN QUANT: CPT | Performed by: EMERGENCY MEDICINE

## 2024-07-25 PROCEDURE — 36415 COLL VENOUS BLD VENIPUNCTURE: CPT | Performed by: EMERGENCY MEDICINE

## 2024-07-25 PROCEDURE — 80048 BASIC METABOLIC PNL TOTAL CA: CPT | Performed by: EMERGENCY MEDICINE

## 2024-07-25 PROCEDURE — 94640 AIRWAY INHALATION TREATMENT: CPT

## 2024-07-25 PROCEDURE — 93010 ELECTROCARDIOGRAM REPORT: CPT | Performed by: INTERNAL MEDICINE

## 2024-07-25 PROCEDURE — 85379 FIBRIN DEGRADATION QUANT: CPT | Performed by: EMERGENCY MEDICINE

## 2024-07-25 PROCEDURE — 93005 ELECTROCARDIOGRAM TRACING: CPT

## 2024-07-25 PROCEDURE — 83880 ASSAY OF NATRIURETIC PEPTIDE: CPT | Performed by: EMERGENCY MEDICINE

## 2024-07-25 PROCEDURE — 99285 EMERGENCY DEPT VISIT HI MDM: CPT

## 2024-07-25 PROCEDURE — 71045 X-RAY EXAM CHEST 1 VIEW: CPT

## 2024-07-25 PROCEDURE — 71250 CT THORAX DX C-: CPT

## 2024-07-25 PROCEDURE — 99285 EMERGENCY DEPT VISIT HI MDM: CPT | Performed by: EMERGENCY MEDICINE

## 2024-07-25 PROCEDURE — 96374 THER/PROPH/DIAG INJ IV PUSH: CPT

## 2024-07-25 PROCEDURE — 0241U HB NFCT DS VIR RESP RNA 4 TRGT: CPT | Performed by: EMERGENCY MEDICINE

## 2024-07-25 PROCEDURE — 85025 COMPLETE CBC W/AUTO DIFF WBC: CPT | Performed by: EMERGENCY MEDICINE

## 2024-07-25 RX ORDER — IPRATROPIUM BROMIDE AND ALBUTEROL SULFATE 2.5; .5 MG/3ML; MG/3ML
3 SOLUTION RESPIRATORY (INHALATION) ONCE
Status: COMPLETED | OUTPATIENT
Start: 2024-07-25 | End: 2024-07-25

## 2024-07-25 RX ORDER — SODIUM CHLORIDE 9 MG/ML
3 INJECTION INTRAVENOUS
Status: DISCONTINUED | OUTPATIENT
Start: 2024-07-25 | End: 2024-07-25 | Stop reason: HOSPADM

## 2024-07-25 RX ORDER — METHYLPREDNISOLONE SODIUM SUCCINATE 125 MG/2ML
125 INJECTION, POWDER, LYOPHILIZED, FOR SOLUTION INTRAMUSCULAR; INTRAVENOUS ONCE
Status: COMPLETED | OUTPATIENT
Start: 2024-07-25 | End: 2024-07-25

## 2024-07-25 RX ADMIN — METHYLPREDNISOLONE SODIUM SUCCINATE 125 MG: 125 INJECTION, POWDER, FOR SOLUTION INTRAMUSCULAR; INTRAVENOUS at 12:35

## 2024-07-25 RX ADMIN — IPRATROPIUM BROMIDE AND ALBUTEROL SULFATE 3 ML: 2.5; .5 SOLUTION RESPIRATORY (INHALATION) at 11:38

## 2024-07-25 NOTE — ED NOTES
"Pt ambulated length of the hallway and back to her room.  Oxygen saturation stayed between 95-97%, however pt reported feeling \"shaky\".     Allie Shelton RN  07/25/24 6771    "

## 2024-07-25 NOTE — ED NOTES
"Pt finished breathing treatment and stated \"it helped\".  Pt marked ready for CT scan.     Allie Shelton RN  07/25/24 7635    "

## 2024-07-25 NOTE — PROGRESS NOTES
"Ambulatory Visit  Name: Elza Kaplan      : 1945      MRN: [id number]  Encounter Provider: JENI Jacques  Encounter Date: 2024   Encounter department: [facility] PRIMARY CARE    Assessment & Plan   1. SOB (shortness of breath)  Comments:  unimproved , to ER for eval       History of Present Illness     Elza is here for short interval follow-up to be seen by Dr. Pérez 2 days ago.  She was tachypneic , complaining of shortness of breath but pulse ox had been stable.  She was tachypneic he treated her with a steroid pack and with Doxy for clinical pneumonia.  He had wanted to admit her but she had been resistant.  She does note that she has intermittent tightness in her chest making it a little sore not at present she does have a cough chest x-ray was normal        Review of Systems    Objective     /70 (BP Location: Left arm, Patient Position: Sitting, Cuff Size: Standard)   Pulse 80   Temp 98.6 °F (37 °C) (Tympanic)   Ht 4' 11\" (1.499 m)   Wt 55.3 kg (122 lb)   SpO2 98%   BMI 24.64 kg/m²     Physical Exam  Cardiovascular:      Rate and Rhythm: Normal rate and regular rhythm.   Pulmonary:      Breath sounds: Rales (R lower lobe-to mid lobe) present.      Comments: Still tachypneic respirations 24, Pulse ox 98 @ rest, walking pulse ox stable though felt more winded  Musculoskeletal:      Right lower leg: No edema.      Left lower leg: No edema.   Skin:     General: Skin is warm.   Psychiatric:         Mood and Affect: Mood normal.       Administrative Statements           "

## 2024-07-25 NOTE — DISCHARGE INSTRUCTIONS
-As discussed, please follow up with pulmonology.  Please continue to take the steroids and antibiotics prescribed and return to care if you have any new or worsening symptoms  
How Severe Is Your Cyst?: mild
Is This A New Presentation, Or A Follow-Up?: Follow Up Cyst
Additional History: Patient is here for cyst excisions.

## 2024-07-25 NOTE — ED PROVIDER NOTES
History  Chief Complaint   Patient presents with    Shortness of Breath     Pt send from pcp due to SOB, reports recent URI infection     Patient is a 79-year-old female who presents for evaluation of dyspnea.  She was seen by her PCP 2 days ago and started on antibiotics and steroids for possible upper respiratory infection.  She says that she has had continued symptoms, they have not worsened but she does not feel much better.  She primarily complains of cough productive of whitish sputum and some exertional dyspnea.  There is a mild chest pressure also noted.  No fevers and she denies nausea or vomiting.  She denies urinary or bowel complaints.  No abdominal pain.  I discussed the case with the patient's PCP and reviewed previous notes.        Prior to Admission Medications   Prescriptions Last Dose Informant Patient Reported? Taking?   Omega 3 1000 MG CAPS   Yes No   Sig: Take 1,000 mg by mouth 2 (two) times a day   alendronate (FOSAMAX) 70 mg tablet   No No   Sig: Take 1 tablet by mouth once a week   buPROPion (WELLBUTRIN XL) 150 mg 24 hr tablet   No No   Sig: Take 1 tablet by mouth once daily   cholecalciferol (VITAMIN D3) 1,000 units tablet   Yes No   Sig: Take 1,000 Units by mouth daily   doxycycline hyclate (VIBRAMYCIN) 100 mg capsule   No No   Sig: Take 1 capsule (100 mg total) by mouth every 12 (twelve) hours for 7 days   ipratropium (ATROVENT) 0.06 % nasal spray   No No   Si sprays into each nostril 2 (two) times a day   irbesartan (AVAPRO) 150 mg tablet   No No   Sig: Take 1 tablet by mouth once daily   metoprolol succinate (TOPROL-XL) 25 mg 24 hr tablet   No No   Sig: Take 0.5 tablets (12.5 mg total) by mouth daily   multivitamin (THERAGRAN) TABS   Yes No   Sig: Take 1 tablet by mouth daily   predniSONE 20 mg tablet   No No   Sig: Take 2 tablets (40 mg total) by mouth daily for 5 days   simvastatin (ZOCOR) 40 mg tablet   No No   Sig: Take 1 tablet by mouth once daily   traZODone (DESYREL) 50 mg  tablet   No No   Sig: TAKE 1 TABLET BY MOUTH ONCE DAILY AT BEDTIME AS NEEDED FOR SLEEP      Facility-Administered Medications: None       Past Medical History:   Diagnosis Date    Allergic     Anxiety the passed 2 yrs    Arthritis     Colon polyp     Depression     Diabetes (HCC)     Diabetes mellitus (HCC)     Hernia 2006    Rightside    HL (hearing loss)     Hx of benign breast biopsy 10/31/2018    Left breast mammogram and ultrasound 10/10/2018.    Hyperlipidemia     Hypertension     Obesity     Osteoporosis the passed 8 yrs    Visual impairment        Past Surgical History:   Procedure Laterality Date    BREAST BIOPSY Left 1990    benign per patient report    BREAST BIOPSY Left 11/13/2018    benign   NO CLIP    BREAST SURGERY Left 1990    biopsy on left breast    CHOLECYSTECTOMY      EYE SURGERY  right 6/13/2018 and left 5/16/2018    HERNIA REPAIR      HYSTERECTOMY  04/15/2016    US GUIDED BREAST BIOPSY LEFT COMPLETE Left 11/13/2018       Family History   Problem Relation Age of Onset    Heart failure Mother     Arthritis Mother     Hearing loss Mother     Heart disease Mother     Hypertension Mother     Hypothyroidism Mother     Diabetes Father     Alcohol abuse Father     Dementia Father     Stomach cancer Maternal Grandmother     No Known Problems Maternal Grandfather     No Known Problems Paternal Grandmother     No Known Problems Paternal Grandfather     No Known Problems Maternal Aunt     No Known Problems Maternal Aunt     No Known Problems Paternal Aunt     No Known Problems Paternal Aunt     No Known Problems Paternal Aunt     No Known Problems Paternal Aunt      I have reviewed and agree with the history as documented.    E-Cigarette/Vaping    E-Cigarette Use Never User      E-Cigarette/Vaping Substances    Nicotine No     THC No     CBD No     Flavoring No     Other No     Unknown No      Social History     Tobacco Use    Smoking status: Never    Smokeless tobacco: Never   Vaping Use    Vaping status:  Never Used   Substance Use Topics    Alcohol use: No    Drug use: No       Review of Systems   Constitutional:  Negative for fever.   HENT:  Negative for sore throat.    Respiratory:  Positive for cough and shortness of breath.    Cardiovascular:  Positive for chest pain.   Gastrointestinal:  Negative for abdominal pain.   Genitourinary:  Negative for dysuria.   Musculoskeletal:  Negative for back pain.   Skin:  Negative for rash.   Neurological:  Negative for light-headedness.   Psychiatric/Behavioral:  Negative for agitation.    All other systems reviewed and are negative.      Physical Exam  Physical Exam  Vitals reviewed.   Constitutional:       General: She is not in acute distress.     Appearance: She is well-developed.   HENT:      Head: Normocephalic.   Eyes:      Pupils: Pupils are equal, round, and reactive to light.   Cardiovascular:      Rate and Rhythm: Normal rate and regular rhythm.      Heart sounds: Normal heart sounds.   Pulmonary:      Effort: Pulmonary effort is normal.      Comments: Faint rhonchorous breath sounds at the bases, mild tachypnea noted  Abdominal:      General: Bowel sounds are normal. There is no distension.      Palpations: Abdomen is soft.      Tenderness: There is no abdominal tenderness. There is no guarding.   Musculoskeletal:         General: No tenderness or deformity. Normal range of motion.      Cervical back: Normal range of motion and neck supple.   Skin:     General: Skin is warm and dry.      Capillary Refill: Capillary refill takes less than 2 seconds.   Neurological:      Mental Status: She is alert and oriented to person, place, and time.      Cranial Nerves: No cranial nerve deficit.      Sensory: No sensory deficit.   Psychiatric:         Behavior: Behavior normal.         Thought Content: Thought content normal.         Judgment: Judgment normal.         Vital Signs  ED Triage Vitals   Temperature Pulse Respirations Blood Pressure SpO2   07/25/24 1026 07/25/24  1026 07/25/24 1026 07/25/24 1026 07/25/24 1026   98 °F (36.7 °C) 80 (!) 26 139/64 99 %      Temp src Heart Rate Source Patient Position - Orthostatic VS BP Location FiO2 (%)   -- 07/25/24 1026 07/25/24 1030 07/25/24 1026 --    Monitor Sitting Right arm       Pain Score       07/25/24 1026       No Pain           Vitals:    07/25/24 1026 07/25/24 1030 07/25/24 1100   BP: 139/64 128/58 106/53   Pulse: 80 71 75   Patient Position - Orthostatic VS:  Sitting Sitting         Visual Acuity      ED Medications  Medications   sodium chloride (PF) 0.9 % injection 3 mL (has no administration in time range)   ipratropium-albuterol (DUO-NEB) 0.5-2.5 mg/3 mL inhalation solution 3 mL (3 mL Nebulization Given 7/25/24 1138)   methylPREDNISolone sodium succinate (Solu-MEDROL) injection 125 mg (125 mg Intravenous Given 7/25/24 1235)       Diagnostic Studies  Results Reviewed       Procedure Component Value Units Date/Time    HS Troponin I 2hr [[id number]]  (Normal) Collected: 07/25/24 1232    Lab Status: Final result Specimen: Blood from Arm, Left Updated: 07/25/24 1301     hs TnI 2hr 3 ng/L      Delta 2hr hsTnI 0 ng/L     FLU/RSV/COVID - if FLU/RSV clinically relevant [[id number]]  (Normal) Collected: 07/25/24 1132    Lab Status: Final result Specimen: Nares from Nose Updated: 07/25/24 1215     SARS-CoV-2 Negative     INFLUENZA A PCR Negative     INFLUENZA B PCR Negative     RSV PCR Negative    Narrative:      FOR PEDIATRIC PATIENTS - copy/paste COVID Guidelines URL to browser: [email or web address]    SARS-CoV-2 assay is a Nucleic Acid Amplification assay intended for the  qualitative detection of nucleic acid from SARS-CoV-2 in nasopharyngeal  swabs. Results are for the presumptive identification of SARS-CoV-2 RNA.    Positive results are indicative of infection with SARS-CoV-2, the virus  causing COVID-19, but do not rule out bacterial infection or co-infection  with other  viruses. Laboratories within the [place] and its  territories are required to report all positive results to the appropriate  public health authorities. Negative results do not preclude SARS-CoV-2  infection and should not be used as the sole basis for treatment or other  patient management decisions. Negative results must be combined with  clinical observations, patient history, and epidemiological information.  This test has not been FDA cleared or approved.    This test has been authorized by FDA under an Emergency Use Authorization  (EUA). This test is only authorized for the duration of time the  declaration that circumstances exist justifying the authorization of the  emergency use of an in vitro diagnostic tests for detection of SARS-CoV-2  virus and/or diagnosis of COVID-19 infection under section 564(b)(1) of  the Act, 21 U.S.C. 360bbb-3(b)(1), unless the authorization is terminated  or revoked sooner. The test has been validated but independent review by FDA  and CLIA is pending.    Test performed using [employer] GeneXpert: This RT-PCR assay targets N2,  a region unique to SARS-CoV-2. A conserved region in the E-gene was chosen  for pan-Sarbecovirus detection which includes SARS-CoV-2.    According to CMS-2020-01-R, this platform meets the definition of high-throughput technology.    HS Troponin I 4hr [[id number]]     Lab Status: No result Specimen: Blood     D-dimer, quantitative [[id number]]  (Normal) Collected: 07/25/24 1037    Lab Status: Final result Specimen: Blood from Arm, Left Updated: 07/25/24 1133     D-Dimer, Quant <0.27 ug/ml FEU     Narrative:      In the evaluation for possible pulmonary embolism, in the appropriate (Well's Score of 4 or less) patient, the age adjusted d-dimer cutoff for this patient can be calculated as:    Age x 0.01 (in ug/mL) for Age-adjusted D-dimer exclusion threshold for a patient over 50 years.    HS Troponin 0hr (reflex protocol) [[id number]]  (Normal) Collected:  07/25/24 1037    Lab Status: Final result Specimen: Blood from Arm, Left Updated: 07/25/24 1117     hs TnI 0hr 3 ng/L     B-Type Natriuretic Peptide(BNP) [[id number]]  (Normal) Collected: 07/25/24 1037    Lab Status: Final result Specimen: Blood from Arm, Left Updated: 07/25/24 1116     BNP 18 pg/mL     Basic metabolic panel [[id number]]  (Abnormal) Collected: 07/25/24 1037    Lab Status: Final result Specimen: Blood from Arm, Left Updated: 07/25/24 1110     Sodium 138 mmol/L      Potassium 3.5 mmol/L      Chloride 104 mmol/L      CO2 23 mmol/L      ANION GAP 11 mmol/L      BUN 18 mg/dL      Creatinine 0.91 mg/dL      Glucose 244 mg/dL      Calcium 9.5 mg/dL      eGFR 60 ml/min/1.73sq m     Narrative:      National Kidney Disease Foundation guidelines for Chronic Kidney Disease (CKD):     Stage 1 with normal or high GFR (GFR > 90 mL/min/1.73 square meters)    Stage 2 Mild CKD (GFR = 60-89 mL/min/1.73 square meters)    Stage 3A Moderate CKD (GFR = 45-59 mL/min/1.73 square meters)    Stage 3B Moderate CKD (GFR = 30-44 mL/min/1.73 square meters)    Stage 4 Severe CKD (GFR = 15-29 mL/min/1.73 square meters)    Stage 5 End Stage CKD (GFR <15 mL/min/1.73 square meters)  Note: GFR calculation is accurate only with a steady state creatinine    CBC and differential [[id number]]  (Abnormal) Collected: 07/25/24 1037    Lab Status: Final result Specimen: Blood from Arm, Left Updated: 07/25/24 1052     WBC 9.12 Thousand/uL      RBC 4.93 Million/uL      Hemoglobin 15.3 g/dL      Hematocrit 46.8 %      MCV 95 fL      MCH 31.0 pg      MCHC 32.7 g/dL      RDW 13.9 %      MPV 9.8 fL      Platelets 197 Thousands/uL      nRBC 0 /100 WBCs      Segmented % 82 %      Immature Grans % 0 %      Lymphocytes % 12 %      Monocytes % 6 %      Eosinophils Relative 0 %      Basophils Relative 0 %      Absolute Neutrophils 7.36 Thousands/µL      Absolute Immature Grans 0.04 Thousand/uL      Absolute Lymphocytes 1.11 Thousands/µL      Absolute  Monocytes 0.56 Thousand/µL      Eosinophils Absolute 0.02 Thousand/µL      Basophils Absolute 0.03 Thousands/µL                    CT chest without contrast   Final Result by Ailyn Jenkins MD (07/25 1259)      No pneumonia.      Mild bilateral lower lobe juxtapleural reticulation and mild cylindrical bronchiectasis/bronchiolectasis. According to the American Thoracic Society guidelines, this is compatible with a mild probable UIP pattern of pulmonary fibrosis. Consider    outpatient pulmonary referral for further evaluation and management of early interstitial lung disease.      The study was marked in EPIC for immediate notification.         Workstation performed: [id number]         X-ray chest 1 view portable   ED Interpretation by Darell Aragon MD (07/25 1129)   Minimal right lower lobe consolidation seen as compared to x-ray 2 days ago      Final Result by Iglesia Cochran MD (07/25 1343)      No acute cardiopulmonary disease.            Workstation performed: [id number]                    Procedures  ECG 12 Lead Documentation Only    Date/Time: 7/25/2024 2:05 PM    Performed by: Darell Aragon MD  Authorized by: Darell Aragon MD    ECG reviewed by me, the ED Provider: yes    Patient location:  ED  Previous ECG:     Previous ECG:  Unavailable    Comparison to cardiac monitor: Yes    Interpretation:     Interpretation: abnormal    Rate:     ECG rate assessment: normal    Rhythm:     Rhythm: sinus rhythm    Ectopy:     Ectopy: none    QRS:     QRS axis:  Left    QRS intervals:  Normal  Conduction:     Conduction: abnormal      Abnormal conduction: LAFB    ST segments:     ST segments:  Normal  T waves:     T waves: normal             ED Course                                 SBIRT 22yo+      Flowsheet Row Most Recent Value   Initial Alcohol Screen: US AUDIT-C     1. How often do you have a drink containing alcohol? 0 Filed at: 07/25/2024 1026   2. How many drinks containing alcohol  do you have on a typical day you are drinking?  0 Filed at: 07/25/2024 1026   3a. Male UNDER 65: How often do you have five or more drinks on one occasion? 0 Filed at: 07/25/2024 1026   3b. FEMALE Any Age, or MALE 65+: How often do you have 4 or more drinks on one occassion? 0 Filed at: 07/25/2024 1026   Audit-C Score 0 Filed at: 07/25/2024 1026   YISEL: How many times in the past year have you...    Used an illegal drug or used a prescription medication for non-medical reasons? Never Filed at: 07/25/2024 1026            Wells' Criteria for PE      Flowsheet Row Most Recent Value   Wells' Criteria for PE    Clinical signs and symptoms of DVT 0 Filed at: 07/25/2024 1134   PE is primary diagnosis or equally likely 0 Filed at: 07/25/2024 1134   HR >100 0 Filed at: 07/25/2024 1134   Immobilization at least 3 days or Surgery in the previous 4 weeks 0 Filed at: 07/25/2024 1134   Previous, objectively diagnosed PE or DVT 0 Filed at: 07/25/2024 1134   Hemoptysis 0 Filed at: 07/25/2024 1134   Malignancy with treatment within 6 months or palliative 0 Filed at: 07/25/2024 1134   Wells' Criteria Total 0 Filed at: 07/25/2024 1134                  Medical Decision Making  Patient is a 79-year-old female who presents for evaluation of chest pressure and dyspnea.  Primary concern for pneumonia, PE, ACS.  Workup for ACS is negative including EKG which is nonischemic and troponin x 2.  Patient initially had chest x-ray that I questionable right lower lobe consolidation.  Patient's CT scan was reviewed and shows likely early pulmonary fibrosis.  For this reason, I discussed with pulmonology who advised continuing with antibiotics and steroids and outpatient follow-up.  Patient was mildly tachypneic on my exam but was able to ambulate in the hallway and did not drop her saturation.  Advise close PCP follow-up and strict return precautions.    Amount and/or Complexity of Data Reviewed  Labs: ordered.  Radiology: ordered and independent  interpretation performed.    Risk  Prescription drug management.                 Disposition  Final diagnoses:   UIP (usual interstitial pneumonitis) (HCC)     Time reflects when diagnosis was documented in both MDM as applicable and the Disposition within this note       Time User Action Codes Description Comment    7/25/2024  1:26 PM Darell Aragon Add [J84.112] UIP (usual interstitial pneumonitis) (HCC)           ED Disposition       ED Disposition   Discharge    Condition   Stable    Date/Time   Thu Jul 25, 2024 1326    Comment   Elza Kaplan discharge to home/self care.                   Follow-up Information       Follow up With Specialties Details Why Contact Info Additional Information    [facility] Emergency Department Emergency Medicine  If symptoms worsen [address] [facility] [address] [phone] [facility] Emergency Department, [address] [place], [place] [address]    Trey Pérez MD Family Medicine, Emergency Medicine Schedule an appointment as soon as possible for a visit   [facility] [place]  [place] [address] [phone]       Massiel Vega MD Pulmonary Disease, Pulmonology, Critical Care Medicine Schedule an appointment as soon as possible for a visit   [address] [place] [address]  [place] [id number] [phone]               Discharge Medication List as of 7/25/2024  1:27 PM        CONTINUE these medications which have NOT CHANGED    Details   alendronate (FOSAMAX) 70 mg tablet Take 1 tablet by mouth once a week, Starting Mon 7/1/2024, Normal      buPROPion (WELLBUTRIN XL) 150 mg 24 hr tablet Take 1 tablet by mouth once daily, Normal      cholecalciferol (VITAMIN D3) 1,000 units tablet Take 1,000 Units by mouth daily, Historical Med      doxycycline hyclate (VIBRAMYCIN) 100 mg capsule Take 1 capsule (100 mg total) by mouth every 12 (twelve) hours for 7 days, Starting Tue 7/23/2024, Until Tue 7/30/2024,  Normal      ipratropium (ATROVENT) 0.06 % nasal spray 2 sprays into each nostril 2 (two) times a day, Starting Tue 10/31/2023, Normal      irbesartan (AVAPRO) 150 mg tablet Take 1 tablet by mouth once daily, Starting Tue 7/9/2024, Normal      metoprolol succinate (TOPROL-XL) 25 mg 24 hr tablet Take 0.5 tablets (12.5 mg total) by mouth daily, Starting Tue 5/7/2024, Normal      multivitamin (THERAGRAN) TABS Take 1 tablet by mouth daily, Historical Med      Omega 3 1000 MG CAPS Take 1,000 mg by mouth 2 (two) times a day, Historical Med      predniSONE 20 mg tablet Take 2 tablets (40 mg total) by mouth daily for 5 days, Starting Tue 7/23/2024, Until Sun 7/28/2024, Normal      simvastatin (ZOCOR) 40 mg tablet Take 1 tablet by mouth once daily, Normal      traZODone (DESYREL) 50 mg tablet TAKE 1 TABLET BY MOUTH ONCE DAILY AT BEDTIME AS NEEDED FOR SLEEP, Starting Sun 6/2/2024, Normal                 PDMP Review       None            ED Provider  Electronically Signed by             Darell Aragon MD  07/25/24 0873

## 2024-07-26 ENCOUNTER — TELEPHONE (OUTPATIENT)
Age: 79
End: 2024-07-26

## 2024-07-26 ENCOUNTER — VBI (OUTPATIENT)
Dept: FAMILY MEDICINE CLINIC | Facility: CLINIC | Age: 79
End: 2024-07-26

## 2024-07-26 ENCOUNTER — NURSE TRIAGE (OUTPATIENT)
Age: 79
End: 2024-07-26

## 2024-07-26 DIAGNOSIS — J84.9 INTERSTITIAL LUNG DISEASE (HCC): Primary | ICD-10-CM

## 2024-07-26 DIAGNOSIS — M81.0 OSTEOPOROSIS, UNSPECIFIED OSTEOPOROSIS TYPE, UNSPECIFIED PATHOLOGICAL FRACTURE PRESENCE: ICD-10-CM

## 2024-07-26 RX ORDER — ALENDRONATE SODIUM 70 MG/1
70 TABLET ORAL WEEKLY
Qty: 4 TABLET | Refills: 0 | Status: SHIPPED | OUTPATIENT
Start: 2024-07-26

## 2024-07-26 NOTE — TELEPHONE ENCOUNTER
Returned pt's call.    Advised no sooner appt's for PULM office she wants to go to. Reminded of appt date on 8/19 [facility].    Advised to call PCP or go to ER for evaluation since pt not established with us. Pt stated she called her PCP and PCP ordering more testing.    Pt acknowledged instructions and had no further questions at this time.

## 2024-07-26 NOTE — TELEPHONE ENCOUNTER
Patient called, she just has a question for her PCP. Yesterday she was sent to the ER and at discharge was told to f/u with Pulmonary so soon as possible. She is concerned because the soonest they could get her in was Aug 19th. She wanted to know if that was ok to wait that long.   She would like a call back to discuss.     Please advise, thank you.

## 2024-07-26 NOTE — TELEPHONE ENCOUNTER
Called patient and made aware. Advised I would call and schedule PFT's ASAP any day except Aug 12th.

## 2024-07-26 NOTE — TELEPHONE ENCOUNTER
Called Pulmonary and patient will be placed on a cancellation list to be seen sooner than Aug 19th. Also called and scheduled PFT's for Aug 8th @ 8AM

## 2024-07-26 NOTE — TELEPHONE ENCOUNTER
Regarding: SOB/dry cough  ----- Message from Hanna SANDS sent at 7/26/2024 10:20 AM EDT -----  Patient calling reporting shortness of breath and a dry cough.  She was seen in ER yesterday.  She has a consult scheduled with Dr. Vega in August, no appointment sooner in [facility].

## 2024-07-26 NOTE — TELEPHONE ENCOUNTER
07/26/24 9:31 AM    Patient contacted post ED visit, [facility] department spoke with patient/caregiver and outreach was successful.    Thank you.  Noa Vides  [facility]

## 2024-07-26 NOTE — TELEPHONE ENCOUNTER
I reviewed patient's ER visit and ironically I was also in the ER when she was there and physically examined her.  I know she is waiting to get into see pulmonology however if we want to proceed with ordering a PFT study to start with so she can have that done when she arrives of pulmonology as this is likely the for step of they will do I think that is reasonable.  I will place an order for PFT study we can assist her in scheduling that would be wonderful.  Thank you.

## 2024-08-05 ENCOUNTER — CONSULT (OUTPATIENT)
Dept: PULMONOLOGY | Facility: CLINIC | Age: 79
End: 2024-08-05
Payer: COMMERCIAL

## 2024-08-05 VITALS
WEIGHT: 121 LBS | BODY MASS INDEX: 24.39 KG/M2 | HEIGHT: 59 IN | DIASTOLIC BLOOD PRESSURE: 76 MMHG | OXYGEN SATURATION: 96 % | TEMPERATURE: 97.7 F | SYSTOLIC BLOOD PRESSURE: 122 MMHG | HEART RATE: 95 BPM

## 2024-08-05 DIAGNOSIS — J84.9 ILD (INTERSTITIAL LUNG DISEASE) (HCC): Primary | ICD-10-CM

## 2024-08-05 DIAGNOSIS — R09.82 POST-NASAL DRIP: ICD-10-CM

## 2024-08-05 DIAGNOSIS — J47.9 BRONCHIECTASIS WITHOUT COMPLICATION (HCC): ICD-10-CM

## 2024-08-05 DIAGNOSIS — R91.8 PULMONARY NODULES: ICD-10-CM

## 2024-08-05 DIAGNOSIS — R06.09 DYSPNEA ON EXERTION: ICD-10-CM

## 2024-08-05 PROCEDURE — 94618 PULMONARY STRESS TESTING: CPT

## 2024-08-05 PROCEDURE — 99204 OFFICE O/P NEW MOD 45 MIN: CPT

## 2024-08-05 RX ORDER — ALBUTEROL SULFATE 90 UG/1
2 AEROSOL, METERED RESPIRATORY (INHALATION) EVERY 4 HOURS PRN
Qty: 18 G | Refills: 3 | Status: SHIPPED | OUTPATIENT
Start: 2024-08-05

## 2024-08-05 RX ORDER — AZELASTINE 1 MG/ML
1 SPRAY, METERED NASAL 2 TIMES DAILY
Qty: 1 ML | Refills: 3 | Status: SHIPPED | OUTPATIENT
Start: 2024-08-05

## 2024-08-05 NOTE — PATIENT INSTRUCTIONS
- Trial of azelastine nasal spray use 1 spray per nostril twice daily  - Start using saline nasal rinses daily  - Lung function testing  - Please have your lab work completed  - Oxygen walk in the office today

## 2024-08-05 NOTE — PROGRESS NOTES
Consultation - Pulmonary Medicine   Elza Kaplan 79 y.o. female MRN: [id number]    Physician Requesting Consult: Darell Aragon MD  Reason for Consult: ILD  Elza Kaplan is a 79 y.o. female with PMHx of ILD, HTN, HLD, prediabetes and CKD 3a who presents for abnormal CT.    ILD (Interstitial Lung Disease)  Bronchiectasis  Dyspnea on Exertion  - The patient was recently found to have mild subpleural reticulations with mild bronchiectasis, but no honeycombing in a probable UIP pattern.  Progression is unclear as there is no prior CT imaging of the chest.  She notes fairly significant dyspnea on exertion and even at rest during our discussion at time she appears dyspneic.  Her degree of dyspnea appears somewhat discordant with the imaging findings, may need to consider other potential etiologies for her dyspnea if planned workup is unremarkable.  - PFTs are already scheduled for later this week, will follow-up with results.  - Check serologic workup for cause of ILD, at this time the patient does not offer any symptoms to point to a specific etiology.  IPF is certainly possible given basilar predominance and probable UIP patterning.  - Can trial albuterol HFA as needed for now.  - Ambulatory pulse ox in the office today did not demonstrate exertional hypoxia or need for oxygen.  - Discussed the importance of daily activity maintenance of exercise tolerance.  - Consider PCV 20 at next office visit, recommend updated flu vaccine in the fall.  - Follow-up in 3 months.  -     Ambulatory Referral to Pulmonology  -     RF Screen w/ Reflex to Titer; Future  -     Cyclic citrul peptide antibody, IgG; Future  -     Aldolase; Future  -     SOFIE Screen w/ Reflex to Titer/Pattern; Future  -     C-reactive protein; Future  -     Sjogren's Antibodies; Future  -     CK; Future  -     Anti-scleroderma antibody; Future  -     Anti-neutrophilic cytoplasmic antibody; Future  -     POCT Oxygen Titration  -     albuterol  (Ventolin HFA) 90 mcg/act inhaler; Inhale 2 puffs every 4 (four) hours as needed for wheezing or shortness of breath  -     Immunoglobulins A/E/G/M, Serum; Future    Pulmonary Nodules  - CT chest with 3 mm GILL and 5 mm RLL nodules.  The patient is a never smoker and is likely low risk.  Based on progression of the above could consider repeat CT at 1 year, but will hold off on ordering repeat imaging studies at this time.    Post-Nasal Drip  - She notes continued postnasal drip despite flonase + ipratropium and has allergic symptoms, will transition to azelastine nasal spray to see if this gives her any benefit.  -     azelastine (ASTELIN) 0.1 % nasal spray; 1 spray into each nostril 2 (two) times a day Use in each nostril as directed    Thank you for allowing me to participate in the care of your patient.  If there are any questions regarding evaluation please feel free to reach out.     Return in about 4 months (around 12/5/2024).  ______________________________________________________________________    HPI:    Elza Kaplan is a 79 y.o. female with PMHx of ILD, HTN, HLD, prediabetes and CKD 3a who presents for abnormal CT. the patient was referred by the ED where she presented due to dyspnea on 7/25/2024.  She had previously been seen by her PCP for similar symptoms and concern was for URI for which she was prescribed 1 week of doxycycline and prednisone 40 mg daily x 5 days with some mild benefit.  Initial CXR was unremarkable, but at the ED a CT chest WO did show mild subpleural reticulations and mild bronchiectasis with 3 mm GILL and 5 mm RLL nodules.  The patient states she was in her usual state of health up until mid July.  Prior to that she notes no significant respiratory illnesses or pulmonary diagnoses.  She does note recurrent sinus infections, and have been following with ENT with continued rhinitis/postnasal drip on Flonase and ipratropium nasal spray.  She has never been on inhaler/nebulizer  therapy before.  She then began in mid July with sensation of head fullness, dry cough, burning sensation in her eyes and rhinitis/post-nasal drip, but denies sick contacts, but reports   She notes that some of her symptoms have subsided to a degree, but she still feels dyspneic and continues to have significant post-nasal drip.  She has dyspnea both at rest and with activity.  Cough has almost completely resolved, she denies hemoptysis and reports it was dry throughout.  Currently estimates she could walk 1 block on flat ground and 1 flight of steps before needing to rest.  She notes this is worse than before recent illness.  She does have PFTs scheduled for later this week.  Otherwise she denies chest pain, palpitations, pedal edema, syncope, unintentional weight loss, night sweats, myalgias, arthralgias, rashes and hemoptysis.    She does report a history of recurrent sinus infections and has been following with ENT.  She notes continuous post-nasal drip that has not responded to flonase or ipratropium nasal spray.  She denies frequent respiratory illnesses and denies prior pulmonary diagnosis.    She has seasonal allergies and takes PRN cetirizine which she has only been using intermittently recently.  She is currently also using ipratropium nasal spray, but she doesn't feel this is helping.  She reports no new medication changes recently.    Tobacco/Vaping: never smoker, but had significant 2nd hand exposure from father/uncles/ who would all smoke in the house around her.  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    Current Medications:    Current Outpatient Medications:     albuterol (Ventolin HFA) 90 mcg/act inhaler, Inhale 2 puffs every 4 (four) hours as needed for wheezing or shortness of breath, Disp: 18 g, Rfl: 3    azelastine (ASTELIN) 0.1 % nasal spray, 1 spray into each nostril 2 (two) times a day Use in each nostril as directed, Disp: 1 mL, Rfl: 3    alendronate (FOSAMAX) 70 mg tablet, Take 1 tablet by mouth once a week, Disp: 4 tablet, Rfl: 0    buPROPion (WELLBUTRIN XL) 150 mg 24 hr tablet, Take 1 tablet by mouth once daily, Disp: 90 tablet, Rfl: 1    cholecalciferol (VITAMIN D3) 1,000 units tablet, Take 1,000 Units by mouth daily, Disp: , Rfl:     ipratropium (ATROVENT) 0.06 % nasal spray, 2 sprays into each nostril 2 (two) times a day, Disp: 15 mL, Rfl: 11    irbesartan (AVAPRO) 150 mg tablet, Take 1 tablet by mouth once daily, Disp: 100 tablet, Rfl: 1    metoprolol succinate (TOPROL-XL) 25 mg 24 hr tablet, Take 0.5 tablets (12.5 mg total) by mouth daily, Disp: 45 tablet, Rfl: 1    multivitamin (THERAGRAN) TABS, Take 1 tablet by mouth daily, Disp: , Rfl:     Omega 3 1000 MG CAPS, Take 1,000 mg by mouth 2 (two) times a day, Disp: , Rfl:     simvastatin (ZOCOR) 40 mg tablet, Take 1 tablet by mouth once daily, Disp: 90 tablet, Rfl: 1    traZODone (DESYREL) 50 mg tablet, TAKE 1 TABLET BY MOUTH ONCE DAILY AT BEDTIME AS NEEDED FOR SLEEP, Disp: 30 tablet, Rfl: 5  No current facility-administered medications for this visit.    Historical Information   Past Medical History:   Diagnosis Date    Allergic     Allergic rhinitis     Anxiety the passed 2 yrs    Arthritis     Cataract About 5 years ago    Colon polyp     Depression     Diabetes (HCC)     Diabetes mellitus (HCC)     Hernia 2006    Rightside    HL (hearing loss)     Hx of benign breast biopsy 10/31/2018    Left breast mammogram and ultrasound 10/10/2018.    Hyperlipidemia      "Hypertension     Obesity     Osteoporosis the passed 8 yrs    Visual impairment      Past Surgical History:   Procedure Laterality Date    BREAST BIOPSY Left 1990    benign per patient report    BREAST BIOPSY Left 11/13/2018    benign   NO CLIP    BREAST SURGERY Left 1990    biopsy on left breast    CHOLECYSTECTOMY      EYE SURGERY  right 6/13/2018 and left 5/16/2018    HERNIA REPAIR      HYSTERECTOMY  04/15/2016    US GUIDED BREAST BIOPSY LEFT COMPLETE Left 11/13/2018   Social History   Social History     Tobacco Use   Smoking Status Never   Smokeless Tobacco Never     Family History:   Family History   Problem Relation Age of Onset    Heart failure Mother     Arthritis Mother     Hearing loss Mother     Heart disease Mother     Hypertension Mother     Hypothyroidism Mother     Diabetes Father     Alcohol abuse Father     Dementia Father     Stomach cancer Maternal Grandmother     No Known Problems Maternal Grandfather     No Known Problems Paternal Grandmother     No Known Problems Paternal Grandfather     No Known Problems Maternal Aunt     No Known Problems Maternal Aunt     No Known Problems Paternal Aunt     No Known Problems Paternal Aunt     No Known Problems Paternal Aunt     No Known Problems Paternal Aunt      PhysicalExamination:  Vitals:   /76 (BP Location: Left arm, Patient Position: Sitting, Cuff Size: Standard)   Pulse 95   Temp 97.7 °F (36.5 °C) (Temporal)   Ht 4' 11\" (1.499 m)   Wt 54.9 kg (121 lb)   SpO2 96%   BMI 24.44 kg/m²   Body mass index is 24.44 kg/m².    Constitutional: intermittent conversational dyspnea, on room air   Skin: Warm, dry, no rashes noted   Eyes: PERRL, normal conjunctiva  ENT: Nasal congestion absent, moist mucus membranes.  Neck: No JVD, trachea is midline, no adenopathy.  Resp: faint dry crackles at B/L bases, otherwise CTA B/L, no wheezing   Cardiac: RRR, +S1/S2, no M/R/G  Abdomen: Soft, NT/ND  Extremities: No digital clubbing or pedal edema  Neuro: " "AAOx3    Diagnostic Data:  Labs:  I personally reviewed the most recent laboratory data pertinent to today's visit    Lab Results   Component Value Date    WBC 9.12 07/25/2024    HGB 15.3 07/25/2024    HCT 46.8 (H) 07/25/2024    MCV 95 07/25/2024     07/25/2024     Lab Results   Component Value Date    CALCIUM 9.5 07/25/2024    K 3.5 07/25/2024    CO2 23 07/25/2024     07/25/2024    BUN 18 07/25/2024    CREATININE 0.91 07/25/2024     Lab Results   Component Value Date    IGE 12.3 08/06/2024     Lab Results   Component Value Date    ALT 25 12/14/2023    AST 22 12/14/2023    ALKPHOS 63 12/14/2023     PFT results:  None    Imaging:  I personally reviewed the images in PACS pertinent to today's visit:  CT Chest WO -- 7/25/2024  No pneumonia.  Mild bilateral lower lobe juxtapleural reticulation and mild cylindrical bronchiectasis/bronchiolectasis. According to the American Thor[employer] Society guidelines, this is compatible with a mild probable UIP pattern of pulmonary fibrosis. Consider outpatient pulmonary referral for further evaluation and management of early interstitial lung disease.    Other studies:  None    I have spent a total time of 45 minutes on 08/09/24 in caring for this patient including Instructions for management, Patient and family education, Importance of tx compliance, Counseling / Coordination of care, Documenting in the medical record, Reviewing / ordering tests, medicine, procedures  , and Obtaining or reviewing history  .    Jenae Soriano MD  Pulmonary-Critical Care and Sleep Medicine  08/09/24    Portions of the record may have been created with voice recognition software. Occasional wrong word or \"sound a like\" substitutions may have occurred due to the inherent limitations of voice recognition software. Please read the chart carefully and recognize, using context, where substitutions have occurred.  "

## 2024-08-06 ENCOUNTER — APPOINTMENT (OUTPATIENT)
Age: 79
End: 2024-08-06
Payer: COMMERCIAL

## 2024-08-06 DIAGNOSIS — J84.9 ILD (INTERSTITIAL LUNG DISEASE) (HCC): ICD-10-CM

## 2024-08-06 DIAGNOSIS — J47.9 BRONCHIECTASIS WITHOUT COMPLICATION (HCC): ICD-10-CM

## 2024-08-06 LAB
ANA SER QL IA: NEGATIVE
CK SERPL-CCNC: 38 U/L (ref 26–192)
CRP SERPL QL: 1.9 MG/L
IGA SERPL-MCNC: 273 MG/DL (ref 66–433)
IGG SERPL-MCNC: 818 MG/DL (ref 635–1741)
IGM SERPL-MCNC: 44 MG/DL (ref 45–281)

## 2024-08-06 PROCEDURE — 86430 RHEUMATOID FACTOR TEST QUAL: CPT

## 2024-08-06 PROCEDURE — 82784 ASSAY IGA/IGD/IGG/IGM EACH: CPT

## 2024-08-06 PROCEDURE — 86038 ANTINUCLEAR ANTIBODIES: CPT

## 2024-08-06 PROCEDURE — 36415 COLL VENOUS BLD VENIPUNCTURE: CPT

## 2024-08-06 PROCEDURE — 82550 ASSAY OF CK (CPK): CPT

## 2024-08-06 PROCEDURE — 86200 CCP ANTIBODY: CPT

## 2024-08-06 PROCEDURE — 83520 IMMUNOASSAY QUANT NOS NONAB: CPT

## 2024-08-06 PROCEDURE — 82785 ASSAY OF IGE: CPT

## 2024-08-06 PROCEDURE — 82085 ASSAY OF ALDOLASE: CPT

## 2024-08-06 PROCEDURE — 86235 NUCLEAR ANTIGEN ANTIBODY: CPT

## 2024-08-06 PROCEDURE — 86140 C-REACTIVE PROTEIN: CPT

## 2024-08-06 PROCEDURE — 86037 ANCA TITER EACH ANTIBODY: CPT

## 2024-08-07 LAB
ALDOLASE SERPL-CCNC: 4.9 U/L (ref 3.3–10.3)
ENA SCL70 AB SER-ACNC: 0.3 AI (ref 0–0.9)
ENA SS-A AB SER-ACNC: <0.2 AI (ref 0–0.9)
ENA SS-B AB SER-ACNC: <0.2 AI (ref 0–0.9)
RHEUMATOID FACT SER QL LA: NEGATIVE
TOTAL IGE SMQN RAST: 12.3 KU/L (ref 0–113)

## 2024-08-08 ENCOUNTER — HOSPITAL ENCOUNTER (OUTPATIENT)
Dept: PULMONOLOGY | Facility: HOSPITAL | Age: 79
End: 2024-08-08
Attending: STUDENT IN AN ORGANIZED HEALTH CARE EDUCATION/TRAINING PROGRAM
Payer: COMMERCIAL

## 2024-08-08 DIAGNOSIS — J84.9 INTERSTITIAL LUNG DISEASE (HCC): ICD-10-CM

## 2024-08-08 PROCEDURE — 94726 PLETHYSMOGRAPHY LUNG VOLUMES: CPT

## 2024-08-08 PROCEDURE — 94729 DIFFUSING CAPACITY: CPT

## 2024-08-08 PROCEDURE — 94729 DIFFUSING CAPACITY: CPT | Performed by: INTERNAL MEDICINE

## 2024-08-08 PROCEDURE — 94726 PLETHYSMOGRAPHY LUNG VOLUMES: CPT | Performed by: INTERNAL MEDICINE

## 2024-08-08 PROCEDURE — 94060 EVALUATION OF WHEEZING: CPT

## 2024-08-08 PROCEDURE — 94060 EVALUATION OF WHEEZING: CPT | Performed by: INTERNAL MEDICINE

## 2024-08-08 PROCEDURE — 94760 N-INVAS EAR/PLS OXIMETRY 1: CPT

## 2024-08-08 RX ORDER — ALBUTEROL SULFATE 0.83 MG/ML
2.5 SOLUTION RESPIRATORY (INHALATION) ONCE AS NEEDED
Status: COMPLETED | OUTPATIENT
Start: 2024-08-08 | End: 2024-08-08

## 2024-08-08 RX ADMIN — ALBUTEROL SULFATE 2.5 MG: 2.5 SOLUTION RESPIRATORY (INHALATION) at 08:22

## 2024-08-09 DIAGNOSIS — J84.9 ILD (INTERSTITIAL LUNG DISEASE) (HCC): Primary | ICD-10-CM

## 2024-08-09 LAB
C-ANCA TITR SER IF: NORMAL TITER
CCP AB SER IA-ACNC: 0.7
MYELOPEROXIDASE AB SER IA-ACNC: <0.2 UNITS (ref 0–0.9)
P-ANCA ATYPICAL TITR SER IF: NORMAL TITER
P-ANCA TITR SER IF: NORMAL TITER
PROTEINASE3 AB SER IA-ACNC: <0.2 UNITS (ref 0–0.9)

## 2024-08-12 ENCOUNTER — OFFICE VISIT (OUTPATIENT)
Dept: FAMILY MEDICINE CLINIC | Facility: CLINIC | Age: 79
End: 2024-08-12
Payer: COMMERCIAL

## 2024-08-12 VITALS
SYSTOLIC BLOOD PRESSURE: 126 MMHG | TEMPERATURE: 97.9 F | WEIGHT: 121.6 LBS | BODY MASS INDEX: 24.52 KG/M2 | OXYGEN SATURATION: 97 % | DIASTOLIC BLOOD PRESSURE: 76 MMHG | HEIGHT: 59 IN | HEART RATE: 66 BPM

## 2024-08-12 DIAGNOSIS — F33.9 DEPRESSION, RECURRENT (HCC): ICD-10-CM

## 2024-08-12 DIAGNOSIS — E78.1 HYPERTRIGLYCERIDEMIA: ICD-10-CM

## 2024-08-12 DIAGNOSIS — R73.03 PREDIABETES: ICD-10-CM

## 2024-08-12 DIAGNOSIS — I10 ESSENTIAL HYPERTENSION: ICD-10-CM

## 2024-08-12 DIAGNOSIS — N18.31 STAGE 3A CHRONIC KIDNEY DISEASE (HCC): ICD-10-CM

## 2024-08-12 DIAGNOSIS — J84.9 INTERSTITIAL LUNG DISEASE (HCC): Primary | ICD-10-CM

## 2024-08-12 DIAGNOSIS — G47.00 INSOMNIA, UNSPECIFIED TYPE: ICD-10-CM

## 2024-08-12 DIAGNOSIS — M81.0 OSTEOPOROSIS, UNSPECIFIED OSTEOPOROSIS TYPE, UNSPECIFIED PATHOLOGICAL FRACTURE PRESENCE: ICD-10-CM

## 2024-08-12 PROCEDURE — G2211 COMPLEX E/M VISIT ADD ON: HCPCS | Performed by: STUDENT IN AN ORGANIZED HEALTH CARE EDUCATION/TRAINING PROGRAM

## 2024-08-12 PROCEDURE — 99214 OFFICE O/P EST MOD 30 MIN: CPT | Performed by: STUDENT IN AN ORGANIZED HEALTH CARE EDUCATION/TRAINING PROGRAM

## 2024-08-12 NOTE — ASSESSMENT & PLAN NOTE
Able to fall asleep and stay asleep as long as she takes trazodone.  Continue taking trazodone as prescribed.   Enbrel Counseling:  I discussed with the patient the risks of etanercept including but not limited to myelosuppression, immunosuppression, autoimmune hepatitis, demyelinating diseases, lymphoma, and infections.  The patient understands that monitoring is required including a PPD at baseline and must alert us or the primary physician if symptoms of infection or other concerning signs are noted.

## 2024-08-12 NOTE — ASSESSMENT & PLAN NOTE
Counseled patient on diet and exercise  Continue Fish Oil and Simvastatin as prescribed.  Recheck lipid panel.

## 2024-08-12 NOTE — ASSESSMENT & PLAN NOTE
Takes Fosamax once per week.  Getting exercise.  Also continues vitamin D supplementation.   Discussed fall precautions with patients.

## 2024-08-12 NOTE — ASSESSMENT & PLAN NOTE
Following with pulmonology.  Was prescribed Albuterol inhaler   Was also prescribed Astelin for post nasal drip.

## 2024-08-12 NOTE — ASSESSMENT & PLAN NOTE
Lab Results   Component Value Date    EGFR 60 07/25/2024    EGFR 65 12/14/2023    EGFR 55 08/03/2023    CREATININE 0.91 07/25/2024    CREATININE 0.85 12/14/2023    CREATININE 0.98 08/03/2023   Continue taking Avapro  Check Albumin/Cr ratio today.

## 2024-08-12 NOTE — PROGRESS NOTES
Ambulatory Visit  Name: Elza Kaplan      : 1945      MRN: [id number]  Encounter Provider: Trey Pérez MD  Encounter Date: 2024   Encounter department: [facility] PRIMARY CARE    Assessment & Plan   1. Interstitial lung disease (HCC)  Assessment & Plan:  Following with pulmonology.  Was prescribed Albuterol inhaler   Was also prescribed Astelin for post nasal drip.   2. Osteoporosis, unspecified osteoporosis type, unspecified pathological fracture presence  Assessment & Plan:  Takes Fosamax once per week.  Getting exercise.  Also continues vitamin D supplementation.   Discussed fall precautions with patients.   3. Essential hypertension  Assessment & Plan:  Continue taking Toprol and Avapro as prescribed.   Orders:  -     Albumin / creatinine urine ratio; Future  -     Hemoglobin A1C; Future  -     Lipid panel; Future  4. Stage 3a chronic kidney disease (HCC)  Assessment & Plan:  Lab Results   Component Value Date    EGFR 60 2024    EGFR 65 2023    EGFR 55 2023    CREATININE 0.91 2024    CREATININE 0.85 2023    CREATININE 0.98 2023   Continue taking Avapro  Check Albumin/Cr ratio today.  5. Insomnia, unspecified type  Assessment & Plan:  Able to fall asleep and stay asleep as long as she takes trazodone.  Continue taking trazodone as prescribed.  6. Hypertriglyceridemia  Assessment & Plan:  Counseled patient on diet and exercise  Continue Fish Oil and Simvastatin as prescribed.  Recheck lipid panel.  7. Prediabetes  Assessment & Plan:  A1C in 2023 was 6.1%.  Check A1C today  Orders:  -     Hemoglobin A1C; Future  8. Depression, recurrent (HCC)  Assessment & Plan:  Continue taking Wellbutrin as prescribed.       History of Present Illness     This is a pleasant 79 year old female with PMH of HTN, HLD, interstitial lung disease, osteoporosis, Stage 3a CKD, depression, anxiety, and insomnia who presents to the office for a follow up visit.  "She reports no complaints today. Was recently diagnosed with interstitial lung disease by her pulmonologist last week. Reports that she has been taking all of her medications as prescribed.        Review of Systems   Constitutional:  Negative for chills, fatigue and fever.   HENT:  Positive for postnasal drip. Negative for congestion, ear pain, sinus pressure, sinus pain and sore throat.    Eyes:  Negative for pain and visual disturbance.   Respiratory:  Positive for shortness of breath. Negative for cough.    Cardiovascular:  Negative for chest pain, palpitations and leg swelling.   Gastrointestinal:  Negative for abdominal pain, constipation, diarrhea, nausea and vomiting.   Genitourinary:  Negative for dysuria and hematuria.   Musculoskeletal:  Negative for arthralgias and back pain.   Skin:  Negative for color change and rash.   Neurological:  Negative for dizziness, seizures, syncope and weakness.   All other systems reviewed and are negative.      Objective     /76 (BP Location: Left arm, Patient Position: Sitting, Cuff Size: Adult)   Pulse 66   Temp 97.9 °F (36.6 °C) (Tympanic)   Ht 4' 11\" (1.499 m)   Wt 55.2 kg (121 lb 9.6 oz)   SpO2 97%   BMI 24.56 kg/m²     Physical Exam  Vitals and nursing note reviewed.   Constitutional:       General: She is not in acute distress.     Appearance: Normal appearance. She is well-developed.   HENT:      Head: Normocephalic and atraumatic.      Nose: Nose normal.      Mouth/Throat:      Mouth: Mucous membranes are moist.   Eyes:      Conjunctiva/sclera: Conjunctivae normal.      Pupils: Pupils are equal, round, and reactive to light.   Cardiovascular:      Rate and Rhythm: Normal rate and regular rhythm.      Heart sounds: No murmur heard.  Pulmonary:      Effort: Pulmonary effort is normal. No respiratory distress.      Comments: Coarse breath sounds appreciated bilaterally.  Abdominal:      General: Bowel sounds are normal. There is no distension.      " Palpations: Abdomen is soft.      Tenderness: There is no abdominal tenderness.   Musculoskeletal:         General: No swelling.      Cervical back: Neck supple.   Skin:     General: Skin is warm and dry.      Capillary Refill: Capillary refill takes less than 2 seconds.   Neurological:      Mental Status: She is alert.   Psychiatric:         Mood and Affect: Mood normal.       Administrative Statements

## 2024-08-14 ENCOUNTER — TELEPHONE (OUTPATIENT)
Age: 79
End: 2024-08-14

## 2024-08-14 DIAGNOSIS — J45.909 REACTIVE AIRWAY DISEASE WITHOUT COMPLICATION, UNSPECIFIED ASTHMA SEVERITY, UNSPECIFIED WHETHER PERSISTENT: ICD-10-CM

## 2024-08-14 DIAGNOSIS — R06.09 DYSPNEA ON EXERTION: Primary | ICD-10-CM

## 2024-08-14 RX ORDER — BUDESONIDE AND FORMOTEROL FUMARATE DIHYDRATE 80; 4.5 UG/1; UG/1
2 AEROSOL RESPIRATORY (INHALATION) 2 TIMES DAILY
Qty: 10.2 G | Refills: 1 | Status: SHIPPED | OUTPATIENT
Start: 2024-08-14

## 2024-08-14 NOTE — TELEPHONE ENCOUNTER
Pt calling in. States she has been using the albuterol inhaler PRN for SOB since her OV on 8/5 which no relief in symptoms. She is wondering if something else would be more effective.

## 2024-08-14 NOTE — TELEPHONE ENCOUNTER
Spoke with pt to relay Dr. Soriano's message. She will up the symbicort. Provided central scheduling number for her to schedule the echo. She will call them now. No further questions/concerns at this time.

## 2024-09-02 DIAGNOSIS — M81.0 OSTEOPOROSIS, UNSPECIFIED OSTEOPOROSIS TYPE, UNSPECIFIED PATHOLOGICAL FRACTURE PRESENCE: ICD-10-CM

## 2024-09-03 RX ORDER — ALENDRONATE SODIUM 70 MG/1
70 TABLET ORAL WEEKLY
Qty: 4 TABLET | Refills: 0 | Status: SHIPPED | OUTPATIENT
Start: 2024-09-03

## 2024-09-12 ENCOUNTER — HOSPITAL ENCOUNTER (OUTPATIENT)
Dept: NON INVASIVE DIAGNOSTICS | Facility: HOSPITAL | Age: 79
Discharge: HOME | End: 2024-09-12
Payer: COMMERCIAL

## 2024-09-12 VITALS
SYSTOLIC BLOOD PRESSURE: 126 MMHG | WEIGHT: 121 LBS | DIASTOLIC BLOOD PRESSURE: 76 MMHG | BODY MASS INDEX: 24.39 KG/M2 | HEIGHT: 59 IN | HEART RATE: 66 BPM

## 2024-09-12 DIAGNOSIS — R06.09 DYSPNEA ON EXERTION: ICD-10-CM

## 2024-09-12 LAB
AORTIC ROOT: 3.2 CM
AORTIC VALVE MEAN VELOCITY: 7.8 M/S
APICAL FOUR CHAMBER EJECTION FRACTION: 75 %
ASCENDING AORTA: 2.7 CM
AV LVOT MEAN GRADIENT: 2 MMHG
AV LVOT PEAK GRADIENT: 4 MMHG
AV MEAN GRADIENT: 3 MMHG
AV PEAK GRADIENT: 6 MMHG
AV VELOCITY RATIO: 0.88
BSA FOR ECHO PROCEDURE: 1.49 M2
DOP CALC AO PEAK VEL: 1.2 M/S
DOP CALC AO VTI: 29.33 CM
DOP CALC LVOT PEAK VEL VTI: 24.93 CM
DOP CALC LVOT PEAK VEL: 1.05 M/S
E WAVE DECELERATION TIME: 254 MS
E/A RATIO: 0.75
FRACTIONAL SHORTENING: 29 (ref 28–44)
INTERVENTRICULAR SEPTUM IN DIASTOLE (PARASTERNAL SHORT AXIS VIEW): 0.9 CM
INTERVENTRICULAR SEPTUM: 0.9 CM (ref 0.6–1.1)
LAAS-AP2: 16.6 CM2
LAAS-AP4: 13.3 CM2
LEFT ATRIUM SIZE: 2.9 CM
LEFT ATRIUM VOLUME (MOD BIPLANE): 38 ML
LEFT ATRIUM VOLUME INDEX (MOD BIPLANE): 25.5 ML/M2
LEFT INTERNAL DIMENSION IN SYSTOLE: 2.4 CM (ref 2.1–4)
LEFT VENTRICULAR INTERNAL DIMENSION IN DIASTOLE: 3.4 CM (ref 3.5–6)
LEFT VENTRICULAR POSTERIOR WALL IN END DIASTOLE: 0.8 CM
LEFT VENTRICULAR STROKE VOLUME: 26 ML
LVSV (TEICH): 26 ML
MV E'TISSUE VEL-SEP: 7 CM/S
MV PEAK A VEL: 1.18 M/S
MV PEAK E VEL: 89 CM/S
MV STENOSIS PRESSURE HALF TIME: 74 MS
MV VALVE AREA P 1/2 METHOD: 2.97
RIGHT ATRIAL 2D VOLUME: 25 ML
RIGHT ATRIUM AREA SYSTOLE A4C: 11.5 CM2
RIGHT VENTRICLE ID DIMENSION: 2.7 CM
SL CV LEFT ATRIUM LENGTH A2C: 4.6 CM
SL CV LV EF: 60
SL CV PED ECHO LEFT VENTRICLE DIASTOLIC VOLUME (MOD BIPLANE) 2D: 46 ML
SL CV PED ECHO LEFT VENTRICLE SYSTOLIC VOLUME (MOD BIPLANE) 2D: 21 ML
TRICUSPID ANNULAR PLANE SYSTOLIC EXCURSION: 2.3 CM

## 2024-09-12 PROCEDURE — 93306 TTE W/DOPPLER COMPLETE: CPT | Performed by: INTERNAL MEDICINE

## 2024-09-12 PROCEDURE — 93306 TTE W/DOPPLER COMPLETE: CPT

## 2024-09-29 DIAGNOSIS — M81.0 OSTEOPOROSIS, UNSPECIFIED OSTEOPOROSIS TYPE, UNSPECIFIED PATHOLOGICAL FRACTURE PRESENCE: ICD-10-CM

## 2024-09-30 RX ORDER — ALENDRONATE SODIUM 70 MG/1
70 TABLET ORAL WEEKLY
Qty: 4 TABLET | Refills: 0 | Status: SHIPPED | OUTPATIENT
Start: 2024-09-30

## 2024-10-25 DIAGNOSIS — M81.0 OSTEOPOROSIS, UNSPECIFIED OSTEOPOROSIS TYPE, UNSPECIFIED PATHOLOGICAL FRACTURE PRESENCE: ICD-10-CM

## 2024-10-25 RX ORDER — ALENDRONATE SODIUM 70 MG/1
70 TABLET ORAL WEEKLY
Qty: 4 TABLET | Refills: 0 | Status: SHIPPED | OUTPATIENT
Start: 2024-10-25

## 2024-10-28 ENCOUNTER — HOSPITAL ENCOUNTER (OUTPATIENT)
Dept: PULMONOLOGY | Facility: HOSPITAL | Age: 79
Discharge: HOME | End: 2024-10-28
Payer: COMMERCIAL

## 2024-10-28 DIAGNOSIS — J84.9 ILD (INTERSTITIAL LUNG DISEASE) (HCC): ICD-10-CM

## 2024-10-28 PROCEDURE — 94729 DIFFUSING CAPACITY: CPT

## 2024-10-28 PROCEDURE — 94060 EVALUATION OF WHEEZING: CPT | Performed by: INTERNAL MEDICINE

## 2024-10-28 PROCEDURE — 94726 PLETHYSMOGRAPHY LUNG VOLUMES: CPT

## 2024-10-28 PROCEDURE — 94618 PULMONARY STRESS TESTING: CPT | Performed by: INTERNAL MEDICINE

## 2024-10-28 PROCEDURE — 94726 PLETHYSMOGRAPHY LUNG VOLUMES: CPT | Performed by: INTERNAL MEDICINE

## 2024-10-28 PROCEDURE — 94060 EVALUATION OF WHEEZING: CPT

## 2024-10-28 PROCEDURE — 94729 DIFFUSING CAPACITY: CPT | Performed by: INTERNAL MEDICINE

## 2024-10-28 PROCEDURE — 94618 PULMONARY STRESS TESTING: CPT

## 2024-10-28 RX ORDER — ALBUTEROL SULFATE 0.83 MG/ML
2.5 SOLUTION RESPIRATORY (INHALATION) ONCE AS NEEDED
Status: COMPLETED | OUTPATIENT
Start: 2024-10-28 | End: 2024-10-28

## 2024-10-28 RX ADMIN — ALBUTEROL SULFATE 2.5 MG: 2.5 SOLUTION RESPIRATORY (INHALATION) at 11:02

## 2024-10-29 ENCOUNTER — APPOINTMENT (RX ONLY)
Dept: URBAN - NONMETROPOLITAN AREA CLINIC 4 | Facility: CLINIC | Age: 79
Setting detail: DERMATOLOGY
End: 2024-10-29

## 2024-10-29 DIAGNOSIS — L82.0 INFLAMED SEBORRHEIC KERATOSIS: ICD-10-CM

## 2024-10-29 PROCEDURE — ? TREATMENT REGIMEN

## 2024-10-29 PROCEDURE — ? LIQUID NITROGEN

## 2024-10-29 PROCEDURE — ? PHOTO-DOCUMENTATION

## 2024-10-29 PROCEDURE — ? COUNSELING

## 2024-10-29 PROCEDURE — 17110 DESTRUCTION B9 LES UP TO 14: CPT

## 2024-10-29 ASSESSMENT — PAIN INTENSITY VAS: HOW INTENSE IS YOUR PAIN 0 BEING NO PAIN, 10 BEING THE MOST SEVERE PAIN POSSIBLE?: NO PAIN

## 2024-10-29 ASSESSMENT — LOCATION DETAILED DESCRIPTION DERM
LOCATION DETAILED: RIGHT INFERIOR LATERAL MIDBACK
LOCATION DETAILED: RIGHT INFERIOR UPPER BACK
LOCATION DETAILED: RIGHT MID-UPPER BACK
LOCATION DETAILED: RIGHT SUPERIOR LATERAL MIDBACK
LOCATION DETAILED: LEFT INFERIOR LATERAL MIDBACK
LOCATION DETAILED: RIGHT INFERIOR LATERAL UPPER BACK
LOCATION DETAILED: LEFT MID-UPPER BACK

## 2024-10-29 ASSESSMENT — LOCATION SIMPLE DESCRIPTION DERM
LOCATION SIMPLE: LEFT LOWER BACK
LOCATION SIMPLE: LEFT UPPER BACK
LOCATION SIMPLE: RIGHT UPPER BACK
LOCATION SIMPLE: RIGHT LOWER BACK

## 2024-10-29 ASSESSMENT — LOCATION ZONE DERM: LOCATION ZONE: TRUNK

## 2024-10-29 NOTE — PROCEDURE: LIQUID NITROGEN
Consent: The patient's consent was obtained including but not limited to risks of crusting, scabbing, blistering, scarring, darker or lighter pigmentary change, recurrence, incomplete removal and infection.
Medical Necessity Clause: This procedure was medically necessary because the lesions that were treated were:
Medical Necessity Information: It is in your best interest to select a reason for this procedure from the list below. All of these items fulfill various CMS LCD requirements except the new and changing color options.
Spray Paint Technique: No
Spray Paint Text: The liquid nitrogen was applied to the skin utilizing a spray paint frosting technique.
Show Applicator Variable?: Yes
Post-Care Instructions: I reviewed with the patient in detail post-care instructions. Patient is to wear sunprotection, and avoid picking at any of the treated lesions. Pt may apply Vaseline to crusted or scabbing areas.
Duration Of Freeze Thaw-Cycle (Seconds): 3
Number Of Freeze-Thaw Cycles: 3 freeze-thaw cycles
Detail Level: Detailed
Application Tool (Optional): Cry-AC

## 2024-11-03 DIAGNOSIS — R00.0 TACHYCARDIA: ICD-10-CM

## 2024-11-04 RX ORDER — METOPROLOL SUCCINATE 25 MG/1
TABLET, EXTENDED RELEASE ORAL
Qty: 45 TABLET | Refills: 0 | Status: SHIPPED | OUTPATIENT
Start: 2024-11-04

## 2024-11-05 NOTE — PROGRESS NOTES
Pulmonary Follow Up Note  Elza Kaplan 79 y.o. female MRN: [id number]  11/6/2024    Assessment/Plan:    ILD (interstitial lung disease) (HCC)  -CT chest from 7/25/2024 with mild subpleural reticulations and mild bronchiectasis.  No honeycombing, probable UIP pattern  -Unknown chronicity as she has no prior CT imaging of the chest  -Serologic workup for ILD unremarkable.  -Initial PFTs 8/8/2024 with normal spirometry, lung volumes, and diffusion capacity score of 94%.  Repeat PFTs 10/28/2024 with no significant change except for mildly reduced diffusion capacity score of 72%  -6 MWT 10/20/2024 normal  -TTE unremarkable  -She continues to have BORRERO and dry nonproductive cough.  Her lungs have mild bibasilar Rales worse on the right with SpO2 99% on room air.  -Not finding relief with DANIELE or low-dose ICS/LABA inhaler    Plan:  -Given continued BORRERO, dry cough, and a decrease in diffusion capacity in the setting of normal TTE, will send for high-resolution CT chest to evaluate for progression of fibrosis.  If progressing, will discuss initiation of antifibrotic's  -Encourage patient to continue with regular physical exercise  -Can hold off on inhaler therapy as patient not finding benefit, also with normal spirometry  -UTD vaccinations  -If HRCT shows progression, will bring into the office to discuss antifibrotic's, otherwise follow-up in 3 months      Pulmonary nodules  -CT chest 7/25/2024 with 3 mm GILL and 5 mm RLL nodules.  She is a never smoker and likely low risk  -Per Fleischner 2017 guidelines, no further CT imaging necessary, however can review nodules again on next repeat imaging     Dyspnea on exertion  -Continues with BORRERO.  She was initially conversationally dyspneic at the beginning of her visit, but improved by the end  -Her symptoms seem out of proportion to findings on CT imaging.  PFTs had normal spirometry, lung volumes, but with mild decreased diffusion capacity  -6-minute walk test with no  exertional desaturations.  Echocardiogram unremarkable  -Will repeat HRCT to see if fibrosis progressing        Diagnoses and all orders for this visit:    ILD (interstitial lung disease) (HCC)  -     CT chest high resolution; Future    Bronchiectasis without complication (HCC)    Dyspnea on exertion    Pulmonary nodules      Return in about 3 months (around 2/6/2025).        History of Present Illness     Chief Complaint:   Chief Complaint   Patient presents with    Follow-up       Patient ID: Elza is a 79 y.o. y.o. female has a past medical history of Allergic, Allergic rhinitis, Anxiety (the passed 2 yrs), Arthritis, Cataract (About 5 years ago), Colon polyp, Depression, Diabetes (HCC), Diabetes mellitus (HCC), Hernia (2006), HL (hearing loss), benign breast biopsy (10/31/2018), Hyperlipidemia, Hypertension, Obesity, Osteoporosis (the passed 8 yrs), and Visual impairment.      11/6/2024  HPI: Elza Kaplan is a 79 y.o. female with a PMH of ILD, HTN, HLD, CKD 3A, and prediabetes who is here today for a follow-up visit.  She was previously seen in the office as a new pulmonary consult in August with Dr. Soriano for abnormal CT findings.  CT imaging of the chest performed 7/25/24 showed mild subpleural reticulations and mild bronchiectasis of unknown chronicity as she has had no prior CT imaging for comparison.  Also noted to have 3 mm GILL and 5 mm RLL nodules.  She had PFTs the beginning of August which showed normal spirometry, lung volumes, and diffusion capacity.  She was ordered serologic workup for ILD.  Serologic workup for ILD negative.  She was sent for repeat PFTs which were recently completed a week ago.  This showed normal spirometry and lung volumes again, but with mildly decreased diffusion capacity.  She had an echocardiogram which was unremarkable.  She was given trial of albuterol HFA, this did not work and she was provided with low-dose Symbicort.  She had no ambulatory desaturation on  6-minute walk test with PFTs.    Patient returns today, has not noticed any improvement with her BORRERO.  Still has dry cough, nothing worsens or exacerbates the cough.  Denies any wheezing.  Denies any heartburn, postnasal drainage.  Has occasional chest pains only after a coughing spell.  No exertional chest pain.  No lightheadedness, dizziness, or palpitations.  No lower extremity swelling.  She exercises regularly on her treadmill at home.  Walks on a slight incline slowly for 20 minutes, but needs to take frequent breaks.  She has not found any improvement of symptoms with albuterol or low-dose Symbicort.      Review of Systems   Constitutional:  Negative for activity change, chills, diaphoresis, fever and unexpected weight change.   HENT: Negative.     Respiratory:  Positive for cough and shortness of breath. Negative for chest tightness and wheezing.    Cardiovascular:  Positive for chest pain. Negative for palpitations and leg swelling.   Allergic/Immunologic: Negative.        Historical Information   Past Medical History:   Diagnosis Date    Allergic     Allergic rhinitis     Anxiety the passed 2 yrs    Arthritis     Cataract About 5 years ago    Colon polyp     Depression     Diabetes (HCC)     Diabetes mellitus (HCC)     Hernia 2006    Rightside    HL (hearing loss)     Hx of benign breast biopsy 10/31/2018    Left breast mammogram and ultrasound 10/10/2018.    Hyperlipidemia     Hypertension     Obesity     Osteoporosis the passed 8 yrs    Visual impairment      Past Surgical History:   Procedure Laterality Date    BREAST BIOPSY Left 1990    benign per patient report    BREAST BIOPSY Left 11/13/2018    benign   NO CLIP    BREAST SURGERY Left 1990    biopsy on left breast    CHOLECYSTECTOMY      EYE SURGERY  right 6/13/2018 and left 5/16/2018    HERNIA REPAIR      HYSTERECTOMY  04/15/2016    US GUIDED BREAST BIOPSY LEFT COMPLETE Left 11/13/2018     Family History   Problem Relation Age of Onset    Heart  failure Mother     Arthritis Mother     Hearing loss Mother     Heart disease Mother     Hypertension Mother     Hypothyroidism Mother     Diabetes Father     Alcohol abuse Father     Dementia Father     Stomach cancer Maternal Grandmother     No Known Problems Maternal Grandfather     No Known Problems Paternal Grandmother     No Known Problems Paternal Grandfather     No Known Problems Maternal Aunt     No Known Problems Maternal Aunt     No Known Problems Paternal Aunt     No Known Problems Paternal Aunt     No Known Problems Paternal Aunt     No Known Problems Paternal Aunt        Smoking history: She reports that she has never smoked. She has never used smokeless tobacco.    Occupational History:     Immunization History   Administered Date(s) Administered    COVID-19 MODERNA VACC 0.25 ML IM BOOSTER 11/01/2021    COVID-19 MODERNA VACC 0.5 ML IM 01/28/2021, 03/08/2021, 11/01/2021, 05/18/2022    COVID-19 Moderna Vac BIVALENT 12 Yr+ IM 0.5 ML 10/29/2022    COVID-19 Moderna mRNA Vaccine 12 Yr+ 50 mcg/0.5 mL (Spikevax) 10/17/2024    INFLUENZA 09/25/2013, 09/07/2018, 09/15/2021, 10/26/2022, 10/31/2023    Influenza Split High Dose Preservative Free IM 11/09/2017    Influenza, high dose seasonal 0.7 mL 09/07/2018, 10/24/2019, 10/07/2020    Influenza, seasonal, injectable 10/20/2010, 01/20/2012, 09/18/2012, 10/28/2014, 09/16/2016    Pneumococcal Conjugate 13-Valent 11/09/2017    Pneumococcal Polysaccharide PPV23 08/23/2010    Tdap 06/04/2020    Zoster Vaccine Recombinant 02/08/2022, 04/27/2022       Meds/Allergies     Current Outpatient Medications:     alendronate (FOSAMAX) 70 mg tablet, Take 1 tablet by mouth once a week, Disp: 4 tablet, Rfl: 0    azelastine (ASTELIN) 0.1 % nasal spray, 1 spray into each nostril 2 (two) times a day Use in each nostril as directed, Disp: 1 mL, Rfl: 3    budesonide-formoterol (Symbicort) 80-4.5 MCG/ACT inhaler, Inhale 2 puffs 2 (two) times a day Rinse mouth after use., Disp: 10.2 g,  "Rfl: 1    cholecalciferol (VITAMIN D3) 1,000 units tablet, Take 1,000 Units by mouth daily, Disp: , Rfl:     irbesartan (AVAPRO) 150 mg tablet, Take 1 tablet by mouth once daily, Disp: 100 tablet, Rfl: 1    metoprolol succinate (TOPROL-XL) 25 mg 24 hr tablet, Take 1/2 (one-half) tablet by mouth once daily, Disp: 45 tablet, Rfl: 0    multivitamin (THERAGRAN) TABS, Take 1 tablet by mouth daily, Disp: , Rfl:     Omega 3 1000 MG CAPS, Take 1,000 mg by mouth 2 (two) times a day, Disp: , Rfl:     simvastatin (ZOCOR) 40 mg tablet, Take 1 tablet by mouth once daily, Disp: 90 tablet, Rfl: 1    traZODone (DESYREL) 50 mg tablet, TAKE 1 TABLET BY MOUTH ONCE DAILY AT BEDTIME AS NEEDED FOR SLEEP, Disp: 30 tablet, Rfl: 5    albuterol (Ventolin HFA) 90 mcg/act inhaler, Inhale 2 puffs every 4 (four) hours as needed for wheezing or shortness of breath, Disp: 18 g, Rfl: 3    buPROPion (WELLBUTRIN XL) 150 mg 24 hr tablet, Take 1 tablet by mouth once daily (Patient not taking: Reported on 11/6/2024), Disp: 90 tablet, Rfl: 1    ipratropium (ATROVENT) 0.06 % nasal spray, 2 sprays into each nostril 2 (two) times a day, Disp: 15 mL, Rfl: 11    Allergies:   Allergies   Allergen Reactions    Metformin Diarrhea         Vitals:  Vitals:    11/06/24 0751   BP: 124/74   BP Location: Left arm   Patient Position: Sitting   Cuff Size: Standard   Pulse: 72   Resp: 18   Temp: 97.6 °F (36.4 °C)   TempSrc: Temporal   SpO2: 99%   Weight: 55.3 kg (122 lb)   Height: 4' 11\" (1.499 m)   Oxygen Therapy  SpO2: 99 %  .  Wt Readings from Last 3 Encounters:   11/06/24 55.3 kg (122 lb)   09/12/24 54.9 kg (121 lb)   08/12/24 55.2 kg (121 lb 9.6 oz)     Body mass index is 24.64 kg/m².    Physical Exam  Vitals and nursing note reviewed.   Constitutional:       General: She is not in acute distress.     Appearance: Normal appearance. She is well-developed.   Cardiovascular:      Rate and Rhythm: Normal rate and regular rhythm.      Heart sounds: Normal heart sounds, " S1 normal and S2 normal. No murmur heard.  Pulmonary:      Effort: Pulmonary effort is normal.      Breath sounds: Examination of the right-lower field reveals rales. Examination of the left-lower field reveals rales. Rales present. No decreased breath sounds, wheezing or rhonchi.   Musculoskeletal:         General: No swelling.      Right lower leg: No edema.      Left lower leg: No edema.   Neurological:      Mental Status: She is alert.   Psychiatric:         Mood and Affect: Mood and affect normal.         Behavior: Behavior normal. Behavior is cooperative.           Labs: I have personally reviewed pertinent lab results.  Lab Results   Component Value Date    WBC 9.12 07/25/2024    HGB 15.3 07/25/2024    HCT 46.8 (H) 07/25/2024    MCV 95 07/25/2024     07/25/2024     Lab Results   Component Value Date    CALCIUM 9.5 07/25/2024    K 3.5 07/25/2024    CO2 23 07/25/2024     07/25/2024    BUN 18 07/25/2024    CREATININE 0.91 07/25/2024     Lab Results   Component Value Date    IGE 12.3 08/06/2024     Lab Results   Component Value Date    ALT 25 12/14/2023    AST 22 12/14/2023    ALKPHOS 63 12/14/2023       Studies:    Imaging and other studies: I have personally reviewed pertinent reports and I have personally reviewed pertinent films in PACS     CT chest 7/25/2024   Mild bilateral lower lobe juxtapleural reticulation and mild cylindrical bronchiectasis/bronchiolectasis. According to the American Thor[employer] Society guidelines, this is compatible with a mild probable UIP pattern of pulmonary fibrosis.      3 mm juxtapleural nodule posterior left upper lobe, and 5 mm juxtapleural nodule right lower lobe. The patient is a never smoker and no follow-up is recommended per 2017 Fleischner guidelines. They could be benign intrapulmonary lymph nodes        EKG, Pathology, and Other Studies: I have personally reviewed pertinent reports.      Echo 9/12/2024    Left Ventricle: Left ventricular cavity size is  normal. Wall thickness is normal. There is mild concentric hypertrophy. The left ventricular ejection fraction is 60%. Systolic function is normal. Wall motion is normal. Diastolic function is mildly abnormal, consistent with grade I (abnormal) relaxation.     Pulmonary function testing:     Pulmonary Functions Testing Results: 10/28/2024    FEV1/FVC ratio 104%    FEV1 107% predicted  % predicted  (-) response to bronchodilators  TLC 88 % predicted  RV 79 % predicted  DLCO corrected for hemoglobin 72 % predicted    Impression: Normal spirometry.  No postbronchodilator response.  Normal lung volumes.  Mildly decreased corrected diffusion capacity.  Normal flow volume loop.

## 2024-11-06 ENCOUNTER — OFFICE VISIT (OUTPATIENT)
Dept: PULMONOLOGY | Facility: CLINIC | Age: 79
End: 2024-11-06
Payer: COMMERCIAL

## 2024-11-06 VITALS
HEIGHT: 59 IN | OXYGEN SATURATION: 99 % | HEART RATE: 72 BPM | TEMPERATURE: 97.6 F | SYSTOLIC BLOOD PRESSURE: 124 MMHG | RESPIRATION RATE: 18 BRPM | WEIGHT: 122 LBS | BODY MASS INDEX: 24.6 KG/M2 | DIASTOLIC BLOOD PRESSURE: 74 MMHG

## 2024-11-06 DIAGNOSIS — J47.9 BRONCHIECTASIS WITHOUT COMPLICATION (HCC): ICD-10-CM

## 2024-11-06 DIAGNOSIS — R91.8 PULMONARY NODULES: ICD-10-CM

## 2024-11-06 DIAGNOSIS — J84.9 ILD (INTERSTITIAL LUNG DISEASE) (HCC): Primary | ICD-10-CM

## 2024-11-06 DIAGNOSIS — R06.09 DYSPNEA ON EXERTION: ICD-10-CM

## 2024-11-06 PROCEDURE — 99213 OFFICE O/P EST LOW 20 MIN: CPT

## 2024-11-06 NOTE — ASSESSMENT & PLAN NOTE
-CT chest from 7/25/2024 with mild subpleural reticulations and mild bronchiectasis.  No honeycombing, probable UIP pattern  -Unknown chronicity as she has no prior CT imaging of the chest  -Serologic workup for ILD unremarkable.  -Initial PFTs 8/8/2024 with normal spirometry, lung volumes, and diffusion capacity score of 94%.  Repeat PFTs 10/28/2024 with no significant change except for mildly reduced diffusion capacity score of 72%  -6 MWT 10/20/2024 normal  -TTE unremarkable  -She continues to have BORRERO and dry nonproductive cough.  Her lungs have mild bibasilar Rales worse on the right with SpO2 99% on room air.  -Not finding relief with DANIELE or low-dose ICS/LABA inhaler    Plan:  -Given continued OBRRERO, dry cough, and a decrease in diffusion capacity in the setting of normal TTE, will send for high-resolution CT chest to evaluate for progression of fibrosis.  If progressing, will discuss initiation of antifibrotic's  -Encourage patient to continue with regular physical exercise  -Can hold off on inhaler therapy as patient not finding benefit, also with normal spirometry  -UTD vaccinations  -If HRCT shows progression, will bring into the office to discuss antifibrotic's, otherwise follow-up in 3 months

## 2024-11-06 NOTE — ASSESSMENT & PLAN NOTE
-Continues with BORRERO.  She was initially conversationally dyspneic at the beginning of her visit, but improved by the end  -Her symptoms seem out of proportion to findings on CT imaging.  PFTs had normal spirometry, lung volumes, but with mild decreased diffusion capacity  -6-minute walk test with no exertional desaturations.  Echocardiogram unremarkable  -Will repeat HRCT to see if fibrosis progressing

## 2024-11-06 NOTE — ASSESSMENT & PLAN NOTE
-CT chest 7/25/2024 with 3 mm GILL and 5 mm RLL nodules.  She is a never smoker and likely low risk  -Per Fleischner 2017 guidelines, no further CT imaging necessary, however can review nodules again on next repeat imaging

## 2024-11-12 ENCOUNTER — HOSPITAL ENCOUNTER (OUTPATIENT)
Dept: CT IMAGING | Facility: HOSPITAL | Age: 79
Discharge: HOME | End: 2024-11-12
Payer: COMMERCIAL

## 2024-11-12 DIAGNOSIS — J84.9 ILD (INTERSTITIAL LUNG DISEASE) (HCC): ICD-10-CM

## 2024-11-12 PROCEDURE — 71250 CT THORAX DX C-: CPT

## 2024-11-13 ENCOUNTER — HOSPITAL ENCOUNTER (OUTPATIENT)
Dept: MAMMOGRAPHY | Facility: HOSPITAL | Age: 79
Discharge: HOME | End: 2024-11-13
Attending: SPECIALIST
Payer: COMMERCIAL

## 2024-11-13 DIAGNOSIS — Z12.31 ENCOUNTER FOR SCREENING MAMMOGRAM FOR MALIGNANT NEOPLASM OF BREAST: ICD-10-CM

## 2024-11-13 PROCEDURE — 77063 BREAST TOMOSYNTHESIS BI: CPT

## 2024-11-13 PROCEDURE — 77067 SCR MAMMO BI INCL CAD: CPT

## 2024-11-19 ENCOUNTER — RESULTS FOLLOW-UP (OUTPATIENT)
Dept: PULMONOLOGY | Facility: CLINIC | Age: 79
End: 2024-11-19

## 2024-12-01 DIAGNOSIS — M81.0 OSTEOPOROSIS, UNSPECIFIED OSTEOPOROSIS TYPE, UNSPECIFIED PATHOLOGICAL FRACTURE PRESENCE: ICD-10-CM

## 2024-12-01 DIAGNOSIS — G47.00 INSOMNIA, UNSPECIFIED TYPE: ICD-10-CM

## 2024-12-03 ENCOUNTER — APPOINTMENT (OUTPATIENT)
Dept: URBAN - NONMETROPOLITAN AREA CLINIC 4 | Facility: CLINIC | Age: 79
Setting detail: DERMATOLOGY
End: 2024-12-03

## 2024-12-03 DIAGNOSIS — L82.0 INFLAMED SEBORRHEIC KERATOSIS: ICD-10-CM

## 2024-12-03 DIAGNOSIS — D18.0 HEMANGIOMA: ICD-10-CM

## 2024-12-03 DIAGNOSIS — L81.4 OTHER MELANIN HYPERPIGMENTATION: ICD-10-CM

## 2024-12-03 DIAGNOSIS — Z71.89 OTHER SPECIFIED COUNSELING: ICD-10-CM

## 2024-12-03 DIAGNOSIS — L82.1 OTHER SEBORRHEIC KERATOSIS: ICD-10-CM

## 2024-12-03 DIAGNOSIS — L57.0 ACTINIC KERATOSIS: ICD-10-CM

## 2024-12-03 DIAGNOSIS — L57.8 OTHER SKIN CHANGES DUE TO CHRONIC EXPOSURE TO NONIONIZING RADIATION: ICD-10-CM

## 2024-12-03 DIAGNOSIS — I83.9 ASYMPTOMATIC VARICOSE VEINS OF LOWER EXTREMITIES: ICD-10-CM

## 2024-12-03 DIAGNOSIS — D22 MELANOCYTIC NEVI: ICD-10-CM

## 2024-12-03 PROBLEM — D22.61 MELANOCYTIC NEVI OF RIGHT UPPER LIMB, INCLUDING SHOULDER: Status: ACTIVE | Noted: 2024-12-03

## 2024-12-03 PROBLEM — I83.93 ASYMPTOMATIC VARICOSE VEINS OF BILATERAL LOWER EXTREMITIES: Status: ACTIVE | Noted: 2024-12-03

## 2024-12-03 PROBLEM — D22.5 MELANOCYTIC NEVI OF TRUNK: Status: ACTIVE | Noted: 2024-12-03

## 2024-12-03 PROBLEM — D18.01 HEMANGIOMA OF SKIN AND SUBCUTANEOUS TISSUE: Status: ACTIVE | Noted: 2024-12-03

## 2024-12-03 PROCEDURE — 17110 DESTRUCTION B9 LES UP TO 14: CPT

## 2024-12-03 PROCEDURE — ? PHOTO-DOCUMENTATION

## 2024-12-03 PROCEDURE — 17000 DESTRUCT PREMALG LESION: CPT | Mod: 59

## 2024-12-03 PROCEDURE — ? LIQUID NITROGEN

## 2024-12-03 PROCEDURE — ? COUNSELING

## 2024-12-03 PROCEDURE — ? SUNSCREEN RECOMMENDATIONS

## 2024-12-03 PROCEDURE — ? TREATMENT REGIMEN

## 2024-12-03 PROCEDURE — ? FULL BODY SKIN EXAM

## 2024-12-03 PROCEDURE — 99213 OFFICE O/P EST LOW 20 MIN: CPT | Mod: 25

## 2024-12-03 RX ORDER — ALENDRONATE SODIUM 70 MG/1
70 TABLET ORAL WEEKLY
Qty: 4 TABLET | Refills: 0 | Status: SHIPPED | OUTPATIENT
Start: 2024-12-03

## 2024-12-03 RX ORDER — TRAZODONE HYDROCHLORIDE 50 MG/1
50 TABLET, FILM COATED ORAL
Qty: 30 TABLET | Refills: 5 | Status: SHIPPED | OUTPATIENT
Start: 2024-12-03

## 2024-12-03 ASSESSMENT — LOCATION ZONE DERM
LOCATION ZONE: ARM
LOCATION ZONE: TRUNK
LOCATION ZONE: FACE
LOCATION ZONE: LEG

## 2024-12-03 ASSESSMENT — LOCATION SIMPLE DESCRIPTION DERM
LOCATION SIMPLE: UPPER BACK
LOCATION SIMPLE: RIGHT THIGH
LOCATION SIMPLE: RIGHT UPPER BACK
LOCATION SIMPLE: RIGHT FOREHEAD
LOCATION SIMPLE: RIGHT SHOULDER
LOCATION SIMPLE: LEFT SHOULDER
LOCATION SIMPLE: LEFT PRETIBIAL REGION
LOCATION SIMPLE: RIGHT TEMPLE
LOCATION SIMPLE: LEFT UPPER BACK
LOCATION SIMPLE: LEFT THIGH

## 2024-12-03 ASSESSMENT — LOCATION DETAILED DESCRIPTION DERM
LOCATION DETAILED: RIGHT INFERIOR MEDIAL FOREHEAD
LOCATION DETAILED: RIGHT CENTRAL TEMPLE
LOCATION DETAILED: RIGHT MID-UPPER BACK
LOCATION DETAILED: RIGHT ANTERIOR PROXIMAL THIGH
LOCATION DETAILED: LEFT MID-UPPER BACK
LOCATION DETAILED: RIGHT INFERIOR UPPER BACK
LOCATION DETAILED: SUPERIOR THORACIC SPINE
LOCATION DETAILED: RIGHT POSTERIOR SHOULDER
LOCATION DETAILED: LEFT POSTERIOR SHOULDER
LOCATION DETAILED: LEFT INFERIOR UPPER BACK
LOCATION DETAILED: LEFT DISTAL PRETIBIAL REGION
LOCATION DETAILED: LEFT ANTERIOR SHOULDER
LOCATION DETAILED: LEFT ANTERIOR PROXIMAL THIGH
LOCATION DETAILED: RIGHT ANTERIOR SHOULDER
LOCATION DETAILED: LEFT MEDIAL UPPER BACK

## 2024-12-03 ASSESSMENT — TOTAL NUMBER OF LESIONS: # OF LESIONS?: 1

## 2024-12-03 ASSESSMENT — PAIN INTENSITY VAS
HOW INTENSE IS YOUR PAIN 0 BEING NO PAIN, 10 BEING THE MOST SEVERE PAIN POSSIBLE?: NO PAIN
HOW INTENSE IS YOUR PAIN 0 BEING NO PAIN, 10 BEING THE MOST SEVERE PAIN POSSIBLE?: 1/10 PAIN

## 2024-12-03 NOTE — PROCEDURE: LIQUID NITROGEN
Medical Necessity Clause: This procedure was medically necessary because the lesions that were treated were:
Spray Paint Technique: No
Show Aperture Variable?: Yes
Number Of Freeze-Thaw Cycles: 3 freeze-thaw cycles
Detail Level: Detailed
Duration Of Freeze Thaw-Cycle (Seconds): 3
Post-Care Instructions: I reviewed with the patient in detail post-care instructions. Patient is to wear sunprotection, and avoid picking at any of the treated lesions. Pt may apply Vaseline to crusted or scabbing areas.
Spray Paint Text: The liquid nitrogen was applied to the skin utilizing a spray paint frosting technique.
Medical Necessity Information: It is in your best interest to select a reason for this procedure from the list below. All of these items fulfill various CMS LCD requirements except the new and changing color options.
Application Tool (Optional): Cry-AC
Consent: The patient's consent was obtained including but not limited to risks of crusting, scabbing, blistering, scarring, darker or lighter pigmentary change, recurrence, incomplete removal and infection.
Number Of Freeze-Thaw Cycles: 1 freeze-thaw cycle
Detail Level: Zone

## 2024-12-15 ENCOUNTER — TELEPHONE (OUTPATIENT)
Dept: FAMILY MEDICINE CLINIC | Facility: CLINIC | Age: 79
End: 2024-12-15

## 2024-12-15 DIAGNOSIS — E78.00 HYPERCHOLESTEROLEMIA: ICD-10-CM

## 2024-12-17 RX ORDER — SIMVASTATIN 40 MG
40 TABLET ORAL DAILY
Qty: 30 TABLET | Refills: 0 | Status: SHIPPED | OUTPATIENT
Start: 2024-12-17

## 2024-12-17 NOTE — TELEPHONE ENCOUNTER
Patient called and was advised   shreyas GUTIERREZ    12/17/24  8:17 AM  Note      Patient needs updated blood work and has previously placed orders. Please contact patient to go for labs. Courtesy refill provided.            Placed patient on CPAP with 2L oxygen bled in, patient tolerating well at this time.

## 2024-12-19 ENCOUNTER — APPOINTMENT (OUTPATIENT)
Age: 79
End: 2024-12-19
Payer: COMMERCIAL

## 2024-12-19 DIAGNOSIS — I10 ESSENTIAL HYPERTENSION: ICD-10-CM

## 2024-12-19 DIAGNOSIS — R73.03 PREDIABETES: ICD-10-CM

## 2024-12-19 LAB
CHOLEST SERPL-MCNC: 110 MG/DL (ref ?–200)
CREAT UR-MCNC: 132.8 MG/DL
EST. AVERAGE GLUCOSE BLD GHB EST-MCNC: 134 MG/DL
HBA1C MFR BLD: 6.3 %
HDLC SERPL-MCNC: 54 MG/DL
LDLC SERPL CALC-MCNC: 27 MG/DL (ref 0–100)
MICROALBUMIN UR-MCNC: 18.7 MG/L
MICROALBUMIN/CREAT 24H UR: 14 MG/G CREATININE (ref 0–30)
NONHDLC SERPL-MCNC: 56 MG/DL
TRIGL SERPL-MCNC: 146 MG/DL (ref ?–150)

## 2024-12-19 PROCEDURE — 83036 HEMOGLOBIN GLYCOSYLATED A1C: CPT

## 2024-12-19 PROCEDURE — 36415 COLL VENOUS BLD VENIPUNCTURE: CPT

## 2024-12-19 PROCEDURE — 82570 ASSAY OF URINE CREATININE: CPT

## 2024-12-19 PROCEDURE — 82043 UR ALBUMIN QUANTITATIVE: CPT

## 2024-12-19 PROCEDURE — 80061 LIPID PANEL: CPT

## 2025-01-03 DIAGNOSIS — M81.0 OSTEOPOROSIS, UNSPECIFIED OSTEOPOROSIS TYPE, UNSPECIFIED PATHOLOGICAL FRACTURE PRESENCE: ICD-10-CM

## 2025-01-03 RX ORDER — ALENDRONATE SODIUM 70 MG/1
70 TABLET ORAL WEEKLY
Qty: 4 TABLET | Refills: 0 | Status: SHIPPED | OUTPATIENT
Start: 2025-01-03

## 2025-01-12 DIAGNOSIS — E78.00 HYPERCHOLESTEROLEMIA: ICD-10-CM

## 2025-01-13 RX ORDER — SIMVASTATIN 40 MG
40 TABLET ORAL DAILY
Qty: 30 TABLET | Refills: 5 | Status: SHIPPED | OUTPATIENT
Start: 2025-01-13

## 2025-01-24 DIAGNOSIS — M81.0 OSTEOPOROSIS, UNSPECIFIED OSTEOPOROSIS TYPE, UNSPECIFIED PATHOLOGICAL FRACTURE PRESENCE: ICD-10-CM

## 2025-01-24 RX ORDER — ALENDRONATE SODIUM 70 MG/1
70 TABLET ORAL WEEKLY
Qty: 4 TABLET | Refills: 0 | Status: SHIPPED | OUTPATIENT
Start: 2025-01-24

## 2025-01-26 DIAGNOSIS — R00.0 TACHYCARDIA: ICD-10-CM

## 2025-01-27 RX ORDER — METOPROLOL SUCCINATE 25 MG/1
TABLET, EXTENDED RELEASE ORAL
Qty: 45 TABLET | Refills: 1 | Status: SHIPPED | OUTPATIENT
Start: 2025-01-27

## 2025-02-02 DIAGNOSIS — I10 ESSENTIAL HYPERTENSION: ICD-10-CM

## 2025-02-02 DIAGNOSIS — F32.A DEPRESSION, UNSPECIFIED DEPRESSION TYPE: ICD-10-CM

## 2025-02-02 RX ORDER — IRBESARTAN 150 MG/1
150 TABLET ORAL DAILY
Qty: 100 TABLET | Refills: 0 | Status: SHIPPED | OUTPATIENT
Start: 2025-02-02

## 2025-02-02 RX ORDER — BUPROPION HYDROCHLORIDE 150 MG/1
150 TABLET ORAL DAILY
Qty: 90 TABLET | Refills: 0 | Status: SHIPPED | OUTPATIENT
Start: 2025-02-02

## 2025-02-11 ENCOUNTER — OFFICE VISIT (OUTPATIENT)
Dept: PULMONOLOGY | Facility: CLINIC | Age: 80
End: 2025-02-11
Payer: COMMERCIAL

## 2025-02-11 VITALS
RESPIRATION RATE: 18 BRPM | TEMPERATURE: 97.6 F | WEIGHT: 128 LBS | DIASTOLIC BLOOD PRESSURE: 78 MMHG | HEART RATE: 90 BPM | HEIGHT: 59 IN | OXYGEN SATURATION: 98 % | BODY MASS INDEX: 25.8 KG/M2 | SYSTOLIC BLOOD PRESSURE: 140 MMHG

## 2025-02-11 DIAGNOSIS — J47.9 BRONCHIECTASIS WITHOUT COMPLICATION (HCC): ICD-10-CM

## 2025-02-11 DIAGNOSIS — R91.8 PULMONARY NODULES: ICD-10-CM

## 2025-02-11 DIAGNOSIS — R05.3 CHRONIC COUGH: ICD-10-CM

## 2025-02-11 DIAGNOSIS — R06.09 DYSPNEA ON EXERTION: ICD-10-CM

## 2025-02-11 DIAGNOSIS — J84.9 ILD (INTERSTITIAL LUNG DISEASE) (HCC): Primary | ICD-10-CM

## 2025-02-11 DIAGNOSIS — R09.82 POST-NASAL DRIP: ICD-10-CM

## 2025-02-11 PROCEDURE — 99214 OFFICE O/P EST MOD 30 MIN: CPT

## 2025-02-11 RX ORDER — BENZONATATE 100 MG/1
100 CAPSULE ORAL 3 TIMES DAILY PRN
Qty: 20 CAPSULE | Refills: 1 | Status: SHIPPED | OUTPATIENT
Start: 2025-02-11

## 2025-02-11 NOTE — ASSESSMENT & PLAN NOTE
-Unchanged.  Suspect component of deconditioning as symptoms seem out of proportion to pulmonary and cardiac testing.  Patient not very active at home  -Enroll in pulmonary rehab    Orders:    Ambulatory Referral to Pulmonary Rehabilitation; Future    Complete PFT with post Bronchodilator and Six Minute walk; Future

## 2025-02-11 NOTE — PATIENT INSTRUCTIONS
Referral placed for Pulmonary Rehab  Complete PFTs in April/May  Tessalon perles 3 times daily as needed for cough

## 2025-02-11 NOTE — ASSESSMENT & PLAN NOTE
-Most recent HRCT with stable 3 mm GILL and 5 mm RLL nodules  -Patient has no prior smoking history and likely low risk  -Per Fleischner 2017 guidelines, no further CT imaging necessary

## 2025-02-11 NOTE — ASSESSMENT & PLAN NOTE
-HRCT from 11/12/2024 with stable mild bilateral lower lobe juxtapleural reticulations with mild bronchiolectasis.  No honeycombing  -We are unclear of the chronicity of her ILD as there is no prior CT imaging of the chest prior to 2024  -Serological workup for autoimmune/CTD was unremarkable  -There has been no restriction on PFTs, but with mildly reduced diffusion capacity of 72%  -Normal 6-minute walk test with no evidence of desaturations  -TTE unremarkable  -Continues to experience BORRERO and dry nonproductive cough, no worsening of symptoms.  She has mild bibasilar Rales  -Previously trialed on albuterol and low-dose Symbicort with no relief    Plan:  -No need for antibiotics at this time as ILD stable on HRCT and no progression of symptoms  -Will repeat PFTs with 6-minute walk in April/May for 6-month follow-up to evaluate for any changes in lung function or progression of ILD  -Referred to pulmonary rehab  -UTD vaccinations  -Follow-up in 3 months or sooner if needed    Orders:    Ambulatory Referral to Pulmonary Rehabilitation; Future    Complete PFT with post Bronchodilator and Six Minute walk; Future

## 2025-02-11 NOTE — PROGRESS NOTES
Name: Elza Kaplan      : 1945      MRN: [id number]  Encounter Provider: JENI Bonilla  Encounter Date: 2025   Encounter department: [facility]  :  Assessment & Plan  ILD (interstitial lung disease) (HCC)  -HRCT from 2024 with stable mild bilateral lower lobe juxtapleural reticulations with mild bronchiolectasis.  No honeycombing  -We are unclear of the chronicity of her ILD as there is no prior CT imaging of the chest prior to   -Serological workup for autoimmune/CTD was unremarkable  -There has been no restriction on PFTs, but with mildly reduced diffusion capacity of 72%  -Normal 6-minute walk test with no evidence of desaturations  -TTE unremarkable  -Continues to experience BORRERO and dry nonproductive cough, no worsening of symptoms.  She has mild bibasilar Rales  -Previously trialed on albuterol and low-dose Symbicort with no relief    Plan:  -No need for antibiotics at this time as ILD stable on HRCT and no progression of symptoms  -Will repeat PFTs with 6-minute walk in April/May for 6-month follow-up to evaluate for any changes in lung function or progression of ILD  -Referred to pulmonary rehab  -UTD vaccinations  -Follow-up in 3 months or sooner if needed    Orders:    Ambulatory Referral to Pulmonary Rehabilitation; Future    Complete PFT with post Bronchodilator and Six Minute walk; Future    Dyspnea on exertion  -Unchanged.  Suspect component of deconditioning as symptoms seem out of proportion to pulmonary and cardiac testing.  Patient not very active at home  -Enroll in pulmonary rehab    Orders:    Ambulatory Referral to Pulmonary Rehabilitation; Future    Complete PFT with post Bronchodilator and Six Minute walk; Future    Bronchiectasis without complication (HCC)  -Mild bronchiectasis on CT imaging.  No current mucus production or issues with mucus clearance.  Continue to monitor       Post-nasal drip  -Encouraged use of saline  nasal rinses and continue Astelin nasal spray       Pulmonary nodules  -Most recent HRCT with stable 3 mm GILL and 5 mm RLL nodules  -Patient has no prior smoking history and likely low risk  -Per Fleischner 2017 guidelines, no further CT imaging necessary       Chronic cough  -Has frequent dry cough with no clear triggers.  PND could possibly be contributing in addition to her ILD.  Denies any GERD symptoms  -Recommend saline nasal rinses and Astelin nasal spray  -Will add Tessalon Perles 3 times daily as needed    Orders:    benzonatate (TESSALON PERLES) 100 mg capsule; Take 1 capsule (100 mg total) by mouth 3 (three) times a day as needed for cough        History of Present Illness   Elza Kaplan is a 79 y.o. female with a PMH of ILD, HTN, HLD, CKD 3A, and prediabetes who is here today for a follow-up visit.  Patient initially referred to pulmonary in August due to abnormal CT findings.  CT chest performed on 7/25/2024 showed mild subpleural reticulations and mild bronchiectasis of unknown chronicity as she had no prior CT imaging for comparison.  Also had 2 small pulmonary nodules measuring up to 5 mm.  She had serologic workup for ILD which was all negative. She had PFTs which showed no restriction, but her repeat PFTs in November which revealed a mildly reduced diffusion capacity.  No evidence of desaturation on 6-minute walk test.  She had an echocardiogram which was negative.  She had a high-resolution CT chest in November that showed stable bilateral lower lobe reticulations with mild bronchiolectasis consistent with UIP pattern of ILD.    Patient returns today, states symptoms are unchanged since her last office visit.  Still gets short of breath with most activities.  Has frequent dry nonproductive cough with no clear triggers.  Denies any wheezing, chest pain, or chest tightness.  No lower extremity swelling.  Has a postnasal drip which is being treated with Astelin nasal spray.  Denies any GERD  symptoms.  No recent exacerbations or respiratory infections since her last office visit requiring antibiotics or steroids.    Review of Systems   Constitutional:  Negative for activity change, chills, diaphoresis, fever and unexpected weight change.   HENT:  Positive for postnasal drip. Negative for congestion, rhinorrhea, sore throat, trouble swallowing and voice change.    Respiratory:  Positive for cough and shortness of breath. Negative for chest tightness and wheezing.    Cardiovascular:  Negative for chest pain, palpitations and leg swelling.   Allergic/Immunologic: Negative.      Current Outpatient Medications on File Prior to Visit   Medication Sig Dispense Refill    alendronate (FOSAMAX) 70 mg tablet Take 1 tablet by mouth once a week 4 tablet 0    azelastine (ASTELIN) 0.1 % nasal spray 1 spray into each nostril 2 (two) times a day Use in each nostril as directed 1 mL 3    cholecalciferol (VITAMIN D3) 1,000 units tablet Take 1,000 Units by mouth daily      irbesartan (AVAPRO) 150 mg tablet Take 1 tablet by mouth once daily 100 tablet 0    metoprolol succinate (TOPROL-XL) 25 mg 24 hr tablet Take 1/2 (one-half) tablet by mouth once daily 45 tablet 1    multivitamin (THERAGRAN) TABS Take 1 tablet by mouth daily      Omega 3 1000 MG CAPS Take 1,000 mg by mouth 2 (two) times a day      simvastatin (ZOCOR) 40 mg tablet Take 1 tablet by mouth once daily 30 tablet 5    traZODone (DESYREL) 50 mg tablet TAKE 1 TABLET BY MOUTH ONCE DAILY AT BEDTIME AS NEEDED FOR SLEEP 30 tablet 5    albuterol (Ventolin HFA) 90 mcg/act inhaler Inhale 2 puffs every 4 (four) hours as needed for wheezing or shortness of breath 18 g 3    buPROPion (WELLBUTRIN XL) 150 mg 24 hr tablet Take 1 tablet by mouth once daily (Patient not taking: Reported on 2/11/2025) 90 tablet 0    ipratropium (ATROVENT) 0.06 % nasal spray 2 sprays into each nostril 2 (two) times a day 15 mL 11    [DISCONTINUED] budesonide-formoterol (Symbicort) 80-4.5 MCG/ACT  inhaler Inhale 2 puffs 2 (two) times a day Rinse mouth after use. 10.2 g 1     No current facility-administered medications on file prior to visit.         Historical Information   Tobacco History: Never  Occupational History: Retired, worked for [employer]    Objective   There were no vitals taken for this visit.     Physical Exam  Vitals and nursing note reviewed.   Constitutional:       General: She is not in acute distress.     Appearance: Normal appearance. She is well-developed.   Cardiovascular:      Rate and Rhythm: Normal rate and regular rhythm.      Heart sounds: Normal heart sounds, S1 normal and S2 normal. No murmur heard.  Pulmonary:      Effort: Pulmonary effort is normal.      Breath sounds: Examination of the right-lower field reveals rales. Examination of the left-lower field reveals rales. Rales present. No decreased breath sounds, wheezing or rhonchi.   Musculoskeletal:         General: No swelling.      Right lower leg: No edema.      Left lower leg: No edema.   Neurological:      Mental Status: She is alert.   Psychiatric:         Mood and Affect: Mood and affect normal.         Behavior: Behavior normal. Behavior is cooperative.         Lab Results: I have reviewed pertinent labs.    Radiology Results Review: I have reviewed radiology reports from 11/12/2024 including: CT chest.  Other Study Results: Other Study Results Review : No additional pertinent studies reviewed.  PFT Results Reviewed: reviewed

## 2025-02-12 ENCOUNTER — RA CDI HCC (OUTPATIENT)
Dept: OTHER | Facility: HOSPITAL | Age: 80
End: 2025-02-12

## 2025-02-22 DIAGNOSIS — M81.0 OSTEOPOROSIS, UNSPECIFIED OSTEOPOROSIS TYPE, UNSPECIFIED PATHOLOGICAL FRACTURE PRESENCE: ICD-10-CM

## 2025-02-24 RX ORDER — ALENDRONATE SODIUM 70 MG/1
70 TABLET ORAL WEEKLY
Qty: 4 TABLET | Refills: 0 | Status: SHIPPED | OUTPATIENT
Start: 2025-02-24

## 2025-02-24 NOTE — PROGRESS NOTES
Pulmonary Rehabilitation   Assessment and Individualized Treatment Plan  INITIAL       Today's date: 2025  # of Exercise Sessions Completed: Initial Visit  Patient name: Elza Kaplan     : 1945       MRN: [id number]  Referring Physician: Dr. Jhony Greenwood MD  Pulmonologist: Dr. Jenae Soriano MD >Dr. Jhony Greenwood MD  Patient switching providers due to change in campus location  Provider: [place]  Clinician: Jihan Traylor, MPT,CCRP    Dx:  Interstitial [place] Disease  Date of onset: 2025      Treatment is tailored to this patient's individual needs.  The ITP was reviewed with the patient and all questions were answered to their satisfaction.  Additional ITP documentation can be found electronically including daily and monthly exercise summaries, daily session notes with summaries, education notes, daily medication reconciliation, and daily physician supervision.      INITIAL EVALUATION SUMMARY 2025    Patient's subjective report of progress/symptoms: Patient notes moderate to great BORRERO that has been worsening over the past year. She expresses much frustration w/her limited functional ability.   Home exercise/ADLs: Able to perform ADLs w/frequent rest periods. She currently does not perform a HEP.     Initial Fitness Assessment: 6 Minute Walk Test on RA  Resting Vitals: HR 73, /64, 02 sat 99%, SOB 4/10  Peak Vitals: HR 92, /74, 02 sat 97%, SOB 6/10, RPE 6/10  Achieved 422 feet, a 1.61 MET Level  Recovery Vitals: HR 68, /64, 02 Sat 98%, SOB 4/10    Patient plans to attend 2X weekly. Currently she has a 30% co-insurance. She is going to wait until a bill has been issued for this visit. Once she knows what the 30% cost will be, she will call the Pulm Rehab department to make appointments w/in her budget.     ASSESSMENT      Medical History:   Past Medical History:   Diagnosis Date    Allergic     Allergic rhinitis     Anxiety the passed 2 yrs    Arthritis      Cataract About 5 years ago    Colon polyp     Depression     Diabetes (HCC)     Diabetes mellitus (HCC)     Hernia 2006    Rightside    HL (hearing loss)     Hx of benign breast biopsy 10/31/2018    Left breast mammogram and ultrasound 10/10/2018.    Hyperlipidemia     Hypertension     Obesity     Osteoporosis the passed 8 yrs    Visual impairment        Family History:  Family History   Problem Relation Age of Onset    Heart failure Mother     Arthritis Mother     Hearing loss Mother     Heart disease Mother     Hypertension Mother     Hypothyroidism Mother     Diabetes Father     Alcohol abuse Father     Dementia Father     Stomach cancer Maternal Grandmother     No Known Problems Maternal Grandfather     No Known Problems Paternal Grandmother     No Known Problems Paternal Grandfather     No Known Problems Maternal Aunt     No Known Problems Maternal Aunt     No Known Problems Paternal Aunt     No Known Problems Paternal Aunt     No Known Problems Paternal Aunt     No Known Problems Paternal Aunt        Allergies:   Metformin    Current Medications:   Current Outpatient Medications   Medication Sig Dispense Refill    albuterol (Ventolin HFA) 90 mcg/act inhaler Inhale 2 puffs every 4 (four) hours as needed for wheezing or shortness of breath 18 g 3    alendronate (FOSAMAX) 70 mg tablet Take 1 tablet by mouth once a week 4 tablet 0    azelastine (ASTELIN) 0.1 % nasal spray 1 spray into each nostril 2 (two) times a day Use in each nostril as directed 1 mL 3    benzonatate (TESSALON PERLES) 100 mg capsule Take 1 capsule (100 mg total) by mouth 3 (three) times a day as needed for cough 20 capsule 1    buPROPion (WELLBUTRIN XL) 150 mg 24 hr tablet Take 1 tablet by mouth once daily (Patient not taking: Reported on 2/11/2025) 90 tablet 0    cholecalciferol (VITAMIN D3) 1,000 units tablet Take 1,000 Units by mouth daily      ipratropium (ATROVENT) 0.06 % nasal spray 2 sprays into each nostril 2 (two) times a day 15 mL  11    irbesartan (AVAPRO) 150 mg tablet Take 1 tablet by mouth once daily 100 tablet 0    metoprolol succinate (TOPROL-XL) 25 mg 24 hr tablet Take 1/2 (one-half) tablet by mouth once daily 45 tablet 1    multivitamin (THERAGRAN) TABS Take 1 tablet by mouth daily      Omega 3 1000 MG CAPS Take 1,000 mg by mouth 2 (two) times a day      simvastatin (ZOCOR) 40 mg tablet Take 1 tablet by mouth once daily 30 tablet 5    traZODone (DESYREL) 50 mg tablet TAKE 1 TABLET BY MOUTH ONCE DAILY AT BEDTIME AS NEEDED FOR SLEEP 30 tablet 5     No current facility-administered medications for this visit.       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 She states at one time she was ill due to this type of exposure and had lost > 20#.     Sleep Disorders:   NA      EXERCISE ASSESSMENT:      Initial Fitness Assessment: 6 Minute Walk Test on RA  Resting Vitals: HR 73, /64, 02 sat 99%, SOB 4/10  Peak Vitals: HR 92, /74, 02 sat 97%, SOB 6/10, RPE 6/10  Achieved 422 feet, a 1.61 MET Level  Recovery Vitals: HR 68, /64, 02 Sat 98%, SOB 4/10        Current Functional Status:  Occupation: retired  Recreation/Physical activity: Sedentary at this time due to SOB, weakness and fatigue  ADL’s:able to perform self-care resumed driving  Exchange: Capable of performing light ADLs only  Exercise:  none  Home exercise equipment: NA  Other Comments:     SMART Exercise Goals:   reduced score on CAT, improved 6MWT distance, reduced dyspnea during exercise,  "improved exercise tolerance based on peak METs tolerated in pulmonary rehab exercise session, and SpO2 >88% during exercise    Patient Specific EXERCISE GOALS:     attend pulmonary rehab regularly, decrease sitting time at home, start a walking program, increased muscular strength, increased energy, increased stamina with ADLs, and more independence at home    PSYCHOSOCIAL ASSESSMENT:    Date of last assessment: 2/25/2025  Depression screening:  PHQ-9 = 8    Interpretation:  5-9 = Mild Depression  Anxiety screening:  NICK-7 = 6    Interpretation: 5-9 = Mild anxiety    Pt self-report of depression and anxiety   Patient reports they are coping well with good social support and denies depression or anxiety    Self-reported stress level:  7/10  Stress Management: read, exercise, spend time outside, and spend time with family    Quality of Life Screen:  (Higher score indicates disease impact on QOL)  [facility] COOP score: 22/40      CAT: 27/40      Shortness of breath questionnaire: 63/120    Social Support:   children, son Peyman  Community/Social Activities: None at this time, very sedentary due to SOB, weakness and fatigue    SMART Goals:    Reduce perceived stress to 1-3/10, Physical Fitness in [facility] Score < 3, Daily Activity in [facility] Score < 3, Overall Health in [facility] Score < 3, feel less tired with more energy, and Improved appetite control    Patient Specific PSYCHOCOSOCIAL GOALS:    maintain compliance with medical therapy, address emotional health concerns with PCP, and spend time with family     Psychosocial Assessment as it relates to rehabilitation: Patient denies issues with his/her family or home life that may affect their rehabilitation efforts.       NUTRITION ASSESSMENT:    Initial Weight:  128  Current Weight: 128    Height:   Ht Readings from Last 1 Encounters:   02/11/25 4' 11\" (1.499 m)     12/19/2024: A1C 6.3 and   12/19/2024: Lipid Panel Chol 110, Tri 146, HDL 54 and LDL 27    Rate " Your Plate Score: 66/81    Self-reported Current Dietary Habits:  Follows a low sodium diet due to renal disease.    ETOH:   Social History     Substance and Sexual Activity   Alcohol Use No       SMART Goals:   fasting BG , eat 6 or more servings of grain products per day, eat whole grain breads, brown rice and whole grain cereals, eat 5 or more servings of fruits and vegetables a day, and rarely eat processed meats or eat low fat processed meats    Patient Specific NUTRITION GOALS:    increase water intake to reduce/thin mucus production improve hydration increased muscle mass      OTHER CORE COMPONENT ASSESSMENT:    Hospitalizations in the past year: none    Oxygen needs:   Rest:  room air  Exercise/physical activity:  room air  Sleep:   room air    Does Pt monitor home SpO2? no   Average SpO2 at rest:  NA%   Average SpO2 with ADLs/physical activity:  NA%      Use of Rescue Inhaler: NA    Use of Maintenance Inhaler: NA    Use of Nebulizer Treatments:  NA    Patient practices breathing techniques at home:  No    CAD Risk Factors:  Cholesterol: Yes  HTN: Yes  DM: Type 2   Obesity: No     Smoking: Never used    SMART Core Component Goals:   consistent, controlled resting BP < 130/80, medication compliance, and demonstrate pursed lipped breathing    Patient Specific CORE COMPONENT GOALS:    reduced dietary sodium <2000mg, medication compliance, and avoid second hand smoke/other respiratory irritants      INDIVIDUALIZED TREATMENT PLAN      EXERCISE GOALS AND PLAN    PHYSCIAN PRESCRIBED EXERCISE    Current Aerobic Exercise Prescription       Frequency: 2-3 days/week   Supplement with home exercise 2+ days/wk as tolerated        Minutes: 30-40         METS: 2-3.50              SpO2: >88% on RA              RPD: 4-6/10                      HR: RHR +30-40bpm   RPE: 4-5/10         Modalities: Treadmill, UBE, NuStep, and Recumbent bike      Aerobic Exercise Prescription Plan for Progression:    Frequency: 2-3  days/week    Supplement with home exercise 2+ days/wk as tolerated       Minutes: 40-45        METS: 3.40-4.0              SpO2: >88% on RA              RPD: 3-4/10                      HR:RHR +30-40bpm   RPE: 4-5/10        Modalities: Treadmill, UBE, NuStep, and Recumbent bike        Supplemental Oxygen Needs with Exercise:  room air and will titrate O2 to maintain SpO2 >88%.    Strength trainin-3 days / week  12-15 repetitions  1-2 sets per modality    Modalities:  UE free weights    Education: benefits of exercise for pulmonary disease, RPE scale, RPD scale, O2 saturation monitoring, and appropriate O2 response to exercise    Progress toward Exercise Goals:    Reviewed Pt goals and determined plan of care, Will continue to educate and progress as tolerated.    Exercise Plan:  reduce time sitting at home, utilize PLB with physical activity, and begin a home exercise program     Readiness to change: Preparation:  (Getting ready to change)       NUTRITION GOALS AND PLAN    Progress toward Nutritional goals:    Reviewed Pt goals and determined plan of care, Will continue to educate and progress as tolerated.    Nutrition Plan: increase intake of whole grains, increase daily intake of fruits and vegetables, choose lean red meat, cook without fats or oils, never/rarely eat fried foods, rarely/never eat salty snacks, and choose low sugar desserts and sweets    SMART goals are based Rate Your Plate Dietary Self-Assessment. These are the areas in which the patient could score higher on the assessment.  Goals include recommendations for a heart healthy diet based on American Heart Association.    Nutrition Education: low sodium diet  maintaining hydration  group class:  Label Reading    Readiness to change: Preparation:  (Getting ready to change)       PSYCHOSOCIAL GOALS AND PLAN    Information to begin using [employer] was provided as well as contact information for counseling through  [facility].    Progress toward Psychosocial goals:    Reviewed Pt goals and determined plan of care, Will continue to educate and progress as tolerated.    Psychosocial Plan: Class:  Stress and Pulmonary Disease, Practice relaxation techniques, and Exercise    Psychosocial Education: class:  Stress and Pulmonary Disease    Readiness to change: Preparation:  (Getting ready to change)       OTHER CORE COMPONENTS GOALS AND PLAN    Tobacco Use Intervention:     N/A:  Patient is a non-smoker     Oxygen Goal: Maintain SpO2>88% during exercise or as advised by pulmonolgist    Progress toward Core Component goals:    Reviewed Pt goals and determined plan of care, Will continue to educate and progress as tolerated.    Core Component Plan: medication compliance, avoid places with second hand smoke, avoid processed foods, and engage in regular exercise    Core Component Education:  pathophysiology of pulmonary disease    Readiness to change: Preparation:  (Getting ready to change)

## 2025-02-25 ENCOUNTER — CLINICAL SUPPORT (OUTPATIENT)
Dept: PULMONOLOGY | Facility: HOSPITAL | Age: 80
End: 2025-02-25
Payer: COMMERCIAL

## 2025-02-25 DIAGNOSIS — R06.09 DYSPNEA ON EXERTION: ICD-10-CM

## 2025-02-25 DIAGNOSIS — J84.9 ILD (INTERSTITIAL LUNG DISEASE) (HCC): Primary | ICD-10-CM

## 2025-02-25 PROCEDURE — G0239 OTH RESP PROC, GROUP: HCPCS

## 2025-03-03 ENCOUNTER — OFFICE VISIT (OUTPATIENT)
Dept: FAMILY MEDICINE CLINIC | Facility: CLINIC | Age: 80
End: 2025-03-03
Payer: COMMERCIAL

## 2025-03-03 VITALS
BODY MASS INDEX: 24.8 KG/M2 | OXYGEN SATURATION: 98 % | DIASTOLIC BLOOD PRESSURE: 64 MMHG | HEART RATE: 74 BPM | HEIGHT: 59 IN | WEIGHT: 123 LBS | SYSTOLIC BLOOD PRESSURE: 128 MMHG

## 2025-03-03 DIAGNOSIS — Z00.00 MEDICARE ANNUAL WELLNESS VISIT, SUBSEQUENT: Primary | ICD-10-CM

## 2025-03-03 DIAGNOSIS — J84.9 ILD (INTERSTITIAL LUNG DISEASE) (HCC): ICD-10-CM

## 2025-03-03 DIAGNOSIS — N18.31 CHRONIC KIDNEY DISEASE, STAGE 3A (HCC): ICD-10-CM

## 2025-03-03 PROCEDURE — G0439 PPPS, SUBSEQ VISIT: HCPCS | Performed by: STUDENT IN AN ORGANIZED HEALTH CARE EDUCATION/TRAINING PROGRAM

## 2025-03-03 RX ORDER — GUAIFENESIN 600 MG/1
600 TABLET, EXTENDED RELEASE ORAL EVERY 12 HOURS SCHEDULED
Qty: 30 TABLET | Refills: 0 | Status: SHIPPED | OUTPATIENT
Start: 2025-03-03

## 2025-03-03 NOTE — ASSESSMENT & PLAN NOTE
She continues to complain of subjective shortness of breath with no objective hypoxia on vitals  Follows with pulmonology was diagnosed with ILD with unclear etiology  She was recommended a pulmonary rehab which she has not yet done  Discussed with patient the prognosis of ILD and unfortunately from my perspective there is not much I can offer that she should continue with her follow-up with pulmonology    Orders:    guaiFENesin (Mucinex) 600 mg 12 hr tablet; Take 1 tablet (600 mg total) by mouth every 12 (twelve) hours  Depression Screening Follow-up Plan: Patient's depression screening was positive with a PHQ-9 score of 7. Patient with underlying depression and was advised to continue current medications as prescribed.

## 2025-03-03 NOTE — PATIENT INSTRUCTIONS
Medicare Preventive Visit Patient Instructions  Thank you for completing your Welcome to Medicare Visit or Medicare Annual Wellness Visit today. Your next wellness visit will be due in one year (3/4/2026).  The screening/preventive services that you may require over the next 5-10 years are detailed below. Some tests may not apply to you based off risk factors and/or age. Screening tests ordered at today's visit but not completed yet may show as past due. Also, please note that scanned in results may not display below.  Preventive Screenings:  Service Recommendations Previous Testing/Comments   Colorectal Cancer Screening  * Colonoscopy    * Fecal Occult Blood Test (FOBT)/Fecal Immunochemical Test (FIT)  * Fecal DNA/Cologuard Test  * Flexible Sigmoidoscopy Age: 45-75 years old   Colonoscopy: every 10 years (may be performed more frequently if at higher risk)  OR  FOBT/FIT: every 1 year  OR  Cologuard: every 3 years  OR  Sigmoidoscopy: every 5 years  Screening may be recommended earlier than age 45 if at higher risk for colorectal cancer. Also, an individualized decision between you and your healthcare provider will decide whether screening between the ages of 76-85 would be appropriate. Colonoscopy: 06/27/2022  FOBT/FIT: Not on file  Cologuard: Not on file  Sigmoidoscopy: Not on file          Breast Cancer Screening Age: 40+ years old  Frequency: every 1-2 years  Not required if history of left and right mastectomy Mammogram: 11/13/2024    Screening Current   Cervical Cancer Screening Between the ages of 21-29, pap smear recommended once every 3 years.   Between the ages of 30-65, can perform pap smear with HPV co-testing every 5 years.   Recommendations may differ for women with a history of total hysterectomy, cervical cancer, or abnormal pap smears in past. Pap Smear: Not on file    Screening Not Indicated   Hepatitis C Screening Once for adults born between 1945 and 1965  More frequently in patients at high risk  for Hepatitis C Hep C Antibody: 08/27/2019    Screening Current   Diabetes Screening 1-2 times per year if you're at risk for diabetes or have pre-diabetes Fasting glucose: 122 mg/dL (8/3/2023)  A1C: 6.3 % (12/19/2024)  Screening Current   Cholesterol Screening Once every 5 years if you don't have a lipid disorder. May order more often based on risk factors. Lipid panel: 12/19/2024    Screening Not Indicated  History Lipid Disorder     Other Preventive Screenings Covered by Medicare:  Abdominal Aortic Aneurysm (AAA) Screening: covered once if your at risk. You're considered to be at risk if you have a family history of AAA.  Lung Cancer Screening: covers low dose CT scan once per year if you meet all of the following conditions: (1) Age 55-77; (2) No signs or symptoms of lung cancer; (3) Current smoker or have quit smoking within the last 15 years; (4) You have a tobacco smoking history of at least 20 pack years (packs per day multiplied by number of years you smoked); (5) You get a written order from a healthcare provider.  Glaucoma Screening: covered annually if you're considered high risk: (1) You have diabetes OR (2) Family history of glaucoma OR (3)  aged 50 and older OR (4)  American aged 65 and older  Osteoporosis Screening: covered every 2 years if you meet one of the following conditions: (1) You're estrogen deficient and at risk for osteoporosis based off medical history and other findings; (2) Have a vertebral abnormality; (3) On glucocorticoid therapy for more than 3 months; (4) Have primary hyperparathyroidism; (5) On osteoporosis medications and need to assess response to drug therapy.   Last bone density test (DXA Scan): 02/21/2024.  HIV Screening: covered annually if you're between the age of 15-65. Also covered annually if you are younger than 15 and older than 65 with risk factors for HIV infection. For pregnant patients, it is covered up to 3 times per  pregnancy.    Immunizations:  Immunization Recommendations   Influenza Vaccine Annual influenza vaccination during flu season is recommended for all persons aged >= 6 months who do not have contraindications   Pneumococcal Vaccine   * Pneumococcal conjugate vaccine = PCV13 (Prevnar 13), PCV15 (Vaxneuvance), PCV20 (Prevnar 20)  * Pneumococcal polysaccharide vaccine = PPSV23 (Pneumovax) Adults 19-65 yo with certain risk factors or if 65+ yo  If never received any pneumonia vaccine: recommend Prevnar 20 (PCV20)  Give PCV20 if previously received 1 dose of PCV13 or PPSV23   Hepatitis B Vaccine 3 dose series if at intermediate or high risk (ex: diabetes, end stage renal disease, liver disease)   Respiratory syncytial virus (RSV) Vaccine - COVERED BY MEDICARE PART D  * RSVPreF3 (Arexvy) CDC recommends that adults 60 years of age and older may receive a single dose of RSV vaccine using shared clinical decision-making (SCDM)   Tetanus (Td) Vaccine - COST NOT COVERED BY MEDICARE PART B Following completion of primary series, a booster dose should be given every 10 years to maintain immunity against tetanus. Td may also be given as tetanus wound prophylaxis.   Tdap Vaccine - COST NOT COVERED BY MEDICARE PART B Recommended at least once for all adults. For pregnant patients, recommended with each pregnancy.   Shingles Vaccine (Shingrix) - COST NOT COVERED BY MEDICARE PART B  2 shot series recommended in those 19 years and older who have or will have weakened immune systems or those 50 years and older     Health Maintenance Due:      Topic Date Due   • Breast Cancer Screening: Mammogram  11/13/2026   • Hepatitis C Screening  Completed   • Colorectal Cancer Screening  Discontinued     Immunizations Due:  There are no preventive care reminders to display for this patient.  Advance Directives   What are advance directives?  Advance directives are legal documents that state your wishes and plans for medical care. These plans are  made ahead of time in case you lose your ability to make decisions for yourself. Advance directives can apply to any medical decision, such as the treatments you want, and if you want to donate organs.   What are the types of advance directives?  There are many types of advance directives, and each state has rules about how to use them. You may choose a combination of any of the following:  Living will:  This is a written record of the treatment you want. You can also choose which treatments you do not want, which to limit, and which to stop at a certain time. This includes surgery, medicine, IV fluid, and tube feedings.   Durable power of  for healthcare (DPAHC):  This is a written record that states who you want to make healthcare choices for you when you are unable to make them for yourself. This person, called a proxy, is usually a family member or a friend. You may choose more than 1 proxy.  Do not resuscitate (DNR) order:  A DNR order is used in case your heart stops beating or you stop breathing. It is a request not to have certain forms of treatment, such as CPR. A DNR order may be included in other types of advance directives.  Medical directive:  This covers the care that you want if you are in a coma, near death, or unable to make decisions for yourself. You can list the treatments you want for each condition. Treatment may include pain medicine, surgery, blood transfusions, dialysis, IV or tube feedings, and a ventilator (breathing machine).  Values history:  This document has questions about your views, beliefs, and how you feel and think about life. This information can help others choose the care that you would choose.  Why are advance directives important?  An advance directive helps you control your care. Although spoken wishes may be used, it is better to have your wishes written down. Spoken wishes can be misunderstood, or not followed. Treatments may be given even if you do not want them.  An advance directive may make it easier for your family to make difficult choices about your care.       © Copyright [employer] 2018 Information is for End User's use only and may not be sold, redistributed or otherwise used for commercial purposes. All illustrations and images included in CareNotes® are the copyrighted property of [employer] Inc[employer] or [employer]

## 2025-03-03 NOTE — PROGRESS NOTES
Name: Elza Kaplan      : 1945      MRN: [id number]  Encounter Provider: Trey Pérez MD  Encounter Date: 3/3/2025   Encounter department: [facility] PRIMARY CARE    Assessment & Plan  Medicare annual wellness visit, subsequent         Chronic kidney disease, stage 3a (HCC)  Lab Results   Component Value Date    EGFR 60 2024    EGFR 65 2023    EGFR 55 2023    CREATININE 0.91 2024    CREATININE 0.85 2023    CREATININE 0.98 2023     Chronic stable   Avoid nephrotoxins  Continue with routine lab s         ILD (interstitial lung disease) (HCC)  She continues to complain of subjective shortness of breath with no objective hypoxia on vitals  Follows with pulmonology was diagnosed with ILD with unclear etiology  She was recommended a pulmonary rehab which she has not yet done  Discussed with patient the prognosis of ILD and unfortunately from my perspective there is not much I can offer that she should continue with her follow-up with pulmonology    Orders:    guaiFENesin (Mucinex) 600 mg 12 hr tablet; Take 1 tablet (600 mg total) by mouth every 12 (twelve) hours  Depression Screening Follow-up Plan: Patient's depression screening was positive with a PHQ-9 score of 7. Patient with underlying depression and was advised to continue current medications as prescribed.           Preventive health issues were discussed with patient, and age appropriate screening tests were ordered as noted in patient's After Visit Summary. Personalized health advice and appropriate referrals for health education or preventive services given if needed, as noted in patient's After Visit Summary.    History of Present Illness     HPI   Patient Care Team:  Trey Pérez MD as PCP - General (Family Medicine)  DO Chani Austin DO (Gastroenterology)  JENI Bonilla as Nurse Practitioner (Pulmonary Disease)    Review of Systems    Constitutional:  Negative for activity change, appetite change, chills, fatigue and fever.   HENT:  Negative for congestion, dental problem, drooling, ear discharge, ear pain, facial swelling, postnasal drip, rhinorrhea and sinus pain.    Eyes:  Negative for photophobia, pain, discharge and itching.   Respiratory:  Positive for shortness of breath. Negative for apnea, cough and chest tightness.    Cardiovascular:  Negative for chest pain and leg swelling.   Gastrointestinal:  Negative for abdominal distention, abdominal pain, anal bleeding, constipation, diarrhea and nausea.   Endocrine: Negative for cold intolerance, heat intolerance and polydipsia.   Genitourinary:  Negative for difficulty urinating.   Musculoskeletal:  Negative for arthralgias, gait problem, joint swelling and myalgias.   Skin:  Negative for color change and pallor.   Allergic/Immunologic: Negative for immunocompromised state.   Neurological:  Negative for dizziness, seizures, facial asymmetry, weakness, light-headedness, numbness and headaches.   Psychiatric/Behavioral:  Negative for agitation, behavioral problems, confusion, decreased concentration and dysphoric mood.    All other systems reviewed and are negative.    Medical History Reviewed by provider this encounter:       Annual Wellness Visit Questionnaire   Elza is here for her Subsequent Wellness visit.     Health Risk Assessment:   Patient rates overall health as fair. Patient feels that their physical health rating is slightly worse. Patient is satisfied with their life. Eyesight was rated as same. Hearing was rated as slightly worse. Patient feels that their emotional and mental health rating is same. Patients states they are never, rarely angry. Patient states they are sometimes unusually tired/fatigued. Pain experienced in the last 7 days has been some. Patient's pain rating has been 2/10. Patient states that she has experienced no weight loss or gain in last 6 months.      Depression Screening:   PHQ-9 Score: 7      Fall Risk Screening:   In the past year, patient has experienced: no history of falling in past year      Urinary Incontinence Screening:   Patient has not leaked urine accidently in the last six months.     Home Safety:  Patient does not have trouble with stairs inside or outside of their home. Patient has working smoke alarms and has working carbon monoxide detector. Home safety hazards include: none.     Nutrition:   Current diet is Regular.     Medications:   Patient is currently taking over-the-counter supplements. OTC medications include: see medication list. Patient is able to manage medications.     Activities of Daily Living (ADLs)/Instrumental Activities of Daily Living (IADLs):   Walk and transfer into and out of bed and chair?: Yes  Dress and groom yourself?: Yes    Bathe or shower yourself?: Yes    Feed yourself? Yes  Do your laundry/housekeeping?: Yes  Manage your money, pay your bills and track your expenses?: Yes  Make your own meals?: Yes    Do your own shopping?: Yes    Durable Medical Equipment Suppliers  none    Previous Hospitalizations:   Any hospitalizations or ED visits within the last 12 months?: Yes    How many hospitalizations have you had in the last year?: 1-2    Advance Care Planning:   Living will: Yes    Durable POA for healthcare: Yes    Advanced directive: Yes    Advanced directive counseling given: Yes    ACP document given: Yes    End of Life Decisions reviewed with patient: Yes      PREVENTIVE SCREENINGS      Cardiovascular Screening:    General: Screening Not Indicated and History Lipid Disorder      Diabetes Screening:     General: Screening Current      Breast Cancer Screening:     General: Screening Current      Cervical Cancer Screening:    General: Screening Not Indicated      Osteoporosis Screening:    General: Screening Not Indicated and History Osteoporosis      Lung Cancer Screening:     General: Screening Not  "Indicated      Hepatitis C Screening:    General: Screening Current    Screening, Brief Intervention, and Referral to Treatment (SBIRT)     Screening  Typical number of drinks in a day: 0  Typical number of drinks in a week: 0  Interpretation: Low risk drinking behavior.    Single Item Drug Screening:  How often have you used an illegal drug (including marijuana) or a prescription medication for non-medical reasons in the past year? never    Single Item Drug Screen Score: 0  Interpretation: Negative screen for possible drug use disorder    Social Drivers of Health     Financial Resource Strain: Low Risk  (2/5/2024)    Overall Financial Resource Strain (CARDIA)     Difficulty of Paying Living Expenses: Not hard at all   Food Insecurity: No Food Insecurity (2/27/2025)    Hunger Vital Sign     Worried About Running Out of Food in the Last Year: Never true     Ran Out of Food in the Last Year: Never true   Transportation Needs: No Transportation Needs (2/27/2025)    PRAPARE - Transportation     Lack of Transportation (Medical): No     Lack of Transportation (Non-Medical): No   Housing Stability: Low Risk  (2/27/2025)    Housing Stability Vital Sign     Unable to Pay for Housing in the Last Year: No     Number of Times Moved in the Last Year: 0     Homeless in the Last Year: No   Utilities: Not At Risk (2/27/2025)    [facility] Utilities     Threatened with loss of utilities: No     No results found.    Objective   /64 (BP Location: Left arm, Patient Position: Sitting, Cuff Size: Adult)   Pulse 74   Ht 4' 11\" (1.499 m)   Wt 55.8 kg (123 lb)   SpO2 98%   BMI 24.84 kg/m²     Physical Exam  Vitals and nursing note reviewed.   Constitutional:       General: She is not in acute distress.     Appearance: She is well-developed.   HENT:      Head: Normocephalic and atraumatic.   Eyes:      Conjunctiva/sclera: Conjunctivae normal.      Pupils: Pupils are equal, round, and reactive to light.   Cardiovascular:      Rate and " Rhythm: Normal rate and regular rhythm.      Heart sounds: Normal heart sounds. No murmur heard.     No friction rub.   Pulmonary:      Breath sounds: Normal breath sounds.   Abdominal:      General: Bowel sounds are normal.      Palpations: Abdomen is soft.   Musculoskeletal:         General: Normal range of motion.      Cervical back: Normal range of motion and neck supple.   Skin:     General: Skin is warm.      Capillary Refill: Capillary refill takes less than 2 seconds.   Neurological:      Mental Status: She is alert and oriented to person, place, and time.      Motor: No abnormal muscle tone.      Coordination: Coordination normal.   Psychiatric:         Behavior: Behavior normal.         Thought Content: Thought content normal.

## 2025-03-10 ENCOUNTER — HOSPITAL ENCOUNTER (OUTPATIENT)
Dept: PULMONOLOGY | Facility: HOSPITAL | Age: 80
Discharge: HOME | End: 2025-03-10
Payer: COMMERCIAL

## 2025-03-10 ENCOUNTER — TELEPHONE (OUTPATIENT)
Age: 80
End: 2025-03-10

## 2025-03-10 DIAGNOSIS — R06.09 DYSPNEA ON EXERTION: ICD-10-CM

## 2025-03-10 DIAGNOSIS — J84.9 ILD (INTERSTITIAL LUNG DISEASE) (HCC): ICD-10-CM

## 2025-03-10 PROCEDURE — 94729 DIFFUSING CAPACITY: CPT

## 2025-03-10 PROCEDURE — 94618 PULMONARY STRESS TESTING: CPT | Performed by: INTERNAL MEDICINE

## 2025-03-10 PROCEDURE — 94726 PLETHYSMOGRAPHY LUNG VOLUMES: CPT | Performed by: INTERNAL MEDICINE

## 2025-03-10 PROCEDURE — 94618 PULMONARY STRESS TESTING: CPT

## 2025-03-10 PROCEDURE — 94729 DIFFUSING CAPACITY: CPT | Performed by: INTERNAL MEDICINE

## 2025-03-10 PROCEDURE — 94060 EVALUATION OF WHEEZING: CPT

## 2025-03-10 PROCEDURE — 94726 PLETHYSMOGRAPHY LUNG VOLUMES: CPT

## 2025-03-10 PROCEDURE — 94060 EVALUATION OF WHEEZING: CPT | Performed by: INTERNAL MEDICINE

## 2025-03-10 RX ORDER — ALBUTEROL SULFATE 0.83 MG/ML
2.5 SOLUTION RESPIRATORY (INHALATION) ONCE
Status: COMPLETED | OUTPATIENT
Start: 2025-03-10 | End: 2025-03-10

## 2025-03-10 RX ADMIN — ALBUTEROL SULFATE 2.5 MG: 2.5 SOLUTION RESPIRATORY (INHALATION) at 11:00

## 2025-03-11 ENCOUNTER — APPOINTMENT (OUTPATIENT)
Dept: URBAN - NONMETROPOLITAN AREA CLINIC 4 | Facility: CLINIC | Age: 80
Setting detail: DERMATOLOGY
End: 2025-03-11

## 2025-03-11 DIAGNOSIS — L82.0 INFLAMED SEBORRHEIC KERATOSIS: ICD-10-CM

## 2025-03-11 DIAGNOSIS — L85.3 XEROSIS CUTIS: ICD-10-CM

## 2025-03-11 DIAGNOSIS — L57.0 ACTINIC KERATOSIS: ICD-10-CM

## 2025-03-11 PROCEDURE — ? COUNSELING

## 2025-03-11 PROCEDURE — ? LIQUID NITROGEN

## 2025-03-11 PROCEDURE — 99212 OFFICE O/P EST SF 10 MIN: CPT | Mod: 25

## 2025-03-11 PROCEDURE — 17110 DESTRUCTION B9 LES UP TO 14: CPT

## 2025-03-11 PROCEDURE — 17000 DESTRUCT PREMALG LESION: CPT | Mod: 59

## 2025-03-11 PROCEDURE — ? TREATMENT REGIMEN

## 2025-03-11 ASSESSMENT — LOCATION SIMPLE DESCRIPTION DERM
LOCATION SIMPLE: RIGHT TEMPLE
LOCATION SIMPLE: RIGHT LOWER BACK
LOCATION SIMPLE: LEFT LOWER BACK
LOCATION SIMPLE: LEFT UPPER BACK
LOCATION SIMPLE: UPPER BACK
LOCATION SIMPLE: RIGHT UPPER BACK

## 2025-03-11 ASSESSMENT — LOCATION DETAILED DESCRIPTION DERM
LOCATION DETAILED: RIGHT INFERIOR MEDIAL UPPER BACK
LOCATION DETAILED: RIGHT SUPERIOR LATERAL MIDBACK
LOCATION DETAILED: LEFT INFERIOR MEDIAL UPPER BACK
LOCATION DETAILED: LEFT MID-UPPER BACK
LOCATION DETAILED: LEFT INFERIOR UPPER BACK
LOCATION DETAILED: RIGHT CENTRAL TEMPLE
LOCATION DETAILED: RIGHT MID-UPPER BACK
LOCATION DETAILED: INFERIOR THORACIC SPINE
LOCATION DETAILED: LEFT SUPERIOR MEDIAL MIDBACK

## 2025-03-11 ASSESSMENT — PAIN INTENSITY VAS: HOW INTENSE IS YOUR PAIN 0 BEING NO PAIN, 10 BEING THE MOST SEVERE PAIN POSSIBLE?: 1/10 PAIN

## 2025-03-11 ASSESSMENT — LOCATION ZONE DERM
LOCATION ZONE: FACE
LOCATION ZONE: TRUNK

## 2025-03-11 ASSESSMENT — TOTAL NUMBER OF LESIONS: # OF LESIONS?: 1

## 2025-03-11 NOTE — PROCEDURE: LIQUID NITROGEN
Show Topical Anesthesia Variable?: Yes
Consent: The patient's consent was obtained including but not limited to risks of crusting, scabbing, blistering, scarring, darker or lighter pigmentary change, recurrence, incomplete removal and infection.
Application Tool (Optional): Cry-AC
Number Of Freeze-Thaw Cycles: 3 freeze-thaw cycles
Render Post-Care Instructions In Note?: no
Medical Necessity Clause: This procedure was medically necessary because the lesions that were treated were:
Detail Level: Detailed
Duration Of Freeze Thaw-Cycle (Seconds): 3
Post-Care Instructions: I reviewed with the patient in detail post-care instructions. Patient is to wear sunprotection, and avoid picking at any of the treated lesions. Pt may apply Vaseline to crusted or scabbing areas.
Spray Paint Text: The liquid nitrogen was applied to the skin utilizing a spray paint frosting technique.
Medical Necessity Information: It is in your best interest to select a reason for this procedure from the list below. All of these items fulfill various CMS LCD requirements except the new and changing color options.
Number Of Freeze-Thaw Cycles: 1 freeze-thaw cycle
Detail Level: Zone

## 2025-03-13 ENCOUNTER — RESULTS FOLLOW-UP (OUTPATIENT)
Dept: PULMONOLOGY | Facility: CLINIC | Age: 80
End: 2025-03-13

## 2025-03-21 DIAGNOSIS — M81.0 OSTEOPOROSIS, UNSPECIFIED OSTEOPOROSIS TYPE, UNSPECIFIED PATHOLOGICAL FRACTURE PRESENCE: ICD-10-CM

## 2025-03-21 RX ORDER — ALENDRONATE SODIUM 70 MG/1
70 TABLET ORAL WEEKLY
Qty: 4 TABLET | Refills: 0 | Status: SHIPPED | OUTPATIENT
Start: 2025-03-21

## 2025-03-31 ENCOUNTER — TELEPHONE (OUTPATIENT)
Dept: CARDIAC REHAB | Facility: HOSPITAL | Age: 80
End: 2025-03-31

## 2025-03-31 NOTE — TELEPHONE ENCOUNTER
Spoke w/billing regarding patient' 70/30 plan to determine her session cost. The bill was processed. It made an adjustment of $147.00 and paid $40.53 for the CPT . The patient has no responsibility.

## 2025-04-04 ENCOUNTER — CLINICAL SUPPORT (OUTPATIENT)
Dept: PULMONOLOGY | Facility: HOSPITAL | Age: 80
End: 2025-04-04
Payer: COMMERCIAL

## 2025-04-04 DIAGNOSIS — J84.9 ILD (INTERSTITIAL LUNG DISEASE) (HCC): Primary | ICD-10-CM

## 2025-04-04 PROCEDURE — G0239 OTH RESP PROC, GROUP: HCPCS

## 2025-04-04 NOTE — PROGRESS NOTES
Pulmonary Rehabilitation   Assessment and Individualized Treatment Plan  30 DAY      Today's date: 2025  # of Exercise Sessions Completed: 2  Patient name: Elza Kaplan     : 1945       MRN: [id number]  Referring Physician: Dr. Jhony Greenwood MD  Pulmonologist: Dr. Jenae Soriano MD >Dr. Jhony Greenwood MD  Patient switching providers due to change in campus location  Provider: [place]  Clinician: Erika Sacks, MS    Dx:  Interstitial Jose Disease  Date of onset: 2025      Treatment is tailored to this patient's individual needs.  The ITP was reviewed with the patient and all questions were answered to their satisfaction.  Additional ITP documentation can be found electronically including daily and monthly exercise summaries, daily session notes with summaries, education notes, daily medication reconciliation, and daily physician supervision.      COMMENTS: Patient was seen for her initial evaluation on 2025. She was placed on a hold until she received an EOB and wanted to see how much she would owe. She apparently has no OOP expense for her Pulmonary Rehab,  CPT code. Patient plans to attend 2X weekly. She will follow the current plan of care and goals that were set in the initial evaluation.     ASSESSMENT      Medical History:   Past Medical History:   Diagnosis Date    Allergic     Allergic rhinitis     Anxiety the passed 2 yrs    Arthritis     Cataract About 5 years ago    Colon polyp     Depression     Diabetes (HCC)     Diabetes mellitus (HCC)     Hernia 2006    Rightside    HL (hearing loss)     Hx of benign breast biopsy 10/31/2018    Left breast mammogram and ultrasound 10/10/2018.    Hyperlipidemia     Hypertension     Obesity     Osteoporosis the passed 8 yrs    Visual impairment        Family History:  Family History   Problem Relation Age of Onset    Heart failure Mother     Arthritis Mother     Hearing loss Mother     Heart disease Mother     Hypertension Mother      Hypothyroidism Mother     Diabetes Father     Alcohol abuse Father     Dementia Father     Stomach cancer Maternal Grandmother     No Known Problems Maternal Grandfather     No Known Problems Paternal Grandmother     No Known Problems Paternal Grandfather     No Known Problems Maternal Aunt     No Known Problems Maternal Aunt     No Known Problems Paternal Aunt     No Known Problems Paternal Aunt     No Known Problems Paternal Aunt     No Known Problems Paternal Aunt        Allergies:   Metformin    Current Medications:   Current Outpatient Medications   Medication Sig Dispense Refill    alendronate (FOSAMAX) 70 mg tablet Take 1 tablet by mouth once a week 4 tablet 0    azelastine (ASTELIN) 0.1 % nasal spray 1 spray into each nostril 2 (two) times a day Use in each nostril as directed 1 mL 3    benzonatate (TESSALON PERLES) 100 mg capsule Take 1 capsule (100 mg total) by mouth 3 (three) times a day as needed for cough 20 capsule 1    buPROPion (WELLBUTRIN XL) 150 mg 24 hr tablet Take 1 tablet by mouth once daily 90 tablet 0    cholecalciferol (VITAMIN D3) 1,000 units tablet Take 1,000 Units by mouth daily      guaiFENesin (Mucinex) 600 mg 12 hr tablet Take 1 tablet (600 mg total) by mouth every 12 (twelve) hours 30 tablet 0    irbesartan (AVAPRO) 150 mg tablet Take 1 tablet by mouth once daily 100 tablet 0    metoprolol succinate (TOPROL-XL) 25 mg 24 hr tablet Take 1/2 (one-half) tablet by mouth once daily 45 tablet 1    multivitamin (THERAGRAN) TABS Take 1 tablet by mouth daily      Omega 3 1000 MG CAPS Take 1,000 mg by mouth 2 (two) times a day      simvastatin (ZOCOR) 40 mg tablet Take 1 tablet by mouth once daily 30 tablet 5    traZODone (DESYREL) 50 mg tablet TAKE 1 TABLET BY MOUTH ONCE DAILY AT BEDTIME AS NEEDED FOR SLEEP 30 tablet 5     No current facility-administered medications for this visit.       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 She states at one time she was ill due to this type of exposure and had lost > 20#.     Sleep Disorders:   NA      EXERCISE ASSESSMENT:      Initial Fitness Assessment: 6 Minute Walk Test on RA  Resting Vitals: HR 73, /64, 02 sat 99%, SOB 4/10  Peak Vitals: HR 92, /74, 02 sat 97%, SOB 6/10, RPE 6/10  Achieved 422 feet, a 1.61 MET Level  Recovery Vitals: HR 68, /64, 02 Sat 98%, SOB 4/10        Current Functional Status:  Occupation: retired  Recreation/Physical activity: Sedentary at this time due to SOB, weakness and fatigue  ADL’s:able to perform self-care resumed driving  [place]: Capable of performing light ADLs only  Exercise:  none  Home exercise equipment: NA  Other Comments:     SMART Exercise Goals:   reduced score on CAT, improved 6MWT distance, reduced dyspnea during exercise, improved exercise tolerance based on peak METs tolerated in pulmonary rehab exercise session, and SpO2 >88% during exercise    Patient Specific EXERCISE GOALS:     attend pulmonary rehab regularly, decrease sitting time at home, start a walking program, increased muscular strength, increased energy, increased stamina with ADLs, and more independence at home    PSYCHOSOCIAL ASSESSMENT:    Date of last assessment: 2/25/2025  Depression screening:  PHQ-9 = 8    Interpretation:  5-9 = Mild Depression  Anxiety screening:  NICK-7 = 6    Interpretation: 5-9 = Mild anxiety    Pt self-report of depression and anxiety   Patient reports they are coping well with good social support and denies depression or  "anxiety    Self-reported stress level:  7/10  Stress Management: read, exercise, spend time outside, and spend time with family    Quality of Life Screen:  (Higher score indicates disease impact on QOL)  [facility] COOP score: 22/40      CAT: 27/40      Shortness of breath questionnaire: 63/120    Social Support:   children, son Peyman  Community/Social Activities: None at this time, very sedentary due to SOB, weakness and fatigue    SMART Goals:    Reduce perceived stress to 1-3/10, Physical Fitness in [facility] Score < 3, Daily Activity in [facility] Score < 3, Overall Health in [facility] Score < 3, feel less tired with more energy, and Improved appetite control    Patient Specific PSYCHOCOSOCIAL GOALS:    maintain compliance with medical therapy, address emotional health concerns with PCP, and spend time with family     Psychosocial Assessment as it relates to rehabilitation: Patient denies issues with his/her family or home life that may affect their rehabilitation efforts.       NUTRITION ASSESSMENT:    Initial Weight:  128  Current Weight: 128    Height:   Ht Readings from Last 1 Encounters:   03/03/25 4' 11\" (1.499 m)     12/19/2024: A1C 6.3 and   12/19/2024: Lipid Panel Chol 110, Tri 146, HDL 54 and LDL 27    Rate Your Plate Score: 66/81    Self-reported Current Dietary Habits:  Follows a low sodium diet due to renal disease.    ETOH:   Social History     Substance and Sexual Activity   Alcohol Use No       SMART Goals:   fasting BG , eat 6 or more servings of grain products per day, eat whole grain breads, brown rice and whole grain cereals, eat 5 or more servings of fruits and vegetables a day, and rarely eat processed meats or eat low fat processed meats    Patient Specific NUTRITION GOALS:    increase water intake to reduce/thin mucus production improve hydration increased muscle mass      OTHER CORE COMPONENT ASSESSMENT:    Hospitalizations in the past year: none    Oxygen needs:   Rest:  room " air  Exercise/physical activity:  room air  Sleep:   room air    Does Pt monitor home SpO2? no   Average SpO2 at rest:  NA%   Average SpO2 with ADLs/physical activity:  NA%      Use of Rescue Inhaler: NA    Use of Maintenance Inhaler: NA    Use of Nebulizer Treatments:  NA    Patient practices breathing techniques at home:  No    CAD Risk Factors:  Cholesterol: Yes  HTN: Yes  DM: Type 2   Obesity: No     Smoking: Never used    SMART Core Component Goals:   consistent, controlled resting BP < 130/80, medication compliance, and demonstrate pursed lipped breathing    Patient Specific CORE COMPONENT GOALS:    reduced dietary sodium <2000mg, medication compliance, and avoid second hand smoke/other respiratory irritants      INDIVIDUALIZED TREATMENT PLAN      EXERCISE GOALS AND PLAN    PHYSCIAN PRESCRIBED EXERCISE    Current Aerobic Exercise Prescription       Frequency: 2-3 days/week   Supplement with home exercise 2+ days/wk as tolerated        Minutes: 30-40         METS: 2-3.50              SpO2: >88% on RA              RPD: 4-6/10                      HR: RHR +30-40bpm   RPE: 4-5/10         Modalities: Treadmill, UBE, NuStep, and Recumbent bike      Aerobic Exercise Prescription Plan for Progression:    Frequency: 2-3 days/week    Supplement with home exercise 2+ days/wk as tolerated       Minutes: 40-45        METS: 3.40-4.0              SpO2: >88% on RA              RPD: 3-4/10                      HR:RHR +30-40bpm   RPE: 4-5/10        Modalities: Treadmill, UBE, NuStep, and Recumbent bike        Supplemental Oxygen Needs with Exercise:  room air and will titrate O2 to maintain SpO2 >88%.    Strength trainin-3 days / week  12-15 repetitions  1-2 sets per modality    Modalities:  UE free weights    Education: benefits of exercise for pulmonary disease, RPE scale, RPD scale, O2 saturation monitoring, and appropriate O2 response to exercise    Progress toward Exercise Goals:    Reviewed Pt goals and determined  plan of care, Will continue to educate and progress as tolerated.    Exercise Plan:  reduce time sitting at home, utilize PLB with physical activity, and begin a home exercise program     Readiness to change: Preparation:  (Getting ready to change)       NUTRITION GOALS AND PLAN    Progress toward Nutritional goals:    Reviewed Pt goals and determined plan of care, Will continue to educate and progress as tolerated.    Nutrition Plan: increase intake of whole grains, increase daily intake of fruits and vegetables, choose lean red meat, cook without fats or oils, never/rarely eat fried foods, rarely/never eat salty snacks, and choose low sugar desserts and sweets    SMART goals are based Rate Your Plate Dietary Self-Assessment. These are the areas in which the patient could score higher on the assessment.  Goals include recommendations for a heart healthy diet based on American Heart Association.    Nutrition Education: low sodium diet  maintaining hydration  group class:  Label Reading    Readiness to change: Preparation:  (Getting ready to change)       PSYCHOSOCIAL GOALS AND PLAN    Information to begin using [employer] was provided as well as contact information for counseling through  [facility].    Progress toward Psychosocial goals:    Reviewed Pt goals and determined plan of care, Will continue to educate and progress as tolerated.    Psychosocial Plan: Class:  Stress and Pulmonary Disease, Practice relaxation techniques, and Exercise    Psychosocial Education: class:  Stress and Pulmonary Disease    Readiness to change: Preparation:  (Getting ready to change)       OTHER CORE COMPONENTS GOALS AND PLAN    Tobacco Use Intervention:     N/A:  Patient is a non-smoker     Oxygen Goal: Maintain SpO2>88% during exercise or as advised by pulmonolgist    Progress toward Core Component goals:    Reviewed Pt goals and determined plan of care, Will continue to educate and progress as tolerated.    Core  Component Plan: medication compliance, avoid places with second hand smoke, avoid processed foods, and engage in regular exercise    Core Component Education:  pathophysiology of pulmonary disease    Readiness to change: Preparation:  (Getting ready to change)

## 2025-04-07 ENCOUNTER — CLINICAL SUPPORT (OUTPATIENT)
Dept: PULMONOLOGY | Facility: HOSPITAL | Age: 80
End: 2025-04-07
Payer: COMMERCIAL

## 2025-04-07 DIAGNOSIS — J84.9 ILD (INTERSTITIAL LUNG DISEASE) (HCC): ICD-10-CM

## 2025-04-07 PROCEDURE — G0239 OTH RESP PROC, GROUP: HCPCS

## 2025-04-08 ENCOUNTER — OFFICE VISIT (OUTPATIENT)
Dept: PULMONOLOGY | Facility: CLINIC | Age: 80
End: 2025-04-08
Payer: COMMERCIAL

## 2025-04-08 VITALS
TEMPERATURE: 97.7 F | OXYGEN SATURATION: 99 % | WEIGHT: 124 LBS | DIASTOLIC BLOOD PRESSURE: 72 MMHG | HEIGHT: 59 IN | HEART RATE: 69 BPM | SYSTOLIC BLOOD PRESSURE: 124 MMHG | RESPIRATION RATE: 20 BRPM | BODY MASS INDEX: 25 KG/M2

## 2025-04-08 DIAGNOSIS — R91.8 PULMONARY NODULES: ICD-10-CM

## 2025-04-08 DIAGNOSIS — R06.09 DYSPNEA ON EXERTION: ICD-10-CM

## 2025-04-08 DIAGNOSIS — R05.3 CHRONIC COUGH: ICD-10-CM

## 2025-04-08 DIAGNOSIS — J84.9 ILD (INTERSTITIAL LUNG DISEASE) (HCC): Primary | ICD-10-CM

## 2025-04-08 DIAGNOSIS — R09.82 POST-NASAL DRIP: ICD-10-CM

## 2025-04-08 DIAGNOSIS — J47.9 BRONCHIECTASIS WITHOUT COMPLICATION (HCC): ICD-10-CM

## 2025-04-08 DIAGNOSIS — R07.9 EXERTIONAL CHEST PAIN: ICD-10-CM

## 2025-04-08 PROCEDURE — 99214 OFFICE O/P EST MOD 30 MIN: CPT

## 2025-04-08 RX ORDER — FLUTICASONE PROPIONATE 50 MCG
1 SPRAY, SUSPENSION (ML) NASAL DAILY
Qty: 15.8 ML | Refills: 3 | Status: SHIPPED | OUTPATIENT
Start: 2025-04-08

## 2025-04-08 NOTE — PROGRESS NOTES
Follow-up  Visit - Pulmonary Medicine   Name: Elza Kaplan      : 1945      MRN: [id number]  Encounter Provider: JENI Bonilla  Encounter Date: 2025   Encounter department: [facility]  :  Assessment & Plan  ILD (interstitial lung disease) (HCC)  - HRCT in 2024 with stable mild bilateral lower lobe reticulations with mild bronchiolectasis.  No honeycombing.  Unknown chronicity as no prior CT imaging before   -Serological workup for autoimmune/CTD negative  -Reviewed recent PFTs from 3/10/25 and compared to PFTs in 10/2024.  She had normal spirometry, lung volumes, and diffusion capacity.  Not significantly changed since prior study  -TTE in 2024 with EF 60%, no evidence of pulmonary hypertension    Plan:  -Given stable HRCT and normal/stable PFTs, no need for antifibrotic's  -Continue to monitor for any worsening shortness of breath  -Consider follow-up PFTs in 1 year to ensure stability  -Continue pulmonary rehab       Dyspnea on exertion  - Continues with dyspnea at rest and with exertion, unchanged compared to prior.  Now experiencing chest heaviness as well.    - It is unclear what is causing her degree of shortness of breath as her prior PFTs and TTE were unremarkable.  Suspect component of deconditioning due to sedentary lifestyle   - Consider further workup such as a cardiac stress test  further evaluate shortness of breath and chest heaviness. Will refer to Cardiology for their evaluation and recommendations.  -Reviewed ED precautions for worsening symptoms and chest pain.  Would not continue with pulmonary rehab if she continues to experience the exertional chest heaviness until further workup complete      Bronchiectasis without complication (HCC)  - Mild bronchiectasis on CT imaging.  No current mucus production or issues with mucus clearance.  Continue to monitor       Pulmonary nodules  - HRCT in 2024 with stable  3 mm GILL and 5 mm RLL nodules  -Patient has no prior smoking history and is a low risk for malignancy  -Per Fleischner 2017 guidelines, no further CT imaging necessary       Chronic cough  - Likely multifactorial from postnasal drainage and ILD  - Continue saline nasal rinses, Astelin nasal spray, add Flonase nasal spray.  Continue Tessalon Perles as needed       Post-nasal drip    Orders:    fluticasone (FLONASE) 50 mcg/act nasal spray; 1 spray into each nostril daily      No follow-ups on file.    History of Present Illness   Elza Kaplan is a 80 y.o. female with a PMH of ILD, HTN, HLD, CKD 3A, and prediabetes who is here today for a follow-up visit. She had PFTs recently which were normal/no significant change compared to prior study in October . She had an HRCT in November 2024 which showed stable, mild b/l lower lobe juxtapleural reticulations and mild bronchiolectasis. Prior serological workup was normal. TTE unremarkable.     She has chronic BORRERO and dry cough. She is very sedendatry and likely deconditioned. She was referred to pulmonary rehab.  She returns today stating that her symptoms have not improved.  Still gets dyspneic at rest and with exertion.  Now also experiencing chest heaviness.  Denies any lightheadedness, dizziness, or palpitations.  No lower extremity swelling.  Continues to have postnasal drainage not relieved with Astelin nasal spray or saline nasal rinses.    Review of Systems    Aside from what is mentioned in the HPI, ROS is otherwise negative         Medical History Reviewed by provider this encounter:     .    Objective   There were no vitals taken for this visit.    Physical Exam  Vitals and nursing note reviewed.   Constitutional:       General: She is not in acute distress.     Appearance: Normal appearance. She is well-developed.   Cardiovascular:      Rate and Rhythm: Normal rate and regular rhythm.      Heart sounds: Normal heart sounds, S1 normal and S2 normal. No murmur  "heard.  Pulmonary:      Effort: Pulmonary effort is normal.      Breath sounds: Examination of the right-lower field reveals rales. Examination of the left-lower field reveals rales. Rales present. No decreased breath sounds, wheezing or rhonchi.   Musculoskeletal:         General: No swelling.      Right lower leg: No edema.      Left lower leg: No edema.   Neurological:      Mental Status: She is alert.   Psychiatric:         Mood and Affect: Mood and affect normal.         Behavior: Behavior normal. Behavior is cooperative.           Diagnostic Data:  Labs: I personally reviewed the most recent laboratory data pertinent to today's visit.      Radiology results:  Radiology Results Review: I have reviewed radiology reports from 11/12/24 including: CT chest.      PFT/spirometry results: Reviewed  No results found for: \"FEV1\", \"FVC\", \"[id number]\", \"TLC\", \"DLCO\"       Oximetry testing:      Other studies:      JENI Bonilla      "

## 2025-04-08 NOTE — ASSESSMENT & PLAN NOTE
- HRCT in November 2024 with stable mild bilateral lower lobe reticulations with mild bronchiolectasis.  No honeycombing.  Unknown chronicity as no prior CT imaging before 2024  -Serological workup for autoimmune/CTD negative  -Reviewed recent PFTs from 3/10/25 and compared to PFTs in 10/2024.  She had normal spirometry, lung volumes, and diffusion capacity.  Not significantly changed since prior study  -TTE in September 2024 with EF 60%, no evidence of pulmonary hypertension    Plan:  -Given stable HRCT and normal/stable PFTs, no need for antifibrotic's  -Continue to monitor for any worsening shortness of breath  -Consider follow-up PFTs in 1 year to ensure stability  -Continue pulmonary rehab

## 2025-04-08 NOTE — ASSESSMENT & PLAN NOTE
- Continues with dyspnea at rest and with exertion, unchanged compared to prior.  Now experiencing chest heaviness as well.    - It is unclear what is causing her degree of shortness of breath as her prior PFTs and TTE were unremarkable.  Suspect component of deconditioning due to sedentary lifestyle   - Consider further workup such as a cardiac stress test  further evaluate shortness of breath and chest heaviness. Will refer to Cardiology for their evaluation and recommendations.  -Reviewed ED precautions for worsening symptoms and chest pain.  Would not continue with pulmonary rehab if she continues to experience the exertional chest heaviness until further workup complete

## 2025-04-08 NOTE — ASSESSMENT & PLAN NOTE
- HRCT in November 2024 with stable 3 mm GILL and 5 mm RLL nodules  -Patient has no prior smoking history and is a low risk for malignancy  -Per Fleischner 2017 guidelines, no further CT imaging necessary

## 2025-04-09 ENCOUNTER — CLINICAL SUPPORT (OUTPATIENT)
Dept: PULMONOLOGY | Facility: HOSPITAL | Age: 80
End: 2025-04-09
Payer: COMMERCIAL

## 2025-04-09 DIAGNOSIS — J84.9 ILD (INTERSTITIAL LUNG DISEASE) (HCC): ICD-10-CM

## 2025-04-09 PROCEDURE — G0239 OTH RESP PROC, GROUP: HCPCS

## 2025-04-11 ENCOUNTER — TELEPHONE (OUTPATIENT)
Dept: CARDIAC REHAB | Facility: HOSPITAL | Age: 80
End: 2025-04-11

## 2025-04-11 NOTE — TELEPHONE ENCOUNTER
Patient left VM stating that her pulmonologist suggested that she hold pulmonary rehab until she sees the cardiologist. She is scheduled to see the cardiologist on 4/28/2025.

## 2025-04-14 ENCOUNTER — APPOINTMENT (OUTPATIENT)
Dept: PULMONOLOGY | Facility: HOSPITAL | Age: 80
End: 2025-04-14
Payer: COMMERCIAL

## 2025-04-16 ENCOUNTER — APPOINTMENT (OUTPATIENT)
Dept: PULMONOLOGY | Facility: HOSPITAL | Age: 80
End: 2025-04-16
Payer: COMMERCIAL

## 2025-04-21 ENCOUNTER — APPOINTMENT (OUTPATIENT)
Dept: PULMONOLOGY | Facility: HOSPITAL | Age: 80
End: 2025-04-21
Payer: COMMERCIAL

## 2025-04-23 ENCOUNTER — APPOINTMENT (OUTPATIENT)
Dept: PULMONOLOGY | Facility: HOSPITAL | Age: 80
End: 2025-04-23
Payer: COMMERCIAL

## 2025-04-28 ENCOUNTER — OFFICE VISIT (OUTPATIENT)
Dept: CARDIOLOGY CLINIC | Facility: CLINIC | Age: 80
End: 2025-04-28
Payer: COMMERCIAL

## 2025-04-28 ENCOUNTER — APPOINTMENT (OUTPATIENT)
Dept: PULMONOLOGY | Facility: HOSPITAL | Age: 80
End: 2025-04-28
Payer: COMMERCIAL

## 2025-04-28 VITALS
HEART RATE: 92 BPM | BODY MASS INDEX: 27.29 KG/M2 | RESPIRATION RATE: 22 BRPM | SYSTOLIC BLOOD PRESSURE: 142 MMHG | HEIGHT: 58 IN | DIASTOLIC BLOOD PRESSURE: 78 MMHG | WEIGHT: 130 LBS | OXYGEN SATURATION: 97 %

## 2025-04-28 DIAGNOSIS — R60.0 BILATERAL LOWER EXTREMITY EDEMA: Primary | ICD-10-CM

## 2025-04-28 DIAGNOSIS — R94.31 ABNORMAL ECG: ICD-10-CM

## 2025-04-28 DIAGNOSIS — R07.9 EXERTIONAL CHEST PAIN: ICD-10-CM

## 2025-04-28 DIAGNOSIS — E66.3 OVERWEIGHT (BMI 25.0-29.9): ICD-10-CM

## 2025-04-28 DIAGNOSIS — R06.09 DYSPNEA ON EXERTION: ICD-10-CM

## 2025-04-28 PROCEDURE — 99204 OFFICE O/P NEW MOD 45 MIN: CPT | Performed by: INTERNAL MEDICINE

## 2025-04-28 PROCEDURE — 93000 ELECTROCARDIOGRAM COMPLETE: CPT | Performed by: INTERNAL MEDICINE

## 2025-04-28 RX ORDER — TORSEMIDE 5 MG/1
5 TABLET ORAL DAILY
Qty: 3 TABLET | Refills: 0 | Status: SHIPPED | OUTPATIENT
Start: 2025-04-28

## 2025-04-28 RX ORDER — POTASSIUM CHLORIDE 750 MG/1
10 CAPSULE, EXTENDED RELEASE ORAL 2 TIMES DAILY
Qty: 6 CAPSULE | Refills: 0 | Status: SHIPPED | OUTPATIENT
Start: 2025-04-28

## 2025-04-28 NOTE — PROGRESS NOTES
Patient ID: Elza Kaplan is a 80 y.o. female.        Plan:      Assessment & Plan  Dyspnea on exertion  - Given worsening symptoms will have patient undergo transthoracic echocardiogram along with repeat laboratory analysis including CBC, CMP, BNP.  -Will also have patient undergo 48-hour Holter monitor  -Will have patient undergo coronary CTA with additional recommendations pending results  Exertional chest pain  - As patient has hip issues we will have her undergo coronary CTA with additional recommendations pending results  - Patient will take metoprolol 50 mg 1 hour prior to coronary CTA with hold parameters for heart rate less than 50 bpm  - Will check transthoracic echocardiogram.  Bilateral lower extremity edema  - Will have patient undergo laboratory analysis including CBC, CMP, BNP and TSH  -Will trial short course of diuretic therapy torsemide 5 mg daily for 3 days with 10 mEq potassium twice daily on days of torsemide and monitor response  -Patient counseled on the importance of sodium restriction to less than 1800 mg of sodium daily, fluid restriction to less than 2000 mL of fluid daily, DASH diet and NSAID avoidance  -Will check transthoracic echocardiogram.  Overweight (BMI 25.0-29.9)  - Counseled patient on dietary and lifestyle modifications  - Continue to monitor  Abnormal ECG  - Will check 48-hour Holter monitor and transthoracic echocardiogram with additional recommendations pending results.      Follow up Plan/Other summary comments:  -Lipid panel 12/19/2024 showing total cholesterol 110, triglyceride 146, HDL 54, LDL 27  - Patient will continue simvastatin 40 mg daily, metoprolol succinate 12.5 mg daily, Avapro 150 mg daily  - Patient will continue to follow with pulmonology for ongoing management of underlying lung disease  - Let patient undergo coronary CTA with additional recommendations pending results  - Counseled patient on dietary and lifestyle modifications including following a  low-salt, low-fat, heart healthy diet with sodium restriction to less than 1800 mg of sodium daily, DASH diet and NSAID avoidance  - I will see patient in 3 months or sooner if necessary once testing is completed  - Patient counseled if she were to have any warning or marked symptoms she is to seek emergency medical care immediately.    HPI:   - Patient is a 80-year-old female with hypertension, interstitial lung disease who follows with pulmonology, vascular disease, overweight, hypertriglyceridemia presents to the office today for ongoing issues with chest pain and dyspnea on exertion.  - Currently in the office today patient denies any active chest pain, palpitations, lightheadedness or dizziness, loss of consciousness, orthopnea or bendopnea.  She notes that she was recently on a cruise and noticed mild lower extremity swelling which has been persistent for the past week since she just got back.  - Patient notes her issues however with exertional shortness of breath and chest discomfort have been ongoing for the last couple of years.  She states that prior to being on her cruise and likely having too much salty foods during that time she did not have any significant issues with lower extremity edema.  She does her blood pressures at home are well-controlled.  - Patient denies any tobacco, alcohol, illicit drug use.  - Patient notes family heart disease of unknown kind and her mother in her 70s.    Most recent or relevant cardiac/vascular testing:    -Transthoracic echocardiogram 9/12/2024 showing left ventricular systolic function normal estimated LVEF 60% with grade 1 diastolic dysfunction, normal right ventricular systolic function with trace tricuspid regurgitation and IVC that was normal in size    -Chest CT 11/12/2024 showing lungs with stable mild bilateral lower lobe reticulations with heart unremarkable for patient's age with no thoracic aortic aneurysm.    - ECG performed in the office today shows sinus  "rhythm with first-degree AV block heart rate 92 bpm with possible left atrial enlargement, left anterior fascicular block.      Past Surgical History:   Procedure Laterality Date    BREAST BIOPSY Left 1990    benign per patient report    BREAST BIOPSY Left 11/13/2018    benign   NO CLIP    BREAST SURGERY Left 1990    biopsy on left breast    CHOLECYSTECTOMY      EYE SURGERY  right 6/13/2018 and left 5/16/2018    HERNIA REPAIR      HYSTERECTOMY  04/15/2016    US GUIDED BREAST BIOPSY LEFT COMPLETE Left 11/13/2018         Review of Systems   Review of Systems   Constitutional:  Positive for fatigue. Negative for chills, diaphoresis and fever.   HENT:  Negative for trouble swallowing and voice change.    Eyes:  Negative for pain and redness.   Respiratory:  Positive for shortness of breath. Negative for cough and wheezing.    Cardiovascular:  Negative for chest pain, palpitations and leg swelling.   Gastrointestinal:  Negative for abdominal pain, blood in stool, constipation, diarrhea, nausea and vomiting.   Genitourinary:  Negative for dysuria.   Musculoskeletal:  Positive for arthralgias. Negative for neck pain and neck stiffness.   Skin:  Negative for rash.   Neurological:  Negative for dizziness, syncope, light-headedness and headaches.   Psychiatric/Behavioral:  Negative for agitation and confusion.    All other systems reviewed and are negative.         Objective:     /78 (BP Location: Left arm, Patient Position: Sitting, Cuff Size: Standard)   Pulse (!) 108   Resp 22   Ht 4' 9.87\" (1.47 m)   Wt 59 kg (130 lb)   SpO2 97%   BMI 27.29 kg/m²     PHYSICAL EXAM:  Physical Exam  Vitals reviewed.   Constitutional:       General: She is not in acute distress.     Appearance: Normal appearance. She is not diaphoretic.   HENT:      Head: Normocephalic and atraumatic.   Eyes:      General:         Right eye: No discharge.         Left eye: No discharge.   Neck:      Comments: Trachea midline, neck obese, minimal " JVD appreciated  Cardiovascular:      Rate and Rhythm: Normal rate and regular rhythm.      Heart sounds: Murmur (ANNIE) heard.   Pulmonary:      Effort: No respiratory distress.      Breath sounds: No wheezing.      Comments: Decreased breath sounds bilaterally.  Chest:      Chest wall: No tenderness.   Abdominal:      General: Bowel sounds are normal.      Palpations: Abdomen is soft.      Tenderness: There is no abdominal tenderness. There is no rebound.   Musculoskeletal:      Right lower leg: Edema (1+) present.      Left lower leg: Edema (1+) present.   Skin:     General: Skin is warm and dry.   Neurological:      Mental Status: She is alert.      Comments: Awake, alert, able to answer questions appropriately, able move extremities bilaterally.   Psychiatric:         Mood and Affect: Mood normal.         Behavior: Behavior normal.          Meds reviewed.  Current Outpatient Medications on File Prior to Visit   Medication Sig Dispense Refill    alendronate (FOSAMAX) 70 mg tablet Take 1 tablet by mouth once a week 4 tablet 0    azelastine (ASTELIN) 0.1 % nasal spray 1 spray into each nostril 2 (two) times a day Use in each nostril as directed 1 mL 3    benzonatate (TESSALON PERLES) 100 mg capsule Take 1 capsule (100 mg total) by mouth 3 (three) times a day as needed for cough 20 capsule 1    buPROPion (WELLBUTRIN XL) 150 mg 24 hr tablet Take 1 tablet by mouth once daily 90 tablet 0    cholecalciferol (VITAMIN D3) 1,000 units tablet Take 1,000 Units by mouth daily      irbesartan (AVAPRO) 150 mg tablet Take 1 tablet by mouth once daily 100 tablet 0    metoprolol succinate (TOPROL-XL) 25 mg 24 hr tablet Take 1/2 (one-half) tablet by mouth once daily 45 tablet 1    multivitamin (THERAGRAN) TABS Take 1 tablet by mouth daily      Omega 3 1000 MG CAPS Take 1,000 mg by mouth 2 (two) times a day      simvastatin (ZOCOR) 40 mg tablet Take 1 tablet by mouth once daily 30 tablet 5    traZODone (DESYREL) 50 mg tablet TAKE 1  TABLET BY MOUTH ONCE DAILY AT BEDTIME AS NEEDED FOR SLEEP 30 tablet 5    fluticasone (FLONASE) 50 mcg/act nasal spray 1 spray into each nostril daily 15.8 mL 3    guaiFENesin (Mucinex) 600 mg 12 hr tablet Take 1 tablet (600 mg total) by mouth every 12 (twelve) hours 30 tablet 0     No current facility-administered medications on file prior to visit.      Past Medical History:   Diagnosis Date    Allergic     Allergic rhinitis     Anxiety the passed 2 yrs    Arthritis     Cataract About 5 years ago    Colon polyp     Depression     Diabetes mellitus (HCC)     Hernia 2006    Rightside    HL (hearing loss)     Hx of benign breast biopsy 10/31/2018    Left breast mammogram and ultrasound 10/10/2018.    Hyperlipidemia     Hypertension     Interstitial lung disease     Obesity     Osteoporosis the passed 8 yrs    Visual impairment            Social History     Tobacco Use   Smoking Status Never   Smokeless Tobacco Never     Family History   Problem Relation Age of Onset    Heart failure Mother     Arthritis Mother     Hearing loss Mother     Heart disease Mother     Hypertension Mother     Hypothyroidism Mother     Diabetes Father     Alcohol abuse Father     Dementia Father     Stomach cancer Maternal Grandmother     No Known Problems Maternal Grandfather     No Known Problems Paternal Grandmother     No Known Problems Paternal Grandfather     No Known Problems Maternal Aunt     No Known Problems Maternal Aunt     No Known Problems Paternal Aunt     No Known Problems Paternal Aunt     No Known Problems Paternal Aunt     No Known Problems Paternal Aunt

## 2025-04-28 NOTE — ASSESSMENT & PLAN NOTE
- Will have patient undergo laboratory analysis including CBC, CMP, BNP and TSH  -Will trial short course of diuretic therapy torsemide 5 mg daily for 3 days with 10 mEq potassium twice daily on days of torsemide and monitor response  -Patient counseled on the importance of sodium restriction to less than 1800 mg of sodium daily, fluid restriction to less than 2000 mL of fluid daily, DASH diet and NSAID avoidance  -Will check transthoracic echocardiogram.

## 2025-04-28 NOTE — ASSESSMENT & PLAN NOTE
- Given worsening symptoms will have patient undergo transthoracic echocardiogram along with repeat laboratory analysis including CBC, CMP, BNP.  -Will also have patient undergo 48-hour Holter monitor  -Will have patient undergo coronary CTA with additional recommendations pending results

## 2025-04-29 DIAGNOSIS — M81.0 OSTEOPOROSIS, UNSPECIFIED OSTEOPOROSIS TYPE, UNSPECIFIED PATHOLOGICAL FRACTURE PRESENCE: ICD-10-CM

## 2025-04-30 RX ORDER — ALENDRONATE SODIUM 70 MG/1
70 TABLET ORAL WEEKLY
Qty: 4 TABLET | Refills: 0 | Status: SHIPPED | OUTPATIENT
Start: 2025-04-30

## 2025-05-05 ENCOUNTER — APPOINTMENT (OUTPATIENT)
Age: 80
End: 2025-05-05
Payer: COMMERCIAL

## 2025-05-05 DIAGNOSIS — R07.9 EXERTIONAL CHEST PAIN: ICD-10-CM

## 2025-05-05 DIAGNOSIS — R60.0 BILATERAL LOWER EXTREMITY EDEMA: ICD-10-CM

## 2025-05-05 DIAGNOSIS — R06.09 DYSPNEA ON EXERTION: ICD-10-CM

## 2025-05-05 DIAGNOSIS — E66.3 OVERWEIGHT (BMI 25.0-29.9): ICD-10-CM

## 2025-05-05 LAB
ALBUMIN SERPL BCG-MCNC: 3.8 G/DL (ref 3.5–5)
ALP SERPL-CCNC: 59 U/L (ref 34–104)
ALT SERPL W P-5'-P-CCNC: 31 U/L (ref 7–52)
ANION GAP SERPL CALCULATED.3IONS-SCNC: 8 MMOL/L (ref 4–13)
AST SERPL W P-5'-P-CCNC: 27 U/L (ref 13–39)
BASOPHILS # BLD AUTO: 0.06 THOUSANDS/ÂΜL (ref 0–0.1)
BASOPHILS NFR BLD AUTO: 1 % (ref 0–1)
BILIRUB SERPL-MCNC: 0.88 MG/DL (ref 0.2–1)
BNP SERPL-MCNC: 17 PG/ML (ref 0–100)
BUN SERPL-MCNC: 14 MG/DL (ref 5–25)
CALCIUM SERPL-MCNC: 8.9 MG/DL (ref 8.4–10.2)
CHLORIDE SERPL-SCNC: 102 MMOL/L (ref 96–108)
CO2 SERPL-SCNC: 27 MMOL/L (ref 21–32)
CREAT SERPL-MCNC: 0.77 MG/DL (ref 0.6–1.3)
EOSINOPHIL # BLD AUTO: 0.39 THOUSAND/ÂΜL (ref 0–0.61)
EOSINOPHIL NFR BLD AUTO: 7 % (ref 0–6)
ERYTHROCYTE [DISTWIDTH] IN BLOOD BY AUTOMATED COUNT: 13.5 % (ref 11.6–15.1)
GFR SERPL CREATININE-BSD FRML MDRD: 73 ML/MIN/1.73SQ M
GLUCOSE P FAST SERPL-MCNC: 175 MG/DL (ref 65–99)
HCT VFR BLD AUTO: 43.5 % (ref 34.8–46.1)
HGB BLD-MCNC: 14.3 G/DL (ref 11.5–15.4)
IMM GRANULOCYTES # BLD AUTO: 0.03 THOUSAND/UL (ref 0–0.2)
IMM GRANULOCYTES NFR BLD AUTO: 1 % (ref 0–2)
LYMPHOCYTES # BLD AUTO: 1.22 THOUSANDS/ÂΜL (ref 0.6–4.47)
LYMPHOCYTES NFR BLD AUTO: 21 % (ref 14–44)
MCH RBC QN AUTO: 31.2 PG (ref 26.8–34.3)
MCHC RBC AUTO-ENTMCNC: 32.9 G/DL (ref 31.4–37.4)
MCV RBC AUTO: 95 FL (ref 82–98)
MONOCYTES # BLD AUTO: 0.66 THOUSAND/ÂΜL (ref 0.17–1.22)
MONOCYTES NFR BLD AUTO: 11 % (ref 4–12)
NEUTROPHILS # BLD AUTO: 3.46 THOUSANDS/ÂΜL (ref 1.85–7.62)
NEUTS SEG NFR BLD AUTO: 59 % (ref 43–75)
NRBC BLD AUTO-RTO: 0 /100 WBCS
PLATELET # BLD AUTO: 240 THOUSANDS/UL (ref 149–390)
PMV BLD AUTO: 9.6 FL (ref 8.9–12.7)
POTASSIUM SERPL-SCNC: 4.1 MMOL/L (ref 3.5–5.3)
PROT SERPL-MCNC: 6.4 G/DL (ref 6.4–8.4)
RBC # BLD AUTO: 4.58 MILLION/UL (ref 3.81–5.12)
SODIUM SERPL-SCNC: 137 MMOL/L (ref 135–147)
TSH SERPL DL<=0.05 MIU/L-ACNC: 2.62 UIU/ML (ref 0.45–4.5)
WBC # BLD AUTO: 5.82 THOUSAND/UL (ref 4.31–10.16)

## 2025-05-05 PROCEDURE — 36415 COLL VENOUS BLD VENIPUNCTURE: CPT

## 2025-05-05 PROCEDURE — 80053 COMPREHEN METABOLIC PANEL: CPT

## 2025-05-05 PROCEDURE — 83880 ASSAY OF NATRIURETIC PEPTIDE: CPT

## 2025-05-05 PROCEDURE — 85025 COMPLETE CBC W/AUTO DIFF WBC: CPT

## 2025-05-05 PROCEDURE — 84443 ASSAY THYROID STIM HORMONE: CPT

## 2025-05-06 ENCOUNTER — RESULTS FOLLOW-UP (OUTPATIENT)
Dept: NON INVASIVE DIAGNOSTICS | Facility: HOSPITAL | Age: 80
End: 2025-05-06

## 2025-05-18 DIAGNOSIS — F32.A DEPRESSION, UNSPECIFIED DEPRESSION TYPE: ICD-10-CM

## 2025-05-18 RX ORDER — BUPROPION HYDROCHLORIDE 150 MG/1
150 TABLET ORAL DAILY
Qty: 90 TABLET | Refills: 0 | Status: SHIPPED | OUTPATIENT
Start: 2025-05-18

## 2025-05-20 ENCOUNTER — HOSPITAL ENCOUNTER (OUTPATIENT)
Dept: NON INVASIVE DIAGNOSTICS | Facility: CLINIC | Age: 80
Discharge: HOME | End: 2025-05-20
Attending: INTERNAL MEDICINE
Payer: COMMERCIAL

## 2025-05-20 VITALS
HEART RATE: 77 BPM | WEIGHT: 130 LBS | DIASTOLIC BLOOD PRESSURE: 78 MMHG | SYSTOLIC BLOOD PRESSURE: 142 MMHG | HEIGHT: 58 IN | BODY MASS INDEX: 27.29 KG/M2

## 2025-05-20 DIAGNOSIS — E66.3 OVERWEIGHT (BMI 25.0-29.9): ICD-10-CM

## 2025-05-20 DIAGNOSIS — R60.0 BILATERAL LOWER EXTREMITY EDEMA: ICD-10-CM

## 2025-05-20 DIAGNOSIS — R07.9 EXERTIONAL CHEST PAIN: ICD-10-CM

## 2025-05-20 DIAGNOSIS — R06.09 DYSPNEA ON EXERTION: ICD-10-CM

## 2025-05-20 LAB
AORTIC ROOT: 3.1 CM
AORTIC VALVE MEAN VELOCITY: 7.9 M/S
ASCENDING AORTA: 2.6 CM
AV AREA BY CONTINUOUS VTI: 2.1 CM2
AV AREA PEAK VELOCITY: 1.8 CM2
AV LVOT MEAN GRADIENT: 1 MMHG
AV LVOT PEAK GRADIENT: 2 MMHG
AV MEAN PRESS GRAD SYS DOP V1V2: 3 MMHG
AV ORIFICE AREA US: 2.12 CM2
AV PEAK GRADIENT: 6 MMHG
AV VELOCITY RATIO: 0.67
AV VMAX SYS DOP: 1.25 M/S
BSA FOR ECHO PROCEDURE: 1.52 M2
DOP CALC AO VTI: 26.07 CM
DOP CALC LVOT AREA: 3.14 CM2
DOP CALC LVOT CARDIAC INDEX: 2.38 L/MIN/M2
DOP CALC LVOT CARDIAC OUTPUT: 3.6 L/MIN
DOP CALC LVOT DIAMETER: 2 CM
DOP CALC LVOT PEAK VEL VTI: 17.56 CM
DOP CALC LVOT PEAK VEL: 0.73 M/S
DOP CALC LVOT STROKE INDEX: 38.4 ML/M2
DOP CALC LVOT STROKE VOLUME: 58
E WAVE DECELERATION TIME: 165 MS
E/A RATIO: 0.7
FRACTIONAL SHORTENING: 30 (ref 28–44)
INTERVENTRICULAR SEPTUM IN DIASTOLE (PARASTERNAL SHORT AXIS VIEW): 1.1 CM
INTERVENTRICULAR SEPTUM: 1.1 CM (ref 0.6–1.1)
LAAS-AP2: 12 CM2
LAAS-AP4: 11.8 CM2
LEFT ATRIUM SIZE: 2.7 CM
LEFT ATRIUM VOLUME (MOD BIPLANE): 27 ML
LEFT ATRIUM VOLUME INDEX (MOD BIPLANE): 17.9 ML/M2
LEFT INTERNAL DIMENSION IN SYSTOLE: 2.6 CM (ref 2.1–4)
LEFT VENTRICULAR INTERNAL DIMENSION IN DIASTOLE: 3.7 CM (ref 3.5–6)
LEFT VENTRICULAR POSTERIOR WALL IN END DIASTOLE: 1 CM
LEFT VENTRICULAR STROKE VOLUME: 34 ML
LV EF US.2D.A4C+ESTIMATED: 52 %
LVSV (TEICH): 34 ML
MV E'TISSUE VEL-SEP: 7 CM/S
MV PEAK A VEL: 1.12 M/S
MV PEAK E VEL: 78 CM/S
MV STENOSIS PRESSURE HALF TIME: 48 MS
MV VALVE AREA P 1/2 METHOD: 4.6
RIGHT ATRIUM AREA SYSTOLE A4C: 10.1 CM2
RIGHT VENTRICLE ID DIMENSION: 2.3 CM
SL CV LEFT ATRIUM LENGTH A2C: 3.9 CM
SL CV LV EF: 55
SL CV PED ECHO LEFT VENTRICLE DIASTOLIC VOLUME (MOD BIPLANE) 2D: 58 ML
SL CV PED ECHO LEFT VENTRICLE SYSTOLIC VOLUME (MOD BIPLANE) 2D: 25 ML
TRICUSPID ANNULAR PLANE SYSTOLIC EXCURSION: 1.5 CM

## 2025-05-20 PROCEDURE — 93226 XTRNL ECG REC<48 HR SCAN A/R: CPT

## 2025-05-20 PROCEDURE — 93225 XTRNL ECG REC<48 HRS REC: CPT

## 2025-05-20 PROCEDURE — 93306 TTE W/DOPPLER COMPLETE: CPT | Performed by: INTERNAL MEDICINE

## 2025-05-20 PROCEDURE — 93306 TTE W/DOPPLER COMPLETE: CPT

## 2025-05-25 DIAGNOSIS — G47.00 INSOMNIA, UNSPECIFIED TYPE: ICD-10-CM

## 2025-05-25 DIAGNOSIS — I10 ESSENTIAL HYPERTENSION: ICD-10-CM

## 2025-05-25 DIAGNOSIS — R00.0 TACHYCARDIA: ICD-10-CM

## 2025-05-25 RX ORDER — METOPROLOL SUCCINATE 25 MG/1
TABLET, EXTENDED RELEASE ORAL
Qty: 45 TABLET | Refills: 1 | Status: SHIPPED | OUTPATIENT
Start: 2025-05-25

## 2025-05-27 DIAGNOSIS — M81.0 OSTEOPOROSIS, UNSPECIFIED OSTEOPOROSIS TYPE, UNSPECIFIED PATHOLOGICAL FRACTURE PRESENCE: ICD-10-CM

## 2025-05-27 RX ORDER — IRBESARTAN 150 MG/1
150 TABLET ORAL DAILY
Qty: 100 TABLET | Refills: 0 | Status: SHIPPED | OUTPATIENT
Start: 2025-05-27

## 2025-05-27 RX ORDER — TRAZODONE HYDROCHLORIDE 50 MG/1
50 TABLET ORAL
Qty: 30 TABLET | Refills: 0 | Status: SHIPPED | OUTPATIENT
Start: 2025-05-27

## 2025-05-28 RX ORDER — ALENDRONATE SODIUM 70 MG/1
70 TABLET ORAL WEEKLY
Qty: 4 TABLET | Refills: 3 | Status: SHIPPED | OUTPATIENT
Start: 2025-05-28

## 2025-05-28 NOTE — NURSING NOTE
Cardiac CTA Questionairre      Cardiac history    Calcium Score:  NA     Reason for exam: Chest pain and BORRERO      Have you had a CABG, angioplasty, cardiac cath, stent placement? NO     Do you have a pacemaker or defibrillator? NO     Have you ever been diagnosed with an irregular heart beat? YES     Do you have a history of having COPD or asthma?  NO                          Do you have high blood pressure? YES     Do you have diabetes? NO     Did you ever smoke? NO                Do you have allergies?  see Chart  Allergy to intravenous contrast or dye? NO       Do you have kidney disease?  YES 3 a  Fluid restrictions: Per pt NO, Per chart <2000 ml                              Blood work done: 25            GFR: 73       Call placed to patient to discuss upcoming appointment at [facility] radiology department and complete consultation and Questionnaire with patient via phone. Patient is having a Cardiac CTA. Reviewed patient's allergies, also reviewed medications. Beta blocker ordered by Cardiologist for patient to take an increase dose of her own medication and patient aware to take 1 1/2  hour prior to scan time at 0930. Test instructions given, patient aware to hold all caffeine products for 12 hours prior (2300)to the exam this includes coffee, decaf, tea, soda and chocolate.  Also made aware to avoid solid food for 3 hours prior (0800) to exam and to increase fluids one day prior to exam unless contraindicated and to drink 5 cups (40 oz of water) prior to scan.  Patient was instructed that they may take all medications as usual unless otherwise directed by physician. Patient instructed to not use inhalers prior to exam, if uses may need to reschedule the study to another day. Discussed the pre procedure expectations, post procedure expectations, time entailed to perform the study and also the medications that may be used in exam (beta blocker if needed to  heart rate to under 65 beats per minute  for optimal exam results and NTG given during scan for vasodilation). Reminded patient of location and time expected for procedure, instructed to bring photo ID, insurance card and script. Recommendation for  to and from study given to patient.  Informed the patient that the study will last approximately 30-45 minutes. Pt verbalizes understanding of education and instructions. My number was also supplied to patient to call if any further questions or concerns should arise pre or post procedure.   Instructions also sent via e-mail to verified address and via epic my chart see message below.     Upcoming Radiology appointment at [facility]  Good morning Yohan Ramos are scheduled on Tuesday 6/3/25 at 11 am for a CTA Cardiac in the Radiology department of the [facility]. Please arrive 15 minutes prior to your appointment time.     [facility] is located at [address] [place] [address], [address] 1st floor. Present yourself to admission services that is located on the ground floor to the left of the information desk in Building B to register for your test. Please sign in using the Kiosk (if any issues with your registration, an admission personnel will assist you). Once registered you can go up to the 1st floor - radiology department (it will be to the right at the main corridor on the 1st floor).    It is recommended that you come to your appointment with a  for your safety.    Please avoid all caffeine products for 12 hours prior to the exam this includes coffee, decaf, tea, soda, or chocolate. This will be in effect from 11 pm until after your appointment time.    You are also to stop solid food 3 hours prior to the exam at 8 am but you can continue to drink clear fluids (excluding coffee, decaf, tea, soda, or chocolate) prior to the exam.    If not on fluid restriction, please increase fluids starting the day prior. Also please drink 40 oz of water (5 cups)  of water during the morning of your scan. You will be allowed to go to the restroom prior to the study.    You can take all your regular prescribed medication as usual unless otherwise directed by your physician.    Please do not use any inhaler medication prior to the exam, if used you may need to reschedule your test.    Your ordering provider has instructed for Metoprolol  50 mg of your own medication to be taken, please take 1 ½ hour prior to the scan at 7:30 am .   Hold medication if HR is less than 50 beats per minute.    During the study a certain medication may be given such as a beta blocker if needed to  heart rate to under 65 beats per minute for optimal exam results.  Two sublingual tablets of nitroglycerin will be given during scan for vasodilation, prior to contrast administration and final scan. If the heart rate is not able to achieve target (55-60 beats per minute) study most likely will be cancelled after discussing with Radiologist. At a higher heart rate study may not be useful or viable.    For your comfort we ask to please  -Dress in clothing that will allow easy access to your mid-arm for your IV insertion and your chest area.   -Your skin will be cleaned with a special cleanser that will feel cool to touch and may feel slightly abrasive for better quality heart rhythm.  Women - Please be aware that you may be asked to remove your brassiere (bra) if it has metal in it.    The study can last approximately 30-45 minutes.    Results of the study can take from 3 to 5 business days, you will see the results also on my chart should you have the application.    If you have any questions or concerns regarding the above information, please feel free to send me a response via a call, email or [facility] my chart.      If we have not spoken as of yet, if you understand the above information and have no further questions or concerns, can you please send me a response via a call, email or [facility]  my chart that you have received and understood the information.    May you have a good day,  Kajal Whitley RN  [facility] Radiology RN  [address] [place] [address] [facility] [address]  [phone] (Office)  [phone] (Fax)  [email or web address]

## 2025-06-03 ENCOUNTER — HOSPITAL ENCOUNTER (OUTPATIENT)
Dept: RADIOLOGY | Facility: HOSPITAL | Age: 80
Discharge: HOME | End: 2025-06-03
Attending: INTERNAL MEDICINE
Payer: COMMERCIAL

## 2025-06-03 VITALS
OXYGEN SATURATION: 97 % | DIASTOLIC BLOOD PRESSURE: 67 MMHG | HEART RATE: 62 BPM | RESPIRATION RATE: 14 BRPM | SYSTOLIC BLOOD PRESSURE: 116 MMHG

## 2025-06-03 DIAGNOSIS — R60.0 BILATERAL LOWER EXTREMITY EDEMA: ICD-10-CM

## 2025-06-03 DIAGNOSIS — R07.9 EXERTIONAL CHEST PAIN: ICD-10-CM

## 2025-06-03 DIAGNOSIS — E66.3 OVERWEIGHT (BMI 25.0-29.9): ICD-10-CM

## 2025-06-03 DIAGNOSIS — R06.09 DYSPNEA ON EXERTION: ICD-10-CM

## 2025-06-03 PROCEDURE — 75574 CT ANGIO HRT W/3D IMAGE: CPT

## 2025-06-03 RX ORDER — NITROGLYCERIN 0.4 MG/1
0.8 TABLET SUBLINGUAL
Status: DISCONTINUED | OUTPATIENT
Start: 2025-06-03 | End: 2025-06-04 | Stop reason: HOSPADM

## 2025-06-03 RX ADMIN — NITROGLYCERIN 0.8 MG: 0.4 TABLET SUBLINGUAL at 11:38

## 2025-06-16 ENCOUNTER — OFFICE VISIT (OUTPATIENT)
Dept: CARDIOLOGY CLINIC | Facility: CLINIC | Age: 80
End: 2025-06-16
Payer: COMMERCIAL

## 2025-06-16 VITALS
SYSTOLIC BLOOD PRESSURE: 134 MMHG | WEIGHT: 124 LBS | DIASTOLIC BLOOD PRESSURE: 76 MMHG | OXYGEN SATURATION: 98 % | BODY MASS INDEX: 26.03 KG/M2 | HEART RATE: 76 BPM | RESPIRATION RATE: 18 BRPM | HEIGHT: 58 IN

## 2025-06-16 DIAGNOSIS — E78.1 HYPERTRIGLYCERIDEMIA: ICD-10-CM

## 2025-06-16 DIAGNOSIS — R06.09 DYSPNEA ON EXERTION: ICD-10-CM

## 2025-06-16 DIAGNOSIS — J84.9 ILD (INTERSTITIAL LUNG DISEASE) (HCC): ICD-10-CM

## 2025-06-16 DIAGNOSIS — E66.3 OVERWEIGHT (BMI 25.0-29.9): ICD-10-CM

## 2025-06-16 DIAGNOSIS — I10 ESSENTIAL HYPERTENSION: Primary | ICD-10-CM

## 2025-06-16 PROCEDURE — 99214 OFFICE O/P EST MOD 30 MIN: CPT | Performed by: INTERNAL MEDICINE

## 2025-06-16 NOTE — ASSESSMENT & PLAN NOTE
- Visualized again on coronary CTA  -Patient will continue to follow with pulmonology for monitoring and evaluation

## 2025-06-16 NOTE — PROGRESS NOTES
Patient ID: Elza Kaplan is a 80 y.o. female.        Plan:      Assessment & Plan  Essential hypertension  - Counseled patient on dietary and lifestyle modifications  -Continue metoprolol succinate 12.5 mg daily and Avapro 150 mg daily  -Continue to monitor  ILD (interstitial lung disease) (HCC)  - Visualized again on coronary CTA  -Patient will continue to follow with pulmonology for monitoring and evaluation  Overweight (BMI 25.0-29.9)  - Counseled patient on dietary and lifestyle modifications  -Continue to monitor   Dyspnea on exertion  - BNP from May 2025 within normal limits and otherwise testing unremarkable  - Continue to follow pulmonology  - Counseled on dietary lifestyle modifications along with gradual titration of medical therapy with monitoring for symptoms  - Continue to monitor  Hypertriglyceridemia  - Counseled patient on dietary and lifestyle modifications  - Patient currently maintained on simvastatin 40 mg daily  - Continue to monitor.      Follow up Plan/Other summary comments:  -BNP from laboratory studies 5/5/2025 within normal range at 17  - Will hold off further diuretic therapy given laboratory analysis, echocardiographic findings and coronary CTA  -Lipid panel 12/19/2024 showing total cholesterol 110, triglyceride 146, HDL 54, LDL 27  - Counseled patient on dietary and lifestyle modifications including following a low-salt, low-fat, heart healthy diet with sodium restriction to less than 1800 mg of sodium daily, DASH diet, NSAID avoidance, need for leg elevation and monitoring for symptoms  - Patient can continue simvastatin 40 mg daily, metoprolol succinate 12.5 mg daily and Avapro 150 mg daily  - Patient will continue to follow with pulmonology for ongoing evaluation and monitoring of lung disease.  - Counseled patient on recommendations for gradual up titration and physical capacity and monitoring for symptoms  -Patient will monitor home blood pressure readings let her office  know her primary care physician's office know if significantly elevated greater than 130/80's MHG for up titration of medical therapy.  - I will see patient in 6 months or sooner if necessary  - Patient counseled if she were to have any warning or alarm type symptoms she is to seek emergency medical care immediately.    HPI:   - Patient is an 80-year-old female with hypertension, interstitial lung disease and follows with pulmonology, vascular disease, overweight and hypertriglyceridemia who originally presented to the office in April 2025 for further evaluation of dyspnea on exertion and chest discomfort.  She underwent additional testing and presents to the office today for scheduled follow-up.  - Currently in the office today patient denies any chest pain, palpitations, lightheadedness or dizziness, loss of consciousness, shortness of breath at rest, lower extremity edema, orthopnea or bendopnea.  She states overall she is doing about the same and has not noticed any worsening of her shortness of breath on exertion but no significant improvement.  She states she even trialed diuretic therapy as requested and while she did notice a significant urine output increase with this it did not change her shortness of breath at all.  She states overall her blood pressures at home are well-controlled and she denies any other change in symptomatology.    Most recent or relevant cardiac/vascular testing:    - Coronary CTA 6/3/2025 showing overall calcium score of 0 with no significant coronary artery disease appreciated with mild calcification of the mitral valve annulus and aortic valve with recommendations for reassurance and documentation stating unchanged interstitial lung abnormalities with management per pulmonology as recommended.    -Holter monitor 5/20/2025 showing predominantly sinus rhythm average heart rate 67 bpm with overall rare ventricular ectopic activity, but 0.1% of total monitored beats and rare  "supraventricular ectopic activity with no significant atrial fibrillation appreciated.      -Transthoracic echocardiogram 5/20/2025 showing left-ventricular systolic function normal estimated LVEF 55% with grade 1 diastolic dysfunction, trace mitral regurgitation, trace aortic regurgitation, aortic valve sclerosis.      Past Surgical History[1]      Review of Systems   Review of Systems   Constitutional:  Negative for chills, diaphoresis, fatigue and fever.   HENT:  Negative for trouble swallowing and voice change.    Eyes:  Negative for pain and redness.   Respiratory:  Negative for shortness of breath and wheezing.    Cardiovascular:  Negative for chest pain, palpitations and leg swelling.   Gastrointestinal:  Negative for abdominal pain, blood in stool, constipation, diarrhea, nausea and vomiting.   Genitourinary:  Negative for dysuria.   Musculoskeletal:  Positive for arthralgias. Negative for neck pain and neck stiffness.   Skin:  Negative for rash.   Neurological:  Negative for dizziness, syncope, light-headedness and headaches.   Psychiatric/Behavioral:  Negative for agitation and confusion.    All other systems reviewed and are negative.           Objective:     /76 (BP Location: Left arm, Patient Position: Sitting, Cuff Size: Standard)   Pulse 76   Resp 18   Ht 4' 10\" (1.473 m)   Wt 56.2 kg (124 lb)   SpO2 98%   BMI 25.92 kg/m²     PHYSICAL EXAM:  Physical Exam  Vitals reviewed.   Constitutional:       General: She is not in acute distress.     Appearance: She is well-developed. She is not diaphoretic.   HENT:      Head: Normocephalic and atraumatic.   Neck:      Vascular: No JVD.      Trachea: No tracheal deviation.     Cardiovascular:      Rate and Rhythm: Normal rate and regular rhythm.      Heart sounds: Murmur (ANNIE) heard.      No friction rub.   Pulmonary:      Effort: Pulmonary effort is normal. No tachypnea or respiratory distress.      Breath sounds: Decreased breath sounds present. No " wheezing.     Musculoskeletal:      Right lower leg: No edema.      Left lower leg: No edema.     Skin:     General: Skin is warm and dry.     Neurological:      Mental Status: She is alert.      Comments: Awake, alert, able to answer questions appropriately, able to move extremities bilaterally.   Psychiatric:         Mood and Affect: Mood normal.         Behavior: Behavior normal.          Meds reviewed.  Medications Ordered Prior to Encounter[2]   Past Medical History[3]        Tobacco Use History[4]  Family History[5]                 [1]   Past Surgical History:  Procedure Laterality Date    BREAST BIOPSY Left 1990    benign per patient report    BREAST BIOPSY Left 11/13/2018    benign   NO CLIP    BREAST SURGERY Left 1990    biopsy on left breast    CHOLECYSTECTOMY      EYE SURGERY  right 6/13/2018 and left 5/16/2018    HERNIA REPAIR      HYSTERECTOMY  04/15/2016    US GUIDED BREAST BIOPSY LEFT COMPLETE Left 11/13/2018   [2]   Current Outpatient Medications on File Prior to Visit   Medication Sig Dispense Refill    alendronate (FOSAMAX) 70 mg tablet Take 1 tablet by mouth once a week 4 tablet 3    azelastine (ASTELIN) 0.1 % nasal spray 1 spray into each nostril 2 (two) times a day Use in each nostril as directed 1 mL 3    benzonatate (TESSALON PERLES) 100 mg capsule Take 1 capsule (100 mg total) by mouth 3 (three) times a day as needed for cough 20 capsule 1    buPROPion (WELLBUTRIN XL) 150 mg 24 hr tablet Take 1 tablet by mouth once daily 90 tablet 0    cholecalciferol (VITAMIN D3) 1,000 units tablet Take 1,000 Units by mouth in the morning.      fluticasone (FLONASE) 50 mcg/act nasal spray 1 spray into each nostril daily 15.8 mL 3    guaiFENesin (Mucinex) 600 mg 12 hr tablet Take 1 tablet (600 mg total) by mouth every 12 (twelve) hours 30 tablet 0    irbesartan (AVAPRO) 150 mg tablet Take 1 tablet by mouth once daily 100 tablet 0    multivitamin (THERAGRAN) TABS Take 1 tablet by mouth in the morning.       Omega 3 1000 MG CAPS Take 1,000 mg by mouth in the morning and 1,000 mg before bedtime.      simvastatin (ZOCOR) 40 mg tablet Take 1 tablet by mouth once daily 30 tablet 5    traZODone (DESYREL) 50 mg tablet TAKE 1 TABLET BY MOUTH ONCE DAILY AT BEDTIME AS NEEDED FOR SLEEP 30 tablet 0    [DISCONTINUED] metoprolol succinate (TOPROL-XL) 25 mg 24 hr tablet Take 1/2 (one-half) tablet by mouth once daily 45 tablet 1    [DISCONTINUED] potassium chloride (MICRO-K) 10 MEQ CR capsule Take 1 capsule (10 mEq total) by mouth 2 (two) times a day 6 capsule 0    [DISCONTINUED] torsemide (DEMADEX) 5 MG tablet Take 1 tablet (5 mg total) by mouth daily 3 tablet 0     No current facility-administered medications on file prior to visit.   [3]   Past Medical History:  Diagnosis Date    Allergic     Allergic rhinitis     Anxiety the passed 2 yrs    Arthritis     Cataract About 5 years ago    Colon polyp     Depression     Diabetes mellitus (HCC)     Hernia 2006    Rightside    HL (hearing loss)     Hx of benign breast biopsy 10/31/2018    Left breast mammogram and ultrasound 10/10/2018.    Hyperlipidemia     Hypertension     Interstitial lung disease     Obesity     Osteoporosis the passed 8 yrs    Visual impairment    [4]   Social History  Tobacco Use   Smoking Status Never   Smokeless Tobacco Never   [5]   Family History  Problem Relation Name Age of Onset    Heart failure Mother Sara Reyes     Arthritis Mother Saraeagle Petersalexx     Hearing loss Mother Sara Reyes     Heart disease Mother Sara Reyes     Hypertension Mother Sara Reyes     Hypothyroidism Mother Sara Reyes     Diabetes Father Maury Reyes     Alcohol abuse Father Maury Reyes     Dementia Father Maury Reyes     Stomach cancer Maternal Grandmother      No Known Problems Maternal Grandfather      No Known Problems Paternal Grandmother      No Known Problems Paternal Grandfather      No Known Problems Maternal Aunt      No Known Problems  Maternal Aunt      No Known Problems Paternal Aunt      No Known Problems Paternal Aunt      No Known Problems Paternal Aunt      No Known Problems Paternal Aunt

## 2025-06-16 NOTE — ASSESSMENT & PLAN NOTE
- Counseled patient on dietary and lifestyle modifications  -Continue metoprolol succinate 12.5 mg daily and Avapro 150 mg daily  -Continue to monitor

## 2025-06-16 NOTE — ASSESSMENT & PLAN NOTE
- BNP from May 2025 within normal limits and otherwise testing unremarkable  - Continue to follow pulmonology  - Counseled on dietary lifestyle modifications along with gradual titration of medical therapy with monitoring for symptoms  - Continue to monitor

## 2025-06-16 NOTE — ASSESSMENT & PLAN NOTE
- Counseled patient on dietary and lifestyle modifications  - Patient currently maintained on simvastatin 40 mg daily  - Continue to monitor.

## 2025-06-29 DIAGNOSIS — G47.00 INSOMNIA, UNSPECIFIED TYPE: ICD-10-CM

## 2025-07-01 RX ORDER — TRAZODONE HYDROCHLORIDE 50 MG/1
50 TABLET ORAL
Qty: 30 TABLET | Refills: 5 | Status: SHIPPED | OUTPATIENT
Start: 2025-07-01

## 2025-07-08 ENCOUNTER — OFFICE VISIT (OUTPATIENT)
Dept: PULMONOLOGY | Facility: CLINIC | Age: 80
End: 2025-07-08
Payer: COMMERCIAL

## 2025-07-08 VITALS
BODY MASS INDEX: 25.86 KG/M2 | TEMPERATURE: 97.6 F | OXYGEN SATURATION: 97 % | WEIGHT: 123.2 LBS | RESPIRATION RATE: 18 BRPM | SYSTOLIC BLOOD PRESSURE: 110 MMHG | HEIGHT: 58 IN | HEART RATE: 78 BPM | DIASTOLIC BLOOD PRESSURE: 62 MMHG

## 2025-07-08 DIAGNOSIS — J84.9 ILD (INTERSTITIAL LUNG DISEASE) (HCC): Primary | ICD-10-CM

## 2025-07-08 DIAGNOSIS — R06.09 DYSPNEA ON EXERTION: ICD-10-CM

## 2025-07-08 DIAGNOSIS — J47.9 BRONCHIECTASIS WITHOUT COMPLICATION (HCC): ICD-10-CM

## 2025-07-08 PROCEDURE — 99213 OFFICE O/P EST LOW 20 MIN: CPT

## 2025-07-08 NOTE — ASSESSMENT & PLAN NOTE
- Improving with increased activity tolerance.  Likely secondary to deconditioning, has recently increased physical activity  -Unremarkable recent cardiac workup  -PFTs and 6 MWT normal  -Encouraged patient to continue regular exercise as tolerated to improve stamina and conditioning

## 2025-07-08 NOTE — ASSESSMENT & PLAN NOTE
- During ED visit in July 2024, found to have mild subpleural reticulations with mild bronchiectasis without honeycombing and a probable UIP pattern  -Unknown chronicity as no prior CT imaging of the chest  -Serologic workup for ILD unremarkable  -HRCT 11/2024 with stable findings  -Repeat PFTs in March 2025 normal with no restriction, normal lung volumes and diffusion capacity. Normal 6 MWT  -BORRERO improving, has intermittent dry cough    Plan:  - Given normal/stable PFTs, stable HRCT, and improving BORRERO, no need for antibiotics at this time  -Continue to monitor for any new or worsening symptoms  -Consider follow-up PFTs in March for 1 year follow-up to ensure stability

## 2025-07-08 NOTE — PROGRESS NOTES
Follow-up  Visit - Pulmonary Medicine   Name: Elza Kaplan      : 1945      MRN: [id number]  Encounter Provider: JENI Bonilla  Encounter Date: 2025   Encounter department: [facility]  :  Assessment & Plan  ILD (interstitial lung disease) (HCC)  - During ED visit in 2024, found to have mild subpleural reticulations with mild bronchiectasis without honeycombing and a probable UIP pattern  -Unknown chronicity as no prior CT imaging of the chest  -Serologic workup for ILD unremarkable  -HRCT 2024 with stable findings  -Repeat PFTs in 2025 normal with no restriction, normal lung volumes and diffusion capacity. Normal 6 MWT  -BORRERO improving, has intermittent dry cough    Plan:  - Given normal/stable PFTs, stable HRCT, and improving BORRERO, no need for antibiotics at this time  -Continue to monitor for any new or worsening symptoms  -Consider follow-up PFTs in March for 1 year follow-up to ensure stability       Dyspnea on exertion  - Improving with increased activity tolerance.  Likely secondary to deconditioning, has recently increased physical activity  -Unremarkable recent cardiac workup  -PFTs and 6 MWT normal  -Encouraged patient to continue regular exercise as tolerated to improve stamina and conditioning       Bronchiectasis without complication (HCC)  - Mild bronchiectasis on CT imaging.  Has dry cough only.  No issues with mucus production or clearance.  Will continue to monitor         No follow-ups on file.    History of Present Illness   Elza Kaplan is a 80 y.o. female with a PMH of ILD, HTN, HLD, CKD 3A, and prediabetes who is here today for a follow-up visit.  Previously seen in the office 3 months ago.  At that time, she was still experiencing dyspnea on exertion out of proportion to her PFTs.   She had stable HRCT and normal/stable PFTs.  She also had exertional chest heaviness. She was referred to cardiology.  Cardiac testing  was overall unremarkable.     Patient returns today, reports symptoms improving.  Has better activity tolerance.  She is increasing her physical activity.  Denies any wheezing, chest tightness, or lower extremity swelling.  Has occasional dry cough.      Review of Systems    Aside from what is mentioned in the HPI, ROS is otherwise negative         Medical History Reviewed by provider this encounter:     .    Objective   There were no vitals taken for this visit.    Physical Exam  Vitals and nursing note reviewed.   Constitutional:       General: She is not in acute distress.     Appearance: Normal appearance. She is well-developed.     Cardiovascular:      Rate and Rhythm: Normal rate and regular rhythm.      Heart sounds: Normal heart sounds, S1 normal and S2 normal. No murmur heard.  Pulmonary:      Effort: Pulmonary effort is normal.      Breath sounds: Examination of the right-lower field reveals rales. Examination of the left-lower field reveals rales. Rales present. No decreased breath sounds, wheezing or rhonchi.     Musculoskeletal:         General: No swelling.      Right lower leg: No edema.      Left lower leg: No edema.     Neurological:      Mental Status: She is alert.     Psychiatric:         Mood and Affect: Mood and affect normal.         Behavior: Behavior normal. Behavior is cooperative.           Diagnostic Data:  Labs: I personally reviewed the most recent laboratory data pertinent to today's visit.      Radiology results:  Radiology Results Review: I have reviewed radiology reports from 11/12/2024 including: CT chest.      PFT/spirometry results: Reviewed study from 3/10/2025      Oximetry testing:      Other studies:      JENI Bonilla

## 2025-07-13 DIAGNOSIS — E78.00 HYPERCHOLESTEROLEMIA: ICD-10-CM

## 2025-07-13 RX ORDER — SIMVASTATIN 40 MG
40 TABLET ORAL DAILY
Qty: 30 TABLET | Refills: 0 | Status: SHIPPED | OUTPATIENT
Start: 2025-07-13

## 2025-07-22 ENCOUNTER — APPOINTMENT (OUTPATIENT)
Dept: URBAN - NONMETROPOLITAN AREA CLINIC 4 | Facility: CLINIC | Age: 80
Setting detail: DERMATOLOGY
End: 2025-07-22

## 2025-07-22 DIAGNOSIS — Z87.2 PERSONAL HISTORY OF DISEASES OF THE SKIN AND SUBCUTANEOUS TISSUE: ICD-10-CM | Status: RESOLVED

## 2025-07-22 DIAGNOSIS — L82.0 INFLAMED SEBORRHEIC KERATOSIS: ICD-10-CM

## 2025-07-22 PROCEDURE — ? COUNSELING

## 2025-07-22 PROCEDURE — ? LIQUID NITROGEN

## 2025-07-22 PROCEDURE — ? TREATMENT REGIMEN

## 2025-07-22 PROCEDURE — ? FULL BODY SKIN EXAM - DECLINED

## 2025-07-22 ASSESSMENT — LOCATION SIMPLE DESCRIPTION DERM
LOCATION SIMPLE: LEFT UPPER BACK
LOCATION SIMPLE: RIGHT UPPER BACK
LOCATION SIMPLE: RIGHT LOWER BACK
LOCATION SIMPLE: RIGHT CHEEK
LOCATION SIMPLE: UPPER BACK
LOCATION SIMPLE: LEFT CHEEK
LOCATION SIMPLE: LEFT LOWER BACK

## 2025-07-22 ASSESSMENT — LOCATION DETAILED DESCRIPTION DERM
LOCATION DETAILED: SUPERIOR THORACIC SPINE
LOCATION DETAILED: LEFT MID-UPPER BACK
LOCATION DETAILED: RIGHT INFERIOR CENTRAL MALAR CHEEK
LOCATION DETAILED: RIGHT INFERIOR UPPER BACK
LOCATION DETAILED: RIGHT SUPERIOR UPPER BACK
LOCATION DETAILED: LEFT INFERIOR CENTRAL MALAR CHEEK
LOCATION DETAILED: LEFT INFERIOR UPPER BACK
LOCATION DETAILED: LEFT INFERIOR MEDIAL UPPER BACK
LOCATION DETAILED: RIGHT SUPERIOR LATERAL MIDBACK
LOCATION DETAILED: LEFT SUPERIOR MEDIAL MIDBACK
LOCATION DETAILED: INFERIOR THORACIC SPINE
LOCATION DETAILED: RIGHT MID-UPPER BACK
LOCATION DETAILED: LEFT SUPERIOR UPPER BACK

## 2025-07-22 ASSESSMENT — LOCATION ZONE DERM
LOCATION ZONE: FACE
LOCATION ZONE: TRUNK

## 2025-07-22 NOTE — PROCEDURE: TREATMENT REGIMEN
Initiate Treatment: Vaseline PRN until healed.
Detail Level: Zone
Continue Regimen: Daily sunscreen SPF 30+. [employer]

## 2025-07-22 NOTE — PROCEDURE: LIQUID NITROGEN
Medical Necessity Information: It is in your best interest to select a reason for this procedure from the list below. All of these items fulfill various CMS LCD requirements except the new and changing color options.
Render Note In Bullet Format When Appropriate: No
Render Post-Care Instructions In Note?: yes
Detail Level: Zone
Spray Paint Text: The liquid nitrogen was applied to the skin utilizing a spray paint frosting technique.
Post-Care Instructions: I reviewed with the patient in detail post-care instructions. Patient is to wear sunprotection, and avoid picking at any of the treated lesions. Pt may apply Vaseline to crusted or scabbing areas.
Consent: The patient's consent was obtained including but not limited to risks of crusting, scabbing, blistering, scarring, darker or lighter pigmentary change, recurrence, incomplete removal and infection.
Medical Necessity Clause: This procedure was medically necessary because the lesions that were treated were:
Number Of Freeze-Thaw Cycles: 1 freeze-thaw cycle
Application Tool (Optional): Cry-AC

## 2025-08-17 DIAGNOSIS — R00.0 TACHYCARDIA: ICD-10-CM

## 2025-08-19 RX ORDER — METOPROLOL SUCCINATE 25 MG/1
TABLET, EXTENDED RELEASE ORAL
Qty: 45 TABLET | Refills: 0 | OUTPATIENT
Start: 2025-08-19